# Patient Record
Sex: FEMALE | Race: WHITE | NOT HISPANIC OR LATINO | Employment: OTHER | URBAN - NONMETROPOLITAN AREA
[De-identification: names, ages, dates, MRNs, and addresses within clinical notes are randomized per-mention and may not be internally consistent; named-entity substitution may affect disease eponyms.]

---

## 2017-01-16 ENCOUNTER — HOSPITAL ENCOUNTER (OUTPATIENT)
Dept: LAB | Facility: MEDICAL CENTER | Age: 62
End: 2017-01-16
Attending: INTERNAL MEDICINE
Payer: COMMERCIAL

## 2017-01-16 LAB
ALBUMIN SERPL BCP-MCNC: 4.7 G/DL (ref 3.2–4.9)
ALBUMIN/GLOB SERPL: 2.2 G/DL
ALP SERPL-CCNC: 51 U/L (ref 30–99)
ALT SERPL-CCNC: 22 U/L (ref 2–50)
ANION GAP SERPL CALC-SCNC: 8 MMOL/L (ref 0–11.9)
AST SERPL-CCNC: 32 U/L (ref 12–45)
BASOPHILS # BLD AUTO: 0.01 K/UL (ref 0–0.12)
BASOPHILS NFR BLD AUTO: 0.3 % (ref 0–1.8)
BILIRUB SERPL-MCNC: 1 MG/DL (ref 0.1–1.5)
BUN SERPL-MCNC: 15 MG/DL (ref 8–22)
CALCIUM SERPL-MCNC: 9.5 MG/DL (ref 8.5–10.5)
CHLORIDE SERPL-SCNC: 106 MMOL/L (ref 96–112)
CO2 SERPL-SCNC: 25 MMOL/L (ref 20–33)
CREAT SERPL-MCNC: 0.74 MG/DL (ref 0.5–1.4)
EOSINOPHIL # BLD: 0.04 K/UL (ref 0–0.51)
EOSINOPHIL NFR BLD AUTO: 1.3 % (ref 0–6.9)
ERYTHROCYTE [DISTWIDTH] IN BLOOD BY AUTOMATED COUNT: 60.4 FL (ref 35.9–50)
GLOBULIN SER CALC-MCNC: 2.1 G/DL (ref 1.9–3.5)
GLUCOSE SERPL-MCNC: 98 MG/DL (ref 65–99)
HCT VFR BLD AUTO: 36.1 % (ref 37–47)
HGB BLD-MCNC: 12.1 G/DL (ref 12–16)
IMM GRANULOCYTES # BLD AUTO: 0.01 K/UL (ref 0–0.11)
IMM GRANULOCYTES NFR BLD AUTO: 0.3 % (ref 0–0.9)
LYMPHOCYTES # BLD: 0.38 K/UL (ref 1–4.8)
LYMPHOCYTES NFR BLD AUTO: 12.6 % (ref 22–41)
MAGNESIUM SERPL-MCNC: 2 MG/DL (ref 1.5–2.5)
MCH RBC QN AUTO: 37.2 PG (ref 27–33)
MCHC RBC AUTO-ENTMCNC: 33.5 G/DL (ref 33.6–35)
MCV RBC AUTO: 111.1 FL (ref 81.4–97.8)
MONOCYTES # BLD: 0.31 K/UL (ref 0–0.85)
MONOCYTES NFR BLD AUTO: 10.3 % (ref 0–13.4)
NEUTROPHILS # BLD: 2.27 K/UL (ref 2–7.15)
NEUTROPHILS NFR BLD AUTO: 75.2 % (ref 44–72)
NRBC # BLD AUTO: 0 K/UL
NRBC BLD-RTO: 0 /100 WBC
PHOSPHATE SERPL-MCNC: 2.3 MG/DL (ref 2.5–4.5)
PLATELET # BLD AUTO: 136 K/UL (ref 164–446)
PMV BLD AUTO: 10.1 FL (ref 9–12.9)
POTASSIUM SERPL-SCNC: 3.9 MMOL/L (ref 3.6–5.5)
PROT SERPL-MCNC: 6.8 G/DL (ref 6–8.2)
RBC # BLD AUTO: 3.25 M/UL (ref 4.2–5.4)
SODIUM SERPL-SCNC: 139 MMOL/L (ref 135–145)
WBC # BLD AUTO: 3 K/UL (ref 4.8–10.8)

## 2017-01-16 PROCEDURE — 85025 COMPLETE CBC W/AUTO DIFF WBC: CPT

## 2017-01-16 PROCEDURE — 36415 COLL VENOUS BLD VENIPUNCTURE: CPT

## 2017-01-16 PROCEDURE — 84100 ASSAY OF PHOSPHORUS: CPT

## 2017-01-16 PROCEDURE — 83735 ASSAY OF MAGNESIUM: CPT

## 2017-01-16 PROCEDURE — 86300 IMMUNOASSAY TUMOR CA 15-3: CPT

## 2017-01-16 PROCEDURE — 80053 COMPREHEN METABOLIC PANEL: CPT

## 2017-01-18 LAB — CANCER AG27-29 SERPL-ACNC: 75.1 U/ML (ref 0–40)

## 2017-01-31 ENCOUNTER — HOSPITAL ENCOUNTER (OUTPATIENT)
Dept: LAB | Facility: MEDICAL CENTER | Age: 62
End: 2017-01-31
Attending: INTERNAL MEDICINE
Payer: COMMERCIAL

## 2017-01-31 ENCOUNTER — APPOINTMENT (OUTPATIENT)
Dept: RADIOLOGY | Facility: IMAGING CENTER | Age: 62
End: 2017-01-31
Attending: SPECIALIST
Payer: COMMERCIAL

## 2017-01-31 ENCOUNTER — NON-PROVIDER VISIT (OUTPATIENT)
Dept: URGENT CARE | Facility: PHYSICIAN GROUP | Age: 62
End: 2017-01-31
Payer: COMMERCIAL

## 2017-01-31 ENCOUNTER — HOSPITAL ENCOUNTER (OUTPATIENT)
Dept: LAB | Facility: MEDICAL CENTER | Age: 62
End: 2017-01-31
Attending: SPECIALIST
Payer: COMMERCIAL

## 2017-01-31 DIAGNOSIS — C54.1 MALIGNANT NEOPLASM OF ENDOMETRIUM (HCC): ICD-10-CM

## 2017-01-31 LAB
ALBUMIN SERPL BCP-MCNC: 4.9 G/DL (ref 3.2–4.9)
ALBUMIN/GLOB SERPL: 2.2 G/DL
ALP SERPL-CCNC: 55 U/L (ref 30–99)
ALT SERPL-CCNC: 21 U/L (ref 2–50)
ANION GAP SERPL CALC-SCNC: 6 MMOL/L (ref 0–11.9)
AST SERPL-CCNC: 29 U/L (ref 12–45)
BASOPHILS # BLD AUTO: 0.02 K/UL (ref 0–0.12)
BASOPHILS # BLD AUTO: 0.02 K/UL (ref 0–0.12)
BASOPHILS NFR BLD AUTO: 1 % (ref 0–1.8)
BASOPHILS NFR BLD AUTO: 1.1 % (ref 0–1.8)
BILIRUB SERPL-MCNC: 0.8 MG/DL (ref 0.1–1.5)
BUN SERPL-MCNC: 18 MG/DL (ref 8–22)
CALCIUM SERPL-MCNC: 9.8 MG/DL (ref 8.5–10.5)
CANCER AG125 SERPL-ACNC: 8.2 U/ML (ref 0–35)
CHLORIDE SERPL-SCNC: 106 MMOL/L (ref 96–112)
CO2 SERPL-SCNC: 26 MMOL/L (ref 20–33)
CREAT SERPL-MCNC: 0.76 MG/DL (ref 0.5–1.4)
EOSINOPHIL # BLD: 0.05 K/UL (ref 0–0.51)
EOSINOPHIL # BLD: 0.06 K/UL (ref 0–0.51)
EOSINOPHIL NFR BLD AUTO: 2.7 % (ref 0–6.9)
EOSINOPHIL NFR BLD AUTO: 3.1 % (ref 0–6.9)
ERYTHROCYTE [DISTWIDTH] IN BLOOD BY AUTOMATED COUNT: 54.5 FL (ref 35.9–50)
ERYTHROCYTE [DISTWIDTH] IN BLOOD BY AUTOMATED COUNT: 55.7 FL (ref 35.9–50)
GLOBULIN SER CALC-MCNC: 2.2 G/DL (ref 1.9–3.5)
GLUCOSE SERPL-MCNC: 145 MG/DL (ref 65–99)
HCT VFR BLD AUTO: 37.4 % (ref 37–47)
HCT VFR BLD AUTO: 37.4 % (ref 37–47)
HGB BLD-MCNC: 12.6 G/DL (ref 12–16)
HGB BLD-MCNC: 12.8 G/DL (ref 12–16)
IMM GRANULOCYTES # BLD AUTO: 0.01 K/UL (ref 0–0.11)
IMM GRANULOCYTES # BLD AUTO: 0.01 K/UL (ref 0–0.11)
IMM GRANULOCYTES NFR BLD AUTO: 0.5 % (ref 0–0.9)
IMM GRANULOCYTES NFR BLD AUTO: 0.5 % (ref 0–0.9)
LYMPHOCYTES # BLD: 0.29 K/UL (ref 1–4.8)
LYMPHOCYTES # BLD: 0.35 K/UL (ref 1–4.8)
LYMPHOCYTES NFR BLD AUTO: 15.9 % (ref 22–41)
LYMPHOCYTES NFR BLD AUTO: 18.3 % (ref 22–41)
MCH RBC QN AUTO: 36.8 PG (ref 27–33)
MCH RBC QN AUTO: 37.6 PG (ref 27–33)
MCHC RBC AUTO-ENTMCNC: 33.7 G/DL (ref 33.6–35)
MCHC RBC AUTO-ENTMCNC: 34.2 G/DL (ref 33.6–35)
MCV RBC AUTO: 109.4 FL (ref 81.4–97.8)
MCV RBC AUTO: 110 FL (ref 81.4–97.8)
MONOCYTES # BLD: 0.16 K/UL (ref 0–0.85)
MONOCYTES # BLD: 0.17 K/UL (ref 0–0.85)
MONOCYTES NFR BLD AUTO: 8.8 % (ref 0–13.4)
MONOCYTES NFR BLD AUTO: 8.9 % (ref 0–13.4)
NEUTROPHILS # BLD: 1.29 K/UL (ref 2–7.15)
NEUTROPHILS # BLD: 1.3 K/UL (ref 2–7.15)
NEUTROPHILS NFR BLD AUTO: 68.2 % (ref 44–72)
NEUTROPHILS NFR BLD AUTO: 71 % (ref 44–72)
NRBC # BLD AUTO: 0 K/UL
NRBC # BLD AUTO: 0.02 K/UL
NRBC BLD-RTO: 0 /100 WBC
NRBC BLD-RTO: 1.1 /100 WBC
PLATELET # BLD AUTO: 218 K/UL (ref 164–446)
PLATELET # BLD AUTO: 218 K/UL (ref 164–446)
PMV BLD AUTO: 9.5 FL (ref 9–12.9)
PMV BLD AUTO: 9.6 FL (ref 9–12.9)
POTASSIUM SERPL-SCNC: 3.9 MMOL/L (ref 3.6–5.5)
PROT SERPL-MCNC: 7.1 G/DL (ref 6–8.2)
RBC # BLD AUTO: 3.4 M/UL (ref 4.2–5.4)
RBC # BLD AUTO: 3.42 M/UL (ref 4.2–5.4)
SODIUM SERPL-SCNC: 138 MMOL/L (ref 135–145)
WBC # BLD AUTO: 1.8 K/UL (ref 4.8–10.8)
WBC # BLD AUTO: 1.9 K/UL (ref 4.8–10.8)

## 2017-01-31 PROCEDURE — 86304 IMMUNOASSAY TUMOR CA 125: CPT

## 2017-01-31 PROCEDURE — 71020 DX-CHEST-2 VIEWS: CPT | Mod: 26 | Performed by: NURSE PRACTITIONER

## 2017-01-31 PROCEDURE — 85025 COMPLETE CBC W/AUTO DIFF WBC: CPT | Mod: 91

## 2017-01-31 PROCEDURE — 80053 COMPREHEN METABOLIC PANEL: CPT

## 2017-01-31 PROCEDURE — 36415 COLL VENOUS BLD VENIPUNCTURE: CPT

## 2017-01-31 PROCEDURE — 85025 COMPLETE CBC W/AUTO DIFF WBC: CPT

## 2017-02-07 ENCOUNTER — HOSPITAL ENCOUNTER (OUTPATIENT)
Dept: LAB | Facility: MEDICAL CENTER | Age: 62
End: 2017-02-07
Attending: INTERNAL MEDICINE
Payer: COMMERCIAL

## 2017-02-07 LAB
BASOPHILS # BLD AUTO: 0.01 K/UL (ref 0–0.12)
BASOPHILS NFR BLD AUTO: 0.5 % (ref 0–1.8)
EOSINOPHIL # BLD: 0.07 K/UL (ref 0–0.51)
EOSINOPHIL NFR BLD AUTO: 3.4 % (ref 0–6.9)
ERYTHROCYTE [DISTWIDTH] IN BLOOD BY AUTOMATED COUNT: 60 FL (ref 35.9–50)
HCT VFR BLD AUTO: 35.8 % (ref 37–47)
HGB BLD-MCNC: 12.3 G/DL (ref 12–16)
IMM GRANULOCYTES # BLD AUTO: 0 K/UL (ref 0–0.11)
IMM GRANULOCYTES NFR BLD AUTO: 0 % (ref 0–0.9)
LYMPHOCYTES # BLD: 0.4 K/UL (ref 1–4.8)
LYMPHOCYTES NFR BLD AUTO: 19.7 % (ref 22–41)
MCH RBC QN AUTO: 37.5 PG (ref 27–33)
MCHC RBC AUTO-ENTMCNC: 34.4 G/DL (ref 33.6–35)
MCV RBC AUTO: 109.1 FL (ref 81.4–97.8)
MONOCYTES # BLD: 0.27 K/UL (ref 0–0.85)
MONOCYTES NFR BLD AUTO: 13.3 % (ref 0–13.4)
NEUTROPHILS # BLD: 1.28 K/UL (ref 2–7.15)
NEUTROPHILS NFR BLD AUTO: 63.1 % (ref 44–72)
NRBC # BLD AUTO: 0 K/UL
NRBC BLD-RTO: 0 /100 WBC
PLATELET # BLD AUTO: 169 K/UL (ref 164–446)
PMV BLD AUTO: 9.4 FL (ref 9–12.9)
RBC # BLD AUTO: 3.28 M/UL (ref 4.2–5.4)
WBC # BLD AUTO: 2 K/UL (ref 4.8–10.8)

## 2017-02-07 PROCEDURE — 85025 COMPLETE CBC W/AUTO DIFF WBC: CPT

## 2017-02-07 PROCEDURE — 36415 COLL VENOUS BLD VENIPUNCTURE: CPT

## 2017-02-14 ENCOUNTER — HOSPITAL ENCOUNTER (OUTPATIENT)
Dept: LAB | Facility: MEDICAL CENTER | Age: 62
End: 2017-02-14
Attending: INTERNAL MEDICINE
Payer: COMMERCIAL

## 2017-02-14 LAB
ALBUMIN SERPL BCP-MCNC: 4.9 G/DL (ref 3.2–4.9)
ALBUMIN/GLOB SERPL: 2 G/DL
ALP SERPL-CCNC: 57 U/L (ref 30–99)
ALT SERPL-CCNC: 24 U/L (ref 2–50)
ANION GAP SERPL CALC-SCNC: 9 MMOL/L (ref 0–11.9)
AST SERPL-CCNC: 31 U/L (ref 12–45)
BASOPHILS # BLD AUTO: 0.01 K/UL (ref 0–0.12)
BASOPHILS NFR BLD AUTO: 0.5 % (ref 0–1.8)
BILIRUB SERPL-MCNC: 1.4 MG/DL (ref 0.1–1.5)
BUN SERPL-MCNC: 19 MG/DL (ref 8–22)
CALCIUM SERPL-MCNC: 10.5 MG/DL (ref 8.5–10.5)
CHLORIDE SERPL-SCNC: 103 MMOL/L (ref 96–112)
CO2 SERPL-SCNC: 27 MMOL/L (ref 20–33)
CREAT SERPL-MCNC: 0.78 MG/DL (ref 0.5–1.4)
EOSINOPHIL # BLD: 0.06 K/UL (ref 0–0.51)
EOSINOPHIL NFR BLD AUTO: 3 % (ref 0–6.9)
ERYTHROCYTE [DISTWIDTH] IN BLOOD BY AUTOMATED COUNT: 54.5 FL (ref 35.9–50)
GLOBULIN SER CALC-MCNC: 2.5 G/DL (ref 1.9–3.5)
GLUCOSE SERPL-MCNC: 82 MG/DL (ref 65–99)
HCT VFR BLD AUTO: 38.5 % (ref 37–47)
HGB BLD-MCNC: 13.1 G/DL (ref 12–16)
IMM GRANULOCYTES # BLD AUTO: 0.01 K/UL (ref 0–0.11)
IMM GRANULOCYTES NFR BLD AUTO: 0.5 % (ref 0–0.9)
LYMPHOCYTES # BLD: 0.34 K/UL (ref 1–4.8)
LYMPHOCYTES NFR BLD AUTO: 17 % (ref 22–41)
MAGNESIUM SERPL-MCNC: 1.9 MG/DL (ref 1.5–2.5)
MCH RBC QN AUTO: 36.4 PG (ref 27–33)
MCHC RBC AUTO-ENTMCNC: 34 G/DL (ref 33.6–35)
MCV RBC AUTO: 106.9 FL (ref 81.4–97.8)
MONOCYTES # BLD: 0.24 K/UL (ref 0–0.85)
MONOCYTES NFR BLD AUTO: 12 % (ref 0–13.4)
NEUTROPHILS # BLD: 1.34 K/UL (ref 2–7.15)
NEUTROPHILS NFR BLD AUTO: 67 % (ref 44–72)
NRBC # BLD AUTO: 0 K/UL
NRBC BLD-RTO: 0 /100 WBC
PHOSPHATE SERPL-MCNC: 2.8 MG/DL (ref 2.5–4.5)
PLATELET # BLD AUTO: 123 K/UL (ref 164–446)
PMV BLD AUTO: 9.9 FL (ref 9–12.9)
POTASSIUM SERPL-SCNC: 3.8 MMOL/L (ref 3.6–5.5)
PROT SERPL-MCNC: 7.4 G/DL (ref 6–8.2)
RBC # BLD AUTO: 3.6 M/UL (ref 4.2–5.4)
SODIUM SERPL-SCNC: 139 MMOL/L (ref 135–145)
WBC # BLD AUTO: 2 K/UL (ref 4.8–10.8)

## 2017-02-14 PROCEDURE — 36415 COLL VENOUS BLD VENIPUNCTURE: CPT

## 2017-02-14 PROCEDURE — 80053 COMPREHEN METABOLIC PANEL: CPT

## 2017-02-14 PROCEDURE — 85025 COMPLETE CBC W/AUTO DIFF WBC: CPT

## 2017-02-14 PROCEDURE — 84100 ASSAY OF PHOSPHORUS: CPT

## 2017-02-14 PROCEDURE — 86300 IMMUNOASSAY TUMOR CA 15-3: CPT

## 2017-02-14 PROCEDURE — 83735 ASSAY OF MAGNESIUM: CPT

## 2017-02-16 LAB — CANCER AG27-29 SERPL-ACNC: 73.3 U/ML (ref 0–40)

## 2017-02-21 ENCOUNTER — HOSPITAL ENCOUNTER (OUTPATIENT)
Dept: LAB | Facility: MEDICAL CENTER | Age: 62
End: 2017-02-21
Attending: INTERNAL MEDICINE
Payer: COMMERCIAL

## 2017-02-21 LAB
BASOPHILS # BLD AUTO: 0.01 K/UL (ref 0–0.12)
BASOPHILS NFR BLD AUTO: 0.5 % (ref 0–1.8)
EOSINOPHIL # BLD: 0.08 K/UL (ref 0–0.51)
EOSINOPHIL NFR BLD AUTO: 4 % (ref 0–6.9)
ERYTHROCYTE [DISTWIDTH] IN BLOOD BY AUTOMATED COUNT: 55.6 FL (ref 35.9–50)
HCT VFR BLD AUTO: 34.6 % (ref 37–47)
HGB BLD-MCNC: 11.9 G/DL (ref 12–16)
IMM GRANULOCYTES # BLD AUTO: 0 K/UL (ref 0–0.11)
IMM GRANULOCYTES NFR BLD AUTO: 0 % (ref 0–0.9)
LYMPHOCYTES # BLD: 0.4 K/UL (ref 1–4.8)
LYMPHOCYTES NFR BLD AUTO: 20 % (ref 22–41)
MCH RBC QN AUTO: 37.2 PG (ref 27–33)
MCHC RBC AUTO-ENTMCNC: 34.4 G/DL (ref 33.6–35)
MCV RBC AUTO: 108.1 FL (ref 81.4–97.8)
MONOCYTES # BLD: 0.3 K/UL (ref 0–0.85)
MONOCYTES NFR BLD AUTO: 15 % (ref 0–13.4)
NEUTROPHILS # BLD: 1.21 K/UL (ref 2–7.15)
NEUTROPHILS NFR BLD AUTO: 60.5 % (ref 44–72)
NRBC # BLD AUTO: 0 K/UL
NRBC BLD-RTO: 0 /100 WBC
PLATELET # BLD AUTO: 157 K/UL (ref 164–446)
PMV BLD AUTO: 9.4 FL (ref 9–12.9)
RBC # BLD AUTO: 3.2 M/UL (ref 4.2–5.4)
WBC # BLD AUTO: 2 K/UL (ref 4.8–10.8)

## 2017-02-21 PROCEDURE — 36415 COLL VENOUS BLD VENIPUNCTURE: CPT

## 2017-02-21 PROCEDURE — 85025 COMPLETE CBC W/AUTO DIFF WBC: CPT

## 2017-02-28 ENCOUNTER — HOSPITAL ENCOUNTER (OUTPATIENT)
Dept: LAB | Facility: MEDICAL CENTER | Age: 62
End: 2017-02-28
Attending: INTERNAL MEDICINE
Payer: COMMERCIAL

## 2017-02-28 LAB
BASOPHILS # BLD AUTO: 0.02 K/UL (ref 0–0.12)
BASOPHILS NFR BLD AUTO: 1.1 % (ref 0–1.8)
EOSINOPHIL # BLD: 0.07 K/UL (ref 0–0.51)
EOSINOPHIL NFR BLD AUTO: 3.7 % (ref 0–6.9)
ERYTHROCYTE [DISTWIDTH] IN BLOOD BY AUTOMATED COUNT: 59.7 FL (ref 35.9–50)
HCT VFR BLD AUTO: 36.4 % (ref 37–47)
HGB BLD-MCNC: 12.4 G/DL (ref 12–16)
IMM GRANULOCYTES # BLD AUTO: 0 K/UL (ref 0–0.11)
IMM GRANULOCYTES NFR BLD AUTO: 0 % (ref 0–0.9)
LYMPHOCYTES # BLD: 0.36 K/UL (ref 1–4.8)
LYMPHOCYTES NFR BLD AUTO: 19.1 % (ref 22–41)
MCH RBC QN AUTO: 36.9 PG (ref 27–33)
MCHC RBC AUTO-ENTMCNC: 34.1 G/DL (ref 33.6–35)
MCV RBC AUTO: 108.3 FL (ref 81.4–97.8)
MONOCYTES # BLD: 0.28 K/UL (ref 0–0.85)
MONOCYTES NFR BLD AUTO: 14.9 % (ref 0–13.4)
NEUTROPHILS # BLD: 1.15 K/UL (ref 2–7.15)
NEUTROPHILS NFR BLD AUTO: 61.2 % (ref 44–72)
NRBC # BLD AUTO: 0 K/UL
NRBC BLD-RTO: 0 /100 WBC
PLATELET # BLD AUTO: 177 K/UL (ref 164–446)
PMV BLD AUTO: 9.4 FL (ref 9–12.9)
RBC # BLD AUTO: 3.36 M/UL (ref 4.2–5.4)
WBC # BLD AUTO: 1.9 K/UL (ref 4.8–10.8)

## 2017-02-28 PROCEDURE — 36415 COLL VENOUS BLD VENIPUNCTURE: CPT

## 2017-02-28 PROCEDURE — 85025 COMPLETE CBC W/AUTO DIFF WBC: CPT

## 2017-03-02 ENCOUNTER — HOSPITAL ENCOUNTER (OUTPATIENT)
Facility: MEDICAL CENTER | Age: 62
End: 2017-03-02
Attending: SPECIALIST
Payer: COMMERCIAL

## 2017-03-02 PROCEDURE — 88175 CYTOPATH C/V AUTO FLUID REDO: CPT

## 2017-03-03 LAB — CYTOLOGY REG CYTOL: NORMAL

## 2017-03-07 ENCOUNTER — HOSPITAL ENCOUNTER (OUTPATIENT)
Dept: LAB | Facility: MEDICAL CENTER | Age: 62
End: 2017-03-07
Attending: INTERNAL MEDICINE
Payer: COMMERCIAL

## 2017-03-07 LAB
BASOPHILS # BLD AUTO: 0.02 K/UL (ref 0–0.12)
BASOPHILS NFR BLD AUTO: 0.7 % (ref 0–1.8)
EOSINOPHIL # BLD: 0.05 K/UL (ref 0–0.51)
EOSINOPHIL NFR BLD AUTO: 1.9 % (ref 0–6.9)
ERYTHROCYTE [DISTWIDTH] IN BLOOD BY AUTOMATED COUNT: 56.4 FL (ref 35.9–50)
HCT VFR BLD AUTO: 33.3 % (ref 37–47)
HGB BLD-MCNC: 11.5 G/DL (ref 12–16)
IMM GRANULOCYTES # BLD AUTO: 0 K/UL (ref 0–0.11)
IMM GRANULOCYTES NFR BLD AUTO: 0 % (ref 0–0.9)
LYMPHOCYTES # BLD: 0.45 K/UL (ref 1–4.8)
LYMPHOCYTES NFR BLD AUTO: 16.7 % (ref 22–41)
MCH RBC QN AUTO: 37.3 PG (ref 27–33)
MCHC RBC AUTO-ENTMCNC: 34.5 G/DL (ref 33.6–35)
MCV RBC AUTO: 108.1 FL (ref 81.4–97.8)
MONOCYTES # BLD: 0.3 K/UL (ref 0–0.85)
MONOCYTES NFR BLD AUTO: 11.1 % (ref 0–13.4)
NEUTROPHILS # BLD: 1.88 K/UL (ref 2–7.15)
NEUTROPHILS NFR BLD AUTO: 69.6 % (ref 44–72)
NRBC # BLD AUTO: 0 K/UL
NRBC BLD-RTO: 0 /100 WBC
PLATELET # BLD AUTO: 137 K/UL (ref 164–446)
PMV BLD AUTO: 9.9 FL (ref 9–12.9)
RBC # BLD AUTO: 3.08 M/UL (ref 4.2–5.4)
WBC # BLD AUTO: 2.7 K/UL (ref 4.8–10.8)

## 2017-03-07 PROCEDURE — 85025 COMPLETE CBC W/AUTO DIFF WBC: CPT

## 2017-03-07 PROCEDURE — 36415 COLL VENOUS BLD VENIPUNCTURE: CPT

## 2017-03-14 ENCOUNTER — HOSPITAL ENCOUNTER (OUTPATIENT)
Dept: LAB | Facility: MEDICAL CENTER | Age: 62
End: 2017-03-14
Attending: INTERNAL MEDICINE
Payer: COMMERCIAL

## 2017-03-14 LAB
ALBUMIN SERPL BCP-MCNC: 4.7 G/DL (ref 3.2–4.9)
ALBUMIN/GLOB SERPL: 2.2 G/DL
ALP SERPL-CCNC: 53 U/L (ref 30–99)
ALT SERPL-CCNC: 21 U/L (ref 2–50)
ANION GAP SERPL CALC-SCNC: 8 MMOL/L (ref 0–11.9)
AST SERPL-CCNC: 28 U/L (ref 12–45)
BASOPHILS # BLD AUTO: 0.02 K/UL (ref 0–0.12)
BASOPHILS NFR BLD AUTO: 1.1 % (ref 0–1.8)
BILIRUB SERPL-MCNC: 1.4 MG/DL (ref 0.1–1.5)
BUN SERPL-MCNC: 15 MG/DL (ref 8–22)
CALCIUM SERPL-MCNC: 10.1 MG/DL (ref 8.5–10.5)
CHLORIDE SERPL-SCNC: 102 MMOL/L (ref 96–112)
CO2 SERPL-SCNC: 28 MMOL/L (ref 20–33)
CREAT SERPL-MCNC: 0.84 MG/DL (ref 0.5–1.4)
EOSINOPHIL # BLD: 0.05 K/UL (ref 0–0.51)
EOSINOPHIL NFR BLD AUTO: 2.8 % (ref 0–6.9)
ERYTHROCYTE [DISTWIDTH] IN BLOOD BY AUTOMATED COUNT: 59.3 FL (ref 35.9–50)
GLOBULIN SER CALC-MCNC: 2.1 G/DL (ref 1.9–3.5)
GLUCOSE SERPL-MCNC: 93 MG/DL (ref 65–99)
HCT VFR BLD AUTO: 34.9 % (ref 37–47)
HGB BLD-MCNC: 12 G/DL (ref 12–16)
IMM GRANULOCYTES # BLD AUTO: 0.01 K/UL (ref 0–0.11)
IMM GRANULOCYTES NFR BLD AUTO: 0.6 % (ref 0–0.9)
LYMPHOCYTES # BLD: 0.44 K/UL (ref 1–4.8)
LYMPHOCYTES NFR BLD AUTO: 24.4 % (ref 22–41)
MAGNESIUM SERPL-MCNC: 1.8 MG/DL (ref 1.5–2.5)
MCH RBC QN AUTO: 37 PG (ref 27–33)
MCHC RBC AUTO-ENTMCNC: 34.4 G/DL (ref 33.6–35)
MCV RBC AUTO: 107.7 FL (ref 81.4–97.8)
MONOCYTES # BLD: 0.31 K/UL (ref 0–0.85)
MONOCYTES NFR BLD AUTO: 17.2 % (ref 0–13.4)
NEUTROPHILS # BLD: 0.97 K/UL (ref 2–7.15)
NEUTROPHILS NFR BLD AUTO: 53.9 % (ref 44–72)
NRBC # BLD AUTO: 0.02 K/UL
NRBC BLD-RTO: 1.1 /100 WBC
PHOSPHATE SERPL-MCNC: 3.9 MG/DL (ref 2.5–4.5)
PLATELET # BLD AUTO: 157 K/UL (ref 164–446)
PMV BLD AUTO: 9.5 FL (ref 9–12.9)
POTASSIUM SERPL-SCNC: 4 MMOL/L (ref 3.6–5.5)
PROT SERPL-MCNC: 6.8 G/DL (ref 6–8.2)
RBC # BLD AUTO: 3.24 M/UL (ref 4.2–5.4)
SODIUM SERPL-SCNC: 138 MMOL/L (ref 135–145)
WBC # BLD AUTO: 1.8 K/UL (ref 4.8–10.8)

## 2017-03-14 PROCEDURE — 36415 COLL VENOUS BLD VENIPUNCTURE: CPT

## 2017-03-14 PROCEDURE — 86300 IMMUNOASSAY TUMOR CA 15-3: CPT

## 2017-03-14 PROCEDURE — 83735 ASSAY OF MAGNESIUM: CPT

## 2017-03-14 PROCEDURE — 80053 COMPREHEN METABOLIC PANEL: CPT

## 2017-03-14 PROCEDURE — 85025 COMPLETE CBC W/AUTO DIFF WBC: CPT

## 2017-03-14 PROCEDURE — 84100 ASSAY OF PHOSPHORUS: CPT

## 2017-03-16 LAB — CANCER AG27-29 SERPL-ACNC: 73.9 U/ML (ref 0–40)

## 2017-03-21 ENCOUNTER — HOSPITAL ENCOUNTER (OUTPATIENT)
Dept: LAB | Facility: MEDICAL CENTER | Age: 62
End: 2017-03-21
Attending: INTERNAL MEDICINE
Payer: COMMERCIAL

## 2017-03-21 LAB
BASOPHILS # BLD AUTO: 0.02 K/UL (ref 0–0.12)
BASOPHILS NFR BLD AUTO: 1 % (ref 0–1.8)
EOSINOPHIL # BLD: 0.07 K/UL (ref 0–0.51)
EOSINOPHIL NFR BLD AUTO: 3.6 % (ref 0–6.9)
ERYTHROCYTE [DISTWIDTH] IN BLOOD BY AUTOMATED COUNT: 63.4 FL (ref 35.9–50)
HCT VFR BLD AUTO: 32.9 % (ref 37–47)
HGB BLD-MCNC: 11.6 G/DL (ref 12–16)
IMM GRANULOCYTES # BLD AUTO: 0 K/UL (ref 0–0.11)
IMM GRANULOCYTES NFR BLD AUTO: 0 % (ref 0–0.9)
LYMPHOCYTES # BLD: 0.46 K/UL (ref 1–4.8)
LYMPHOCYTES NFR BLD AUTO: 23.7 % (ref 22–41)
MCH RBC QN AUTO: 38.2 PG (ref 27–33)
MCHC RBC AUTO-ENTMCNC: 35.3 G/DL (ref 33.6–35)
MCV RBC AUTO: 108.2 FL (ref 81.4–97.8)
MONOCYTES # BLD: 0.33 K/UL (ref 0–0.85)
MONOCYTES NFR BLD AUTO: 17 % (ref 0–13.4)
NEUTROPHILS # BLD: 1.06 K/UL (ref 2–7.15)
NEUTROPHILS NFR BLD AUTO: 54.7 % (ref 44–72)
NRBC # BLD AUTO: 0 K/UL
NRBC BLD-RTO: 0 /100 WBC
PLATELET # BLD AUTO: 193 K/UL (ref 164–446)
PMV BLD AUTO: 9.4 FL (ref 9–12.9)
RBC # BLD AUTO: 3.04 M/UL (ref 4.2–5.4)
WBC # BLD AUTO: 1.9 K/UL (ref 4.8–10.8)

## 2017-03-21 PROCEDURE — 85025 COMPLETE CBC W/AUTO DIFF WBC: CPT

## 2017-03-21 PROCEDURE — 36415 COLL VENOUS BLD VENIPUNCTURE: CPT

## 2017-03-28 ENCOUNTER — HOSPITAL ENCOUNTER (OUTPATIENT)
Dept: LAB | Facility: MEDICAL CENTER | Age: 62
End: 2017-03-28
Attending: INTERNAL MEDICINE
Payer: COMMERCIAL

## 2017-03-28 LAB
BASOPHILS # BLD AUTO: 0.03 K/UL (ref 0–0.12)
BASOPHILS NFR BLD AUTO: 1.5 % (ref 0–1.8)
EOSINOPHIL # BLD: 0.05 K/UL (ref 0–0.51)
EOSINOPHIL NFR BLD AUTO: 2.4 % (ref 0–6.9)
ERYTHROCYTE [DISTWIDTH] IN BLOOD BY AUTOMATED COUNT: 60 FL (ref 35.9–50)
HCT VFR BLD AUTO: 36.1 % (ref 37–47)
HGB BLD-MCNC: 12.6 G/DL (ref 12–16)
IMM GRANULOCYTES # BLD AUTO: 0.01 K/UL (ref 0–0.11)
IMM GRANULOCYTES NFR BLD AUTO: 0.5 % (ref 0–0.9)
LYMPHOCYTES # BLD: 0.39 K/UL (ref 1–4.8)
LYMPHOCYTES NFR BLD AUTO: 18.9 % (ref 22–41)
MCH RBC QN AUTO: 37.8 PG (ref 27–33)
MCHC RBC AUTO-ENTMCNC: 34.9 G/DL (ref 33.6–35)
MCV RBC AUTO: 108.4 FL (ref 81.4–97.8)
MONOCYTES # BLD: 0.23 K/UL (ref 0–0.85)
MONOCYTES NFR BLD AUTO: 11.2 % (ref 0–13.4)
NEUTROPHILS # BLD: 1.35 K/UL (ref 2–7.15)
NEUTROPHILS NFR BLD AUTO: 65.5 % (ref 44–72)
NRBC # BLD AUTO: 0.02 K/UL
NRBC BLD-RTO: 1 /100 WBC
PLATELET # BLD AUTO: 132 K/UL (ref 164–446)
PMV BLD AUTO: 9.5 FL (ref 9–12.9)
RBC # BLD AUTO: 3.33 M/UL (ref 4.2–5.4)
WBC # BLD AUTO: 2.1 K/UL (ref 4.8–10.8)

## 2017-03-28 PROCEDURE — 36415 COLL VENOUS BLD VENIPUNCTURE: CPT

## 2017-03-28 PROCEDURE — 85025 COMPLETE CBC W/AUTO DIFF WBC: CPT

## 2017-04-04 ENCOUNTER — HOSPITAL ENCOUNTER (OUTPATIENT)
Dept: LAB | Facility: MEDICAL CENTER | Age: 62
End: 2017-04-04
Attending: INTERNAL MEDICINE
Payer: COMMERCIAL

## 2017-04-04 LAB
BASOPHILS # BLD AUTO: 0.5 % (ref 0–1.8)
BASOPHILS # BLD: 0.01 K/UL (ref 0–0.12)
EOSINOPHIL # BLD AUTO: 0.07 K/UL (ref 0–0.51)
EOSINOPHIL NFR BLD: 3.6 % (ref 0–6.9)
ERYTHROCYTE [DISTWIDTH] IN BLOOD BY AUTOMATED COUNT: 61 FL (ref 35.9–50)
HCT VFR BLD AUTO: 33.5 % (ref 37–47)
HGB BLD-MCNC: 12.1 G/DL (ref 12–16)
IMM GRANULOCYTES # BLD AUTO: 0 K/UL (ref 0–0.11)
IMM GRANULOCYTES NFR BLD AUTO: 0 % (ref 0–0.9)
LYMPHOCYTES # BLD AUTO: 0.49 K/UL (ref 1–4.8)
LYMPHOCYTES NFR BLD: 24.9 % (ref 22–41)
MCH RBC QN AUTO: 39.5 PG (ref 27–33)
MCHC RBC AUTO-ENTMCNC: 36.1 G/DL (ref 33.6–35)
MCV RBC AUTO: 109.5 FL (ref 81.4–97.8)
MONOCYTES # BLD AUTO: 0.26 K/UL (ref 0–0.85)
MONOCYTES NFR BLD AUTO: 13.2 % (ref 0–13.4)
NEUTROPHILS # BLD AUTO: 1.14 K/UL (ref 2–7.15)
NEUTROPHILS NFR BLD: 57.8 % (ref 44–72)
NRBC # BLD AUTO: 0 K/UL
NRBC BLD AUTO-RTO: 0 /100 WBC
PLATELET # BLD AUTO: 135 K/UL (ref 164–446)
PMV BLD AUTO: 9.8 FL (ref 9–12.9)
RBC # BLD AUTO: 3.06 M/UL (ref 4.2–5.4)
WBC # BLD AUTO: 2 K/UL (ref 4.8–10.8)

## 2017-04-04 PROCEDURE — 85025 COMPLETE CBC W/AUTO DIFF WBC: CPT

## 2017-04-04 PROCEDURE — 36415 COLL VENOUS BLD VENIPUNCTURE: CPT

## 2017-04-18 ENCOUNTER — HOSPITAL ENCOUNTER (OUTPATIENT)
Dept: LAB | Facility: MEDICAL CENTER | Age: 62
End: 2017-04-18
Attending: INTERNAL MEDICINE
Payer: COMMERCIAL

## 2017-04-18 PROCEDURE — 86300 IMMUNOASSAY TUMOR CA 15-3: CPT

## 2017-04-18 PROCEDURE — 36415 COLL VENOUS BLD VENIPUNCTURE: CPT

## 2017-04-18 PROCEDURE — 85025 COMPLETE CBC W/AUTO DIFF WBC: CPT

## 2017-04-19 LAB
BASOPHILS # BLD AUTO: 1.2 % (ref 0–1.8)
BASOPHILS # BLD: 0.03 K/UL (ref 0–0.12)
EOSINOPHIL # BLD AUTO: 0.05 K/UL (ref 0–0.51)
EOSINOPHIL NFR BLD: 2.1 % (ref 0–6.9)
ERYTHROCYTE [DISTWIDTH] IN BLOOD BY AUTOMATED COUNT: 59.6 FL (ref 35.9–50)
HCT VFR BLD AUTO: 36.1 % (ref 37–47)
HGB BLD-MCNC: 12.7 G/DL (ref 12–16)
IMM GRANULOCYTES # BLD AUTO: 0 K/UL (ref 0–0.11)
IMM GRANULOCYTES NFR BLD AUTO: 0 % (ref 0–0.9)
LYMPHOCYTES # BLD AUTO: 0.38 K/UL (ref 1–4.8)
LYMPHOCYTES NFR BLD: 15.7 % (ref 22–41)
MCH RBC QN AUTO: 38.3 PG (ref 27–33)
MCHC RBC AUTO-ENTMCNC: 35.2 G/DL (ref 33.6–35)
MCV RBC AUTO: 108.7 FL (ref 81.4–97.8)
MONOCYTES # BLD AUTO: 0.28 K/UL (ref 0–0.85)
MONOCYTES NFR BLD AUTO: 11.6 % (ref 0–13.4)
NEUTROPHILS # BLD AUTO: 1.68 K/UL (ref 2–7.15)
NEUTROPHILS NFR BLD: 69.4 % (ref 44–72)
NRBC # BLD AUTO: 0 K/UL
NRBC BLD AUTO-RTO: 0 /100 WBC
PLATELET # BLD AUTO: 164 K/UL (ref 164–446)
PMV BLD AUTO: 10.1 FL (ref 9–12.9)
RBC # BLD AUTO: 3.32 M/UL (ref 4.2–5.4)
WBC # BLD AUTO: 2.4 K/UL (ref 4.8–10.8)

## 2017-04-20 LAB — CANCER AG27-29 SERPL-ACNC: 76.1 U/ML (ref 0–40)

## 2017-04-24 ENCOUNTER — HOSPITAL ENCOUNTER (OUTPATIENT)
Facility: MEDICAL CENTER | Age: 62
End: 2017-04-24
Attending: INTERNAL MEDICINE
Payer: COMMERCIAL

## 2017-04-24 LAB
ALBUMIN SERPL BCP-MCNC: 4.7 G/DL (ref 3.2–4.9)
ALBUMIN/GLOB SERPL: 2.8 G/DL
ALP SERPL-CCNC: 48 U/L (ref 30–99)
ALT SERPL-CCNC: 18 U/L (ref 2–50)
ANION GAP SERPL CALC-SCNC: 5 MMOL/L (ref 0–11.9)
AST SERPL-CCNC: 26 U/L (ref 12–45)
BILIRUB SERPL-MCNC: 0.9 MG/DL (ref 0.1–1.5)
BUN SERPL-MCNC: 16 MG/DL (ref 8–22)
CALCIUM SERPL-MCNC: 9.9 MG/DL (ref 8.5–10.5)
CHLORIDE SERPL-SCNC: 106 MMOL/L (ref 96–112)
CO2 SERPL-SCNC: 28 MMOL/L (ref 20–33)
CREAT SERPL-MCNC: 0.78 MG/DL (ref 0.5–1.4)
GFR SERPL CREATININE-BSD FRML MDRD: >60 ML/MIN/1.73 M 2
GLOBULIN SER CALC-MCNC: 1.7 G/DL (ref 1.9–3.5)
GLUCOSE SERPL-MCNC: 111 MG/DL (ref 65–99)
POTASSIUM SERPL-SCNC: 4 MMOL/L (ref 3.6–5.5)
PROT SERPL-MCNC: 6.4 G/DL (ref 6–8.2)
SODIUM SERPL-SCNC: 139 MMOL/L (ref 135–145)

## 2017-04-24 PROCEDURE — 80053 COMPREHEN METABOLIC PANEL: CPT

## 2017-05-02 ENCOUNTER — HOSPITAL ENCOUNTER (OUTPATIENT)
Dept: LAB | Facility: MEDICAL CENTER | Age: 62
End: 2017-05-02
Attending: INTERNAL MEDICINE
Payer: COMMERCIAL

## 2017-05-02 LAB
BASOPHILS # BLD AUTO: 0.5 % (ref 0–1.8)
BASOPHILS # BLD: 0.01 K/UL (ref 0–0.12)
EOSINOPHIL # BLD AUTO: 0.06 K/UL (ref 0–0.51)
EOSINOPHIL NFR BLD: 2.9 % (ref 0–6.9)
ERYTHROCYTE [DISTWIDTH] IN BLOOD BY AUTOMATED COUNT: 64.1 FL (ref 35.9–50)
HCT VFR BLD AUTO: 33.9 % (ref 37–47)
HGB BLD-MCNC: 11.7 G/DL (ref 12–16)
IMM GRANULOCYTES # BLD AUTO: 0 K/UL (ref 0–0.11)
IMM GRANULOCYTES NFR BLD AUTO: 0 % (ref 0–0.9)
LYMPHOCYTES # BLD AUTO: 0.49 K/UL (ref 1–4.8)
LYMPHOCYTES NFR BLD: 23.3 % (ref 22–41)
MCH RBC QN AUTO: 38.4 PG (ref 27–33)
MCHC RBC AUTO-ENTMCNC: 34.5 G/DL (ref 33.6–35)
MCV RBC AUTO: 111.1 FL (ref 81.4–97.8)
MONOCYTES # BLD AUTO: 0.3 K/UL (ref 0–0.85)
MONOCYTES NFR BLD AUTO: 14.3 % (ref 0–13.4)
NEUTROPHILS # BLD AUTO: 1.24 K/UL (ref 2–7.15)
NEUTROPHILS NFR BLD: 59 % (ref 44–72)
NRBC # BLD AUTO: 0 K/UL
NRBC BLD AUTO-RTO: 0 /100 WBC
PLATELET # BLD AUTO: 176 K/UL (ref 164–446)
PMV BLD AUTO: 9.5 FL (ref 9–12.9)
RBC # BLD AUTO: 3.05 M/UL (ref 4.2–5.4)
WBC # BLD AUTO: 2.1 K/UL (ref 4.8–10.8)

## 2017-05-02 PROCEDURE — 36415 COLL VENOUS BLD VENIPUNCTURE: CPT

## 2017-05-02 PROCEDURE — 85025 COMPLETE CBC W/AUTO DIFF WBC: CPT

## 2017-05-09 ENCOUNTER — HOSPITAL ENCOUNTER (OUTPATIENT)
Dept: LAB | Facility: MEDICAL CENTER | Age: 62
End: 2017-05-09
Attending: INTERNAL MEDICINE
Payer: COMMERCIAL

## 2017-05-09 LAB
BASOPHILS # BLD AUTO: 1 % (ref 0–1.8)
BASOPHILS # BLD: 0.03 K/UL (ref 0–0.12)
EOSINOPHIL # BLD AUTO: 0.05 K/UL (ref 0–0.51)
EOSINOPHIL NFR BLD: 1.6 % (ref 0–6.9)
ERYTHROCYTE [DISTWIDTH] IN BLOOD BY AUTOMATED COUNT: 59.2 FL (ref 35.9–50)
HCT VFR BLD AUTO: 35.8 % (ref 37–47)
HGB BLD-MCNC: 12.3 G/DL (ref 12–16)
IMM GRANULOCYTES # BLD AUTO: 0 K/UL (ref 0–0.11)
IMM GRANULOCYTES NFR BLD AUTO: 0 % (ref 0–0.9)
LYMPHOCYTES # BLD AUTO: 0.39 K/UL (ref 1–4.8)
LYMPHOCYTES NFR BLD: 12.7 % (ref 22–41)
MCH RBC QN AUTO: 38 PG (ref 27–33)
MCHC RBC AUTO-ENTMCNC: 34.4 G/DL (ref 33.6–35)
MCV RBC AUTO: 110.5 FL (ref 81.4–97.8)
MONOCYTES # BLD AUTO: 0.29 K/UL (ref 0–0.85)
MONOCYTES NFR BLD AUTO: 9.4 % (ref 0–13.4)
NEUTROPHILS # BLD AUTO: 2.31 K/UL (ref 2–7.15)
NEUTROPHILS NFR BLD: 75.3 % (ref 44–72)
NRBC # BLD AUTO: 0 K/UL
NRBC BLD AUTO-RTO: 0 /100 WBC
PLATELET # BLD AUTO: 157 K/UL (ref 164–446)
PMV BLD AUTO: 9.7 FL (ref 9–12.9)
RBC # BLD AUTO: 3.24 M/UL (ref 4.2–5.4)
WBC # BLD AUTO: 3.1 K/UL (ref 4.8–10.8)

## 2017-05-09 PROCEDURE — 36415 COLL VENOUS BLD VENIPUNCTURE: CPT

## 2017-05-09 PROCEDURE — 85025 COMPLETE CBC W/AUTO DIFF WBC: CPT

## 2017-05-16 ENCOUNTER — HOSPITAL ENCOUNTER (OUTPATIENT)
Dept: LAB | Facility: MEDICAL CENTER | Age: 62
End: 2017-05-16
Attending: INTERNAL MEDICINE
Payer: COMMERCIAL

## 2017-05-16 LAB
BASOPHILS # BLD AUTO: 1 % (ref 0–1.8)
BASOPHILS # BLD: 0.02 K/UL (ref 0–0.12)
EOSINOPHIL # BLD AUTO: 0.04 K/UL (ref 0–0.51)
EOSINOPHIL NFR BLD: 2 % (ref 0–6.9)
ERYTHROCYTE [DISTWIDTH] IN BLOOD BY AUTOMATED COUNT: 59.5 FL (ref 35.9–50)
HCT VFR BLD AUTO: 34.9 % (ref 37–47)
HGB BLD-MCNC: 12 G/DL (ref 12–16)
IMM GRANULOCYTES # BLD AUTO: 0 K/UL (ref 0–0.11)
IMM GRANULOCYTES NFR BLD AUTO: 0 % (ref 0–0.9)
LYMPHOCYTES # BLD AUTO: 0.35 K/UL (ref 1–4.8)
LYMPHOCYTES NFR BLD: 17.3 % (ref 22–41)
MCH RBC QN AUTO: 38 PG (ref 27–33)
MCHC RBC AUTO-ENTMCNC: 34.4 G/DL (ref 33.6–35)
MCV RBC AUTO: 110.4 FL (ref 81.4–97.8)
MONOCYTES # BLD AUTO: 0.25 K/UL (ref 0–0.85)
MONOCYTES NFR BLD AUTO: 12.4 % (ref 0–13.4)
NEUTROPHILS # BLD AUTO: 1.36 K/UL (ref 2–7.15)
NEUTROPHILS NFR BLD: 67.3 % (ref 44–72)
NRBC # BLD AUTO: 0 K/UL
NRBC BLD AUTO-RTO: 0 /100 WBC
PLATELET # BLD AUTO: 161 K/UL (ref 164–446)
PMV BLD AUTO: 9.9 FL (ref 9–12.9)
RBC # BLD AUTO: 3.16 M/UL (ref 4.2–5.4)
WBC # BLD AUTO: 2 K/UL (ref 4.8–10.8)

## 2017-05-16 PROCEDURE — 86300 IMMUNOASSAY TUMOR CA 15-3: CPT

## 2017-05-16 PROCEDURE — 36415 COLL VENOUS BLD VENIPUNCTURE: CPT

## 2017-05-16 PROCEDURE — 85025 COMPLETE CBC W/AUTO DIFF WBC: CPT

## 2017-05-18 LAB — CANCER AG27-29 SERPL-ACNC: 73.8 U/ML (ref 0–40)

## 2017-05-23 ENCOUNTER — HOSPITAL ENCOUNTER (OUTPATIENT)
Dept: LAB | Facility: MEDICAL CENTER | Age: 62
End: 2017-05-23
Attending: INTERNAL MEDICINE
Payer: COMMERCIAL

## 2017-05-23 LAB
ANISOCYTOSIS BLD QL SMEAR: ABNORMAL
BASOPHILS # BLD AUTO: 4.5 % (ref 0–1.8)
BASOPHILS # BLD: 0.11 K/UL (ref 0–0.12)
EOSINOPHIL # BLD AUTO: 0.07 K/UL (ref 0–0.51)
EOSINOPHIL NFR BLD: 2.7 % (ref 0–6.9)
ERYTHROCYTE [DISTWIDTH] IN BLOOD BY AUTOMATED COUNT: 65.9 FL (ref 35.9–50)
HCT VFR BLD AUTO: 33.5 % (ref 37–47)
HGB BLD-MCNC: 11.7 G/DL (ref 12–16)
LYMPHOCYTES # BLD AUTO: 0.36 K/UL (ref 1–4.8)
LYMPHOCYTES NFR BLD: 14.4 % (ref 22–41)
MACROCYTES BLD QL SMEAR: ABNORMAL
MANUAL DIFF BLD: NORMAL
MCH RBC QN AUTO: 39 PG (ref 27–33)
MCHC RBC AUTO-ENTMCNC: 34.9 G/DL (ref 33.6–35)
MCV RBC AUTO: 111.7 FL (ref 81.4–97.8)
MONOCYTES # BLD AUTO: 0.2 K/UL (ref 0–0.85)
MONOCYTES NFR BLD AUTO: 8.1 % (ref 0–13.4)
MORPHOLOGY BLD-IMP: NORMAL
NEUTROPHILS # BLD AUTO: 1.76 K/UL (ref 2–7.15)
NEUTROPHILS NFR BLD: 70.3 % (ref 44–72)
NRBC # BLD AUTO: 0 K/UL
NRBC BLD AUTO-RTO: 0 /100 WBC
PLATELET # BLD AUTO: 186 K/UL (ref 164–446)
PLATELET BLD QL SMEAR: NORMAL
PMV BLD AUTO: 9.3 FL (ref 9–12.9)
RBC # BLD AUTO: 3 M/UL (ref 4.2–5.4)
RBC BLD AUTO: PRESENT
WBC # BLD AUTO: 2.5 K/UL (ref 4.8–10.8)

## 2017-05-23 PROCEDURE — 36415 COLL VENOUS BLD VENIPUNCTURE: CPT

## 2017-05-23 PROCEDURE — 85027 COMPLETE CBC AUTOMATED: CPT

## 2017-05-23 PROCEDURE — 85007 BL SMEAR W/DIFF WBC COUNT: CPT

## 2017-06-06 ENCOUNTER — HOSPITAL ENCOUNTER (OUTPATIENT)
Dept: LAB | Facility: MEDICAL CENTER | Age: 62
End: 2017-06-06
Attending: INTERNAL MEDICINE
Payer: COMMERCIAL

## 2017-06-06 LAB
BASOPHILS # BLD AUTO: 0.7 % (ref 0–1.8)
BASOPHILS # BLD: 0.02 K/UL (ref 0–0.12)
EOSINOPHIL # BLD AUTO: 0.05 K/UL (ref 0–0.51)
EOSINOPHIL NFR BLD: 1.7 % (ref 0–6.9)
ERYTHROCYTE [DISTWIDTH] IN BLOOD BY AUTOMATED COUNT: 61.4 FL (ref 35.9–50)
HCT VFR BLD AUTO: 32.6 % (ref 37–47)
HGB BLD-MCNC: 11.4 G/DL (ref 12–16)
IMM GRANULOCYTES # BLD AUTO: 0 K/UL (ref 0–0.11)
IMM GRANULOCYTES NFR BLD AUTO: 0 % (ref 0–0.9)
LYMPHOCYTES # BLD AUTO: 0.44 K/UL (ref 1–4.8)
LYMPHOCYTES NFR BLD: 14.9 % (ref 22–41)
MCH RBC QN AUTO: 38.9 PG (ref 27–33)
MCHC RBC AUTO-ENTMCNC: 35 G/DL (ref 33.6–35)
MCV RBC AUTO: 111.3 FL (ref 81.4–97.8)
MONOCYTES # BLD AUTO: 0.28 K/UL (ref 0–0.85)
MONOCYTES NFR BLD AUTO: 9.5 % (ref 0–13.4)
NEUTROPHILS # BLD AUTO: 2.17 K/UL (ref 2–7.15)
NEUTROPHILS NFR BLD: 73.2 % (ref 44–72)
NRBC # BLD AUTO: 0 K/UL
NRBC BLD AUTO-RTO: 0 /100 WBC
PLATELET # BLD AUTO: 158 K/UL (ref 164–446)
PMV BLD AUTO: 9.8 FL (ref 9–12.9)
RBC # BLD AUTO: 2.93 M/UL (ref 4.2–5.4)
WBC # BLD AUTO: 3 K/UL (ref 4.8–10.8)

## 2017-06-06 PROCEDURE — 36415 COLL VENOUS BLD VENIPUNCTURE: CPT

## 2017-06-06 PROCEDURE — 85025 COMPLETE CBC W/AUTO DIFF WBC: CPT

## 2017-06-26 ENCOUNTER — HOSPITAL ENCOUNTER (OUTPATIENT)
Dept: LAB | Facility: MEDICAL CENTER | Age: 62
End: 2017-06-26
Attending: INTERNAL MEDICINE
Payer: COMMERCIAL

## 2017-06-26 LAB
BASOPHILS # BLD AUTO: 0.8 % (ref 0–1.8)
BASOPHILS # BLD: 0.02 K/UL (ref 0–0.12)
EOSINOPHIL # BLD AUTO: 0.08 K/UL (ref 0–0.51)
EOSINOPHIL NFR BLD: 3.2 % (ref 0–6.9)
ERYTHROCYTE [DISTWIDTH] IN BLOOD BY AUTOMATED COUNT: 59.6 FL (ref 35.9–50)
HCT VFR BLD AUTO: 32.8 % (ref 37–47)
HGB BLD-MCNC: 11.6 G/DL (ref 12–16)
IMM GRANULOCYTES # BLD AUTO: 0 K/UL (ref 0–0.11)
IMM GRANULOCYTES NFR BLD AUTO: 0 % (ref 0–0.9)
LYMPHOCYTES # BLD AUTO: 0.36 K/UL (ref 1–4.8)
LYMPHOCYTES NFR BLD: 14.5 % (ref 22–41)
MCH RBC QN AUTO: 38.9 PG (ref 27–33)
MCHC RBC AUTO-ENTMCNC: 35.4 G/DL (ref 33.6–35)
MCV RBC AUTO: 110.1 FL (ref 81.4–97.8)
MONOCYTES # BLD AUTO: 0.32 K/UL (ref 0–0.85)
MONOCYTES NFR BLD AUTO: 12.9 % (ref 0–13.4)
NEUTROPHILS # BLD AUTO: 1.7 K/UL (ref 2–7.15)
NEUTROPHILS NFR BLD: 68.6 % (ref 44–72)
NRBC # BLD AUTO: 0 K/UL
NRBC BLD AUTO-RTO: 0 /100 WBC
PLATELET # BLD AUTO: 159 K/UL (ref 164–446)
PMV BLD AUTO: 9.6 FL (ref 9–12.9)
RBC # BLD AUTO: 2.98 M/UL (ref 4.2–5.4)
WBC # BLD AUTO: 2.6 K/UL (ref 4.8–10.8)

## 2017-06-26 PROCEDURE — 85025 COMPLETE CBC W/AUTO DIFF WBC: CPT

## 2017-06-26 PROCEDURE — 36415 COLL VENOUS BLD VENIPUNCTURE: CPT

## 2017-06-26 PROCEDURE — 86300 IMMUNOASSAY TUMOR CA 15-3: CPT

## 2017-06-28 ENCOUNTER — HOSPITAL ENCOUNTER (OUTPATIENT)
Dept: RADIOLOGY | Facility: MEDICAL CENTER | Age: 62
End: 2017-06-28
Attending: INTERNAL MEDICINE
Payer: COMMERCIAL

## 2017-06-28 ENCOUNTER — HOSPITAL ENCOUNTER (OUTPATIENT)
Dept: LAB | Facility: MEDICAL CENTER | Age: 62
End: 2017-06-28
Attending: INTERNAL MEDICINE
Payer: COMMERCIAL

## 2017-06-28 DIAGNOSIS — C50.912 MALIGNANT NEOPLASM OF LEFT FEMALE BREAST, UNSPECIFIED SITE OF BREAST: ICD-10-CM

## 2017-06-28 LAB
CANCER AG27-29 SERPL-ACNC: 80.2 U/ML (ref 0–40)
CREAT SERPL-MCNC: 0.72 MG/DL (ref 0.5–1.4)
GFR SERPL CREATININE-BSD FRML MDRD: >60 ML/MIN/1.73 M 2

## 2017-06-28 PROCEDURE — 71260 CT THORAX DX C+: CPT

## 2017-06-28 PROCEDURE — 82565 ASSAY OF CREATININE: CPT

## 2017-06-28 PROCEDURE — A9503 TC99M MEDRONATE: HCPCS

## 2017-06-28 PROCEDURE — 36415 COLL VENOUS BLD VENIPUNCTURE: CPT

## 2017-06-28 PROCEDURE — 700117 HCHG RX CONTRAST REV CODE 255: Performed by: INTERNAL MEDICINE

## 2017-06-28 RX ADMIN — IOHEXOL 100 ML: 350 INJECTION, SOLUTION INTRAVENOUS at 14:41

## 2017-06-29 ENCOUNTER — HOSPITAL ENCOUNTER (OUTPATIENT)
Facility: MEDICAL CENTER | Age: 62
End: 2017-06-29
Attending: INTERNAL MEDICINE
Payer: COMMERCIAL

## 2017-06-29 LAB
ALBUMIN SERPL BCP-MCNC: 4.5 G/DL (ref 3.2–4.9)
ALBUMIN/GLOB SERPL: 2.5 G/DL
ALP SERPL-CCNC: 54 U/L (ref 30–99)
ALT SERPL-CCNC: 20 U/L (ref 2–50)
ANION GAP SERPL CALC-SCNC: 9 MMOL/L (ref 0–11.9)
AST SERPL-CCNC: 28 U/L (ref 12–45)
BILIRUB SERPL-MCNC: 0.7 MG/DL (ref 0.1–1.5)
BUN SERPL-MCNC: 12 MG/DL (ref 8–22)
CALCIUM SERPL-MCNC: 9.4 MG/DL (ref 8.5–10.5)
CHLORIDE SERPL-SCNC: 107 MMOL/L (ref 96–112)
CO2 SERPL-SCNC: 26 MMOL/L (ref 20–33)
CREAT SERPL-MCNC: 0.85 MG/DL (ref 0.5–1.4)
GFR SERPL CREATININE-BSD FRML MDRD: >60 ML/MIN/1.73 M 2
GLOBULIN SER CALC-MCNC: 1.8 G/DL (ref 1.9–3.5)
GLUCOSE SERPL-MCNC: 129 MG/DL (ref 65–99)
MAGNESIUM SERPL-MCNC: 2.1 MG/DL (ref 1.5–2.5)
PHOSPHATE SERPL-MCNC: 2.8 MG/DL (ref 2.5–4.5)
POTASSIUM SERPL-SCNC: 4.1 MMOL/L (ref 3.6–5.5)
PROT SERPL-MCNC: 6.3 G/DL (ref 6–8.2)
SODIUM SERPL-SCNC: 142 MMOL/L (ref 135–145)

## 2017-06-29 PROCEDURE — 80053 COMPREHEN METABOLIC PANEL: CPT

## 2017-06-29 PROCEDURE — 84100 ASSAY OF PHOSPHORUS: CPT

## 2017-06-29 PROCEDURE — 83735 ASSAY OF MAGNESIUM: CPT

## 2017-07-03 ENCOUNTER — HOSPITAL ENCOUNTER (OUTPATIENT)
Dept: LAB | Facility: MEDICAL CENTER | Age: 62
End: 2017-07-03
Attending: INTERNAL MEDICINE
Payer: COMMERCIAL

## 2017-07-03 LAB
ALBUMIN SERPL BCP-MCNC: 4.8 G/DL (ref 3.2–4.9)
ALBUMIN/GLOB SERPL: 2.2 G/DL
ALP SERPL-CCNC: 54 U/L (ref 30–99)
ALT SERPL-CCNC: 19 U/L (ref 2–50)
ANION GAP SERPL CALC-SCNC: 9 MMOL/L (ref 0–11.9)
AST SERPL-CCNC: 31 U/L (ref 12–45)
BASOPHILS # BLD AUTO: 1 % (ref 0–1.8)
BASOPHILS # BLD: 0.03 K/UL (ref 0–0.12)
BILIRUB SERPL-MCNC: 0.8 MG/DL (ref 0.1–1.5)
BUN SERPL-MCNC: 12 MG/DL (ref 8–22)
CALCIUM SERPL-MCNC: 9.6 MG/DL (ref 8.5–10.5)
CHLORIDE SERPL-SCNC: 106 MMOL/L (ref 96–112)
CO2 SERPL-SCNC: 27 MMOL/L (ref 20–33)
CREAT SERPL-MCNC: 0.84 MG/DL (ref 0.5–1.4)
EOSINOPHIL # BLD AUTO: 0.09 K/UL (ref 0–0.51)
EOSINOPHIL NFR BLD: 3 % (ref 0–6.9)
ERYTHROCYTE [DISTWIDTH] IN BLOOD BY AUTOMATED COUNT: 61.8 FL (ref 35.9–50)
GFR SERPL CREATININE-BSD FRML MDRD: >60 ML/MIN/1.73 M 2
GLOBULIN SER CALC-MCNC: 2.2 G/DL (ref 1.9–3.5)
GLUCOSE SERPL-MCNC: 164 MG/DL (ref 65–99)
HCT VFR BLD AUTO: 36 % (ref 37–47)
HGB BLD-MCNC: 12.5 G/DL (ref 12–16)
IMM GRANULOCYTES # BLD AUTO: 0 K/UL (ref 0–0.11)
IMM GRANULOCYTES NFR BLD AUTO: 0 % (ref 0–0.9)
LYMPHOCYTES # BLD AUTO: 0.49 K/UL (ref 1–4.8)
LYMPHOCYTES NFR BLD: 16.3 % (ref 22–41)
MAGNESIUM SERPL-MCNC: 2 MG/DL (ref 1.5–2.5)
MCH RBC QN AUTO: 38.2 PG (ref 27–33)
MCHC RBC AUTO-ENTMCNC: 34.7 G/DL (ref 33.6–35)
MCV RBC AUTO: 110.1 FL (ref 81.4–97.8)
MONOCYTES # BLD AUTO: 0.28 K/UL (ref 0–0.85)
MONOCYTES NFR BLD AUTO: 9.3 % (ref 0–13.4)
NEUTROPHILS # BLD AUTO: 2.12 K/UL (ref 2–7.15)
NEUTROPHILS NFR BLD: 70.4 % (ref 44–72)
NRBC # BLD AUTO: 0 K/UL
NRBC BLD AUTO-RTO: 0 /100 WBC
PHOSPHATE SERPL-MCNC: 2.8 MG/DL (ref 2.5–4.5)
PLATELET # BLD AUTO: 200 K/UL (ref 164–446)
PMV BLD AUTO: 9.4 FL (ref 9–12.9)
POTASSIUM SERPL-SCNC: 4.3 MMOL/L (ref 3.6–5.5)
PROT SERPL-MCNC: 7 G/DL (ref 6–8.2)
RBC # BLD AUTO: 3.27 M/UL (ref 4.2–5.4)
SODIUM SERPL-SCNC: 142 MMOL/L (ref 135–145)
WBC # BLD AUTO: 3 K/UL (ref 4.8–10.8)

## 2017-07-03 PROCEDURE — 83735 ASSAY OF MAGNESIUM: CPT

## 2017-07-03 PROCEDURE — 80053 COMPREHEN METABOLIC PANEL: CPT

## 2017-07-03 PROCEDURE — 84100 ASSAY OF PHOSPHORUS: CPT

## 2017-07-03 PROCEDURE — 36415 COLL VENOUS BLD VENIPUNCTURE: CPT

## 2017-07-03 PROCEDURE — 85025 COMPLETE CBC W/AUTO DIFF WBC: CPT

## 2017-07-11 ENCOUNTER — HOSPITAL ENCOUNTER (OUTPATIENT)
Dept: LAB | Facility: MEDICAL CENTER | Age: 62
End: 2017-07-11
Attending: INTERNAL MEDICINE
Payer: COMMERCIAL

## 2017-07-11 LAB
BASOPHILS # BLD AUTO: 0.8 % (ref 0–1.8)
BASOPHILS # BLD: 0.02 K/UL (ref 0–0.12)
EOSINOPHIL # BLD AUTO: 0.04 K/UL (ref 0–0.51)
EOSINOPHIL NFR BLD: 1.6 % (ref 0–6.9)
ERYTHROCYTE [DISTWIDTH] IN BLOOD BY AUTOMATED COUNT: 58.9 FL (ref 35.9–50)
HCT VFR BLD AUTO: 33.4 % (ref 37–47)
HGB BLD-MCNC: 11.7 G/DL (ref 12–16)
IMM GRANULOCYTES # BLD AUTO: 0 K/UL (ref 0–0.11)
IMM GRANULOCYTES NFR BLD AUTO: 0 % (ref 0–0.9)
LYMPHOCYTES # BLD AUTO: 0.32 K/UL (ref 1–4.8)
LYMPHOCYTES NFR BLD: 12.6 % (ref 22–41)
MCH RBC QN AUTO: 38.7 PG (ref 27–33)
MCHC RBC AUTO-ENTMCNC: 35 G/DL (ref 33.6–35)
MCV RBC AUTO: 110.6 FL (ref 81.4–97.8)
MONOCYTES # BLD AUTO: 0.28 K/UL (ref 0–0.85)
MONOCYTES NFR BLD AUTO: 11.1 % (ref 0–13.4)
NEUTROPHILS # BLD AUTO: 1.87 K/UL (ref 2–7.15)
NEUTROPHILS NFR BLD: 73.9 % (ref 44–72)
NRBC # BLD AUTO: 0 K/UL
NRBC BLD AUTO-RTO: 0 /100 WBC
PLATELET # BLD AUTO: 135 K/UL (ref 164–446)
PMV BLD AUTO: 9.8 FL (ref 9–12.9)
RBC # BLD AUTO: 3.02 M/UL (ref 4.2–5.4)
WBC # BLD AUTO: 2.6 K/UL (ref 4.8–10.8)

## 2017-07-11 PROCEDURE — 36415 COLL VENOUS BLD VENIPUNCTURE: CPT

## 2017-07-11 PROCEDURE — 85025 COMPLETE CBC W/AUTO DIFF WBC: CPT

## 2017-07-18 ENCOUNTER — HOSPITAL ENCOUNTER (OUTPATIENT)
Dept: LAB | Facility: MEDICAL CENTER | Age: 62
End: 2017-07-18
Attending: INTERNAL MEDICINE
Payer: COMMERCIAL

## 2017-07-18 LAB
BASOPHILS # BLD AUTO: 1.1 % (ref 0–1.8)
BASOPHILS # BLD: 0.02 K/UL (ref 0–0.12)
EOSINOPHIL # BLD AUTO: 0.05 K/UL (ref 0–0.51)
EOSINOPHIL NFR BLD: 2.7 % (ref 0–6.9)
ERYTHROCYTE [DISTWIDTH] IN BLOOD BY AUTOMATED COUNT: 58.5 FL (ref 35.9–50)
HCT VFR BLD AUTO: 33.8 % (ref 37–47)
HGB BLD-MCNC: 11.9 G/DL (ref 12–16)
IMM GRANULOCYTES # BLD AUTO: 0.01 K/UL (ref 0–0.11)
IMM GRANULOCYTES NFR BLD AUTO: 0.5 % (ref 0–0.9)
LYMPHOCYTES # BLD AUTO: 0.42 K/UL (ref 1–4.8)
LYMPHOCYTES NFR BLD: 22.5 % (ref 22–41)
MCH RBC QN AUTO: 38.5 PG (ref 27–33)
MCHC RBC AUTO-ENTMCNC: 35.2 G/DL (ref 33.6–35)
MCV RBC AUTO: 109.4 FL (ref 81.4–97.8)
MONOCYTES # BLD AUTO: 0.27 K/UL (ref 0–0.85)
MONOCYTES NFR BLD AUTO: 14.4 % (ref 0–13.4)
NEUTROPHILS # BLD AUTO: 1.1 K/UL (ref 2–7.15)
NEUTROPHILS NFR BLD: 58.8 % (ref 44–72)
NRBC # BLD AUTO: 0 K/UL
NRBC BLD AUTO-RTO: 0 /100 WBC
PLATELET # BLD AUTO: 166 K/UL (ref 164–446)
PMV BLD AUTO: 9.8 FL (ref 9–12.9)
RBC # BLD AUTO: 3.09 M/UL (ref 4.2–5.4)
WBC # BLD AUTO: 1.9 K/UL (ref 4.8–10.8)

## 2017-07-18 PROCEDURE — 36415 COLL VENOUS BLD VENIPUNCTURE: CPT

## 2017-07-18 PROCEDURE — 85025 COMPLETE CBC W/AUTO DIFF WBC: CPT

## 2017-07-25 ENCOUNTER — HOSPITAL ENCOUNTER (OUTPATIENT)
Dept: LAB | Facility: MEDICAL CENTER | Age: 62
End: 2017-07-25
Attending: INTERNAL MEDICINE
Payer: COMMERCIAL

## 2017-07-25 LAB
BASOPHILS # BLD AUTO: 0.9 % (ref 0–1.8)
BASOPHILS # BLD: 0.02 K/UL (ref 0–0.12)
EOSINOPHIL # BLD AUTO: 0.08 K/UL (ref 0–0.51)
EOSINOPHIL NFR BLD: 3.8 % (ref 0–6.9)
ERYTHROCYTE [DISTWIDTH] IN BLOOD BY AUTOMATED COUNT: 63 FL (ref 35.9–50)
HCT VFR BLD AUTO: 34.3 % (ref 37–47)
HGB BLD-MCNC: 12 G/DL (ref 12–16)
IMM GRANULOCYTES # BLD AUTO: 0.01 K/UL (ref 0–0.11)
IMM GRANULOCYTES NFR BLD AUTO: 0.5 % (ref 0–0.9)
LYMPHOCYTES # BLD AUTO: 0.34 K/UL (ref 1–4.8)
LYMPHOCYTES NFR BLD: 16 % (ref 22–41)
MCH RBC QN AUTO: 38.5 PG (ref 27–33)
MCHC RBC AUTO-ENTMCNC: 35 G/DL (ref 33.6–35)
MCV RBC AUTO: 109.9 FL (ref 81.4–97.8)
MONOCYTES # BLD AUTO: 0.26 K/UL (ref 0–0.85)
MONOCYTES NFR BLD AUTO: 12.2 % (ref 0–13.4)
NEUTROPHILS # BLD AUTO: 1.42 K/UL (ref 2–7.15)
NEUTROPHILS NFR BLD: 66.6 % (ref 44–72)
NRBC # BLD AUTO: 0 K/UL
NRBC BLD AUTO-RTO: 0 /100 WBC
PLATELET # BLD AUTO: 185 K/UL (ref 164–446)
PMV BLD AUTO: 9.4 FL (ref 9–12.9)
RBC # BLD AUTO: 3.12 M/UL (ref 4.2–5.4)
WBC # BLD AUTO: 2.1 K/UL (ref 4.8–10.8)

## 2017-07-25 PROCEDURE — 36415 COLL VENOUS BLD VENIPUNCTURE: CPT

## 2017-07-25 PROCEDURE — 85025 COMPLETE CBC W/AUTO DIFF WBC: CPT

## 2017-08-08 ENCOUNTER — HOSPITAL ENCOUNTER (OUTPATIENT)
Dept: LAB | Facility: MEDICAL CENTER | Age: 62
End: 2017-08-08
Attending: INTERNAL MEDICINE
Payer: COMMERCIAL

## 2017-08-08 LAB
ALBUMIN SERPL BCP-MCNC: 4.5 G/DL (ref 3.2–4.9)
ALBUMIN/GLOB SERPL: 2.1 G/DL
ALP SERPL-CCNC: 45 U/L (ref 30–99)
ALT SERPL-CCNC: 21 U/L (ref 2–50)
ANION GAP SERPL CALC-SCNC: 9 MMOL/L (ref 0–11.9)
AST SERPL-CCNC: 32 U/L (ref 12–45)
BASOPHILS # BLD AUTO: 1.5 % (ref 0–1.8)
BASOPHILS # BLD: 0.03 K/UL (ref 0–0.12)
BILIRUB SERPL-MCNC: 1.4 MG/DL (ref 0.1–1.5)
BUN SERPL-MCNC: 13 MG/DL (ref 8–22)
CALCIUM SERPL-MCNC: 10 MG/DL (ref 8.5–10.5)
CHLORIDE SERPL-SCNC: 107 MMOL/L (ref 96–112)
CO2 SERPL-SCNC: 25 MMOL/L (ref 20–33)
CREAT SERPL-MCNC: 0.72 MG/DL (ref 0.5–1.4)
EOSINOPHIL # BLD AUTO: 0.07 K/UL (ref 0–0.51)
EOSINOPHIL NFR BLD: 3.5 % (ref 0–6.9)
ERYTHROCYTE [DISTWIDTH] IN BLOOD BY AUTOMATED COUNT: 58.1 FL (ref 35.9–50)
GFR SERPL CREATININE-BSD FRML MDRD: >60 ML/MIN/1.73 M 2
GLOBULIN SER CALC-MCNC: 2.1 G/DL (ref 1.9–3.5)
GLUCOSE SERPL-MCNC: 94 MG/DL (ref 65–99)
HCT VFR BLD AUTO: 32.1 % (ref 37–47)
HGB BLD-MCNC: 11.3 G/DL (ref 12–16)
IMM GRANULOCYTES # BLD AUTO: 0 K/UL (ref 0–0.11)
IMM GRANULOCYTES NFR BLD AUTO: 0 % (ref 0–0.9)
LYMPHOCYTES # BLD AUTO: 0.39 K/UL (ref 1–4.8)
LYMPHOCYTES NFR BLD: 19.7 % (ref 22–41)
MAGNESIUM SERPL-MCNC: 2 MG/DL (ref 1.5–2.5)
MCH RBC QN AUTO: 38.6 PG (ref 27–33)
MCHC RBC AUTO-ENTMCNC: 35.2 G/DL (ref 33.6–35)
MCV RBC AUTO: 109.6 FL (ref 81.4–97.8)
MONOCYTES # BLD AUTO: 0.27 K/UL (ref 0–0.85)
MONOCYTES NFR BLD AUTO: 13.6 % (ref 0–13.4)
NEUTROPHILS # BLD AUTO: 1.22 K/UL (ref 2–7.15)
NEUTROPHILS NFR BLD: 61.7 % (ref 44–72)
NRBC # BLD AUTO: 0 K/UL
NRBC BLD AUTO-RTO: 0 /100 WBC
PHOSPHATE SERPL-MCNC: 3.1 MG/DL (ref 2.5–4.5)
PLATELET # BLD AUTO: 149 K/UL (ref 164–446)
PMV BLD AUTO: 9.4 FL (ref 9–12.9)
POTASSIUM SERPL-SCNC: 4 MMOL/L (ref 3.6–5.5)
PROT SERPL-MCNC: 6.6 G/DL (ref 6–8.2)
RBC # BLD AUTO: 2.93 M/UL (ref 4.2–5.4)
SODIUM SERPL-SCNC: 141 MMOL/L (ref 135–145)
WBC # BLD AUTO: 2 K/UL (ref 4.8–10.8)

## 2017-08-08 PROCEDURE — 80053 COMPREHEN METABOLIC PANEL: CPT

## 2017-08-08 PROCEDURE — 36415 COLL VENOUS BLD VENIPUNCTURE: CPT

## 2017-08-08 PROCEDURE — 86300 IMMUNOASSAY TUMOR CA 15-3: CPT

## 2017-08-08 PROCEDURE — 84100 ASSAY OF PHOSPHORUS: CPT

## 2017-08-08 PROCEDURE — 83735 ASSAY OF MAGNESIUM: CPT

## 2017-08-08 PROCEDURE — 85025 COMPLETE CBC W/AUTO DIFF WBC: CPT

## 2017-08-10 LAB — CANCER AG27-29 SERPL-ACNC: 81.4 U/ML (ref 0–40)

## 2017-08-15 ENCOUNTER — HOSPITAL ENCOUNTER (OUTPATIENT)
Dept: LAB | Facility: MEDICAL CENTER | Age: 62
End: 2017-08-15
Attending: INTERNAL MEDICINE
Payer: COMMERCIAL

## 2017-08-15 LAB
BASOPHILS # BLD AUTO: 1 % (ref 0–1.8)
BASOPHILS # BLD: 0.02 K/UL (ref 0–0.12)
EOSINOPHIL # BLD AUTO: 0.05 K/UL (ref 0–0.51)
EOSINOPHIL NFR BLD: 2.4 % (ref 0–6.9)
ERYTHROCYTE [DISTWIDTH] IN BLOOD BY AUTOMATED COUNT: 64.2 FL (ref 35.9–50)
HCT VFR BLD AUTO: 31.4 % (ref 37–47)
HGB BLD-MCNC: 11.1 G/DL (ref 12–16)
IMM GRANULOCYTES # BLD AUTO: 0 K/UL (ref 0–0.11)
IMM GRANULOCYTES NFR BLD AUTO: 0 % (ref 0–0.9)
LYMPHOCYTES # BLD AUTO: 0.32 K/UL (ref 1–4.8)
LYMPHOCYTES NFR BLD: 15.6 % (ref 22–41)
MCH RBC QN AUTO: 38.7 PG (ref 27–33)
MCHC RBC AUTO-ENTMCNC: 35.4 G/DL (ref 33.6–35)
MCV RBC AUTO: 109.4 FL (ref 81.4–97.8)
MONOCYTES # BLD AUTO: 0.34 K/UL (ref 0–0.85)
MONOCYTES NFR BLD AUTO: 16.6 % (ref 0–13.4)
NEUTROPHILS # BLD AUTO: 1.32 K/UL (ref 2–7.15)
NEUTROPHILS NFR BLD: 64.4 % (ref 44–72)
NRBC # BLD AUTO: 0 K/UL
NRBC BLD AUTO-RTO: 0 /100 WBC
PLATELET # BLD AUTO: 167 K/UL (ref 164–446)
PMV BLD AUTO: 9.1 FL (ref 9–12.9)
RBC # BLD AUTO: 2.87 M/UL (ref 4.2–5.4)
WBC # BLD AUTO: 2.1 K/UL (ref 4.8–10.8)

## 2017-08-15 PROCEDURE — 85025 COMPLETE CBC W/AUTO DIFF WBC: CPT

## 2017-08-15 PROCEDURE — 36415 COLL VENOUS BLD VENIPUNCTURE: CPT

## 2017-08-22 ENCOUNTER — HOSPITAL ENCOUNTER (OUTPATIENT)
Dept: LAB | Facility: MEDICAL CENTER | Age: 62
End: 2017-08-22
Attending: INTERNAL MEDICINE
Payer: COMMERCIAL

## 2017-08-22 LAB
BASOPHILS # BLD AUTO: 0.9 % (ref 0–1.8)
BASOPHILS # BLD: 0.02 K/UL (ref 0–0.12)
EOSINOPHIL # BLD AUTO: 0.06 K/UL (ref 0–0.51)
EOSINOPHIL NFR BLD: 2.6 % (ref 0–6.9)
ERYTHROCYTE [DISTWIDTH] IN BLOOD BY AUTOMATED COUNT: 57.5 FL (ref 35.9–50)
HCT VFR BLD AUTO: 32.7 % (ref 37–47)
HGB BLD-MCNC: 11.7 G/DL (ref 12–16)
IMM GRANULOCYTES # BLD AUTO: 0 K/UL (ref 0–0.11)
IMM GRANULOCYTES NFR BLD AUTO: 0 % (ref 0–0.9)
LYMPHOCYTES # BLD AUTO: 0.42 K/UL (ref 1–4.8)
LYMPHOCYTES NFR BLD: 18.3 % (ref 22–41)
MCH RBC QN AUTO: 39.3 PG (ref 27–33)
MCHC RBC AUTO-ENTMCNC: 35.8 G/DL (ref 33.6–35)
MCV RBC AUTO: 109.7 FL (ref 81.4–97.8)
MONOCYTES # BLD AUTO: 0.3 K/UL (ref 0–0.85)
MONOCYTES NFR BLD AUTO: 13.1 % (ref 0–13.4)
NEUTROPHILS # BLD AUTO: 1.49 K/UL (ref 2–7.15)
NEUTROPHILS NFR BLD: 65.1 % (ref 44–72)
NRBC # BLD AUTO: 0 K/UL
NRBC BLD AUTO-RTO: 0 /100 WBC
PLATELET # BLD AUTO: 154 K/UL (ref 164–446)
PMV BLD AUTO: 9.4 FL (ref 9–12.9)
RBC # BLD AUTO: 2.98 M/UL (ref 4.2–5.4)
WBC # BLD AUTO: 2.3 K/UL (ref 4.8–10.8)

## 2017-08-22 PROCEDURE — 36415 COLL VENOUS BLD VENIPUNCTURE: CPT

## 2017-08-22 PROCEDURE — 85025 COMPLETE CBC W/AUTO DIFF WBC: CPT

## 2017-08-29 ENCOUNTER — HOSPITAL ENCOUNTER (OUTPATIENT)
Dept: LAB | Facility: MEDICAL CENTER | Age: 62
End: 2017-08-29
Attending: INTERNAL MEDICINE
Payer: COMMERCIAL

## 2017-08-29 LAB
ALBUMIN SERPL BCP-MCNC: 4.5 G/DL (ref 3.2–4.9)
ALBUMIN/GLOB SERPL: 2 G/DL
ALP SERPL-CCNC: 43 U/L (ref 30–99)
ALT SERPL-CCNC: 21 U/L (ref 2–50)
ANION GAP SERPL CALC-SCNC: 8 MMOL/L (ref 0–11.9)
AST SERPL-CCNC: 31 U/L (ref 12–45)
BASOPHILS # BLD AUTO: 2 % (ref 0–1.8)
BASOPHILS # BLD: 0.03 K/UL (ref 0–0.12)
BILIRUB SERPL-MCNC: 1.3 MG/DL (ref 0.1–1.5)
BUN SERPL-MCNC: 14 MG/DL (ref 8–22)
CALCIUM SERPL-MCNC: 9.9 MG/DL (ref 8.5–10.5)
CHLORIDE SERPL-SCNC: 107 MMOL/L (ref 96–112)
CO2 SERPL-SCNC: 25 MMOL/L (ref 20–33)
CREAT SERPL-MCNC: 0.71 MG/DL (ref 0.5–1.4)
EOSINOPHIL # BLD AUTO: 0.04 K/UL (ref 0–0.51)
EOSINOPHIL NFR BLD: 2.6 % (ref 0–6.9)
ERYTHROCYTE [DISTWIDTH] IN BLOOD BY AUTOMATED COUNT: 58.8 FL (ref 35.9–50)
FASTING STATUS PATIENT QL REPORTED: NORMAL
GFR SERPL CREATININE-BSD FRML MDRD: >60 ML/MIN/1.73 M 2
GLOBULIN SER CALC-MCNC: 2.2 G/DL (ref 1.9–3.5)
GLUCOSE SERPL-MCNC: 98 MG/DL (ref 65–99)
HCT VFR BLD AUTO: 32.7 % (ref 37–47)
HGB BLD-MCNC: 11.5 G/DL (ref 12–16)
IMM GRANULOCYTES # BLD AUTO: 0 K/UL (ref 0–0.11)
IMM GRANULOCYTES NFR BLD AUTO: 0 % (ref 0–0.9)
LYMPHOCYTES # BLD AUTO: 0.34 K/UL (ref 1–4.8)
LYMPHOCYTES NFR BLD: 22.4 % (ref 22–41)
MAGNESIUM SERPL-MCNC: 1.8 MG/DL (ref 1.5–2.5)
MCH RBC QN AUTO: 38.1 PG (ref 27–33)
MCHC RBC AUTO-ENTMCNC: 35.2 G/DL (ref 33.6–35)
MCV RBC AUTO: 108.3 FL (ref 81.4–97.8)
MONOCYTES # BLD AUTO: 0.22 K/UL (ref 0–0.85)
MONOCYTES NFR BLD AUTO: 14.5 % (ref 0–13.4)
NEUTROPHILS # BLD AUTO: 0.89 K/UL (ref 2–7.15)
NEUTROPHILS NFR BLD: 58.5 % (ref 44–72)
NRBC # BLD AUTO: 0 K/UL
NRBC BLD AUTO-RTO: 0 /100 WBC
PHOSPHATE SERPL-MCNC: 3.1 MG/DL (ref 2.5–4.5)
PLATELET # BLD AUTO: 176 K/UL (ref 164–446)
PMV BLD AUTO: 9.6 FL (ref 9–12.9)
POTASSIUM SERPL-SCNC: 3.9 MMOL/L (ref 3.6–5.5)
PROT SERPL-MCNC: 6.7 G/DL (ref 6–8.2)
RBC # BLD AUTO: 3.02 M/UL (ref 4.2–5.4)
SODIUM SERPL-SCNC: 140 MMOL/L (ref 135–145)
WBC # BLD AUTO: 1.5 K/UL (ref 4.8–10.8)

## 2017-08-29 PROCEDURE — 84100 ASSAY OF PHOSPHORUS: CPT

## 2017-08-29 PROCEDURE — 85025 COMPLETE CBC W/AUTO DIFF WBC: CPT

## 2017-08-29 PROCEDURE — 36415 COLL VENOUS BLD VENIPUNCTURE: CPT

## 2017-08-29 PROCEDURE — 86300 IMMUNOASSAY TUMOR CA 15-3: CPT

## 2017-08-29 PROCEDURE — 80053 COMPREHEN METABOLIC PANEL: CPT

## 2017-08-29 PROCEDURE — 83735 ASSAY OF MAGNESIUM: CPT

## 2017-08-31 LAB — CANCER AG27-29 SERPL-ACNC: 90.4 U/ML (ref 0–40)

## 2017-09-05 ENCOUNTER — HOSPITAL ENCOUNTER (OUTPATIENT)
Dept: LAB | Facility: MEDICAL CENTER | Age: 62
End: 2017-09-05
Attending: INTERNAL MEDICINE
Payer: COMMERCIAL

## 2017-09-05 LAB
ANISOCYTOSIS BLD QL SMEAR: ABNORMAL
BASOPHILS # BLD AUTO: 0.8 % (ref 0–1.8)
BASOPHILS # BLD: 0.02 K/UL (ref 0–0.12)
COMMENT 1642: NORMAL
EOSINOPHIL # BLD AUTO: 0.06 K/UL (ref 0–0.51)
EOSINOPHIL NFR BLD: 2.3 % (ref 0–6.9)
ERYTHROCYTE [DISTWIDTH] IN BLOOD BY AUTOMATED COUNT: 67.5 FL (ref 35.9–50)
HCT VFR BLD AUTO: 34.1 % (ref 37–47)
HGB BLD-MCNC: 11.8 G/DL (ref 12–16)
IMM GRANULOCYTES # BLD AUTO: 0.01 K/UL (ref 0–0.11)
IMM GRANULOCYTES NFR BLD AUTO: 0.4 % (ref 0–0.9)
LYMPHOCYTES # BLD AUTO: 0.37 K/UL (ref 1–4.8)
LYMPHOCYTES NFR BLD: 14.1 % (ref 22–41)
MACROCYTES BLD QL SMEAR: ABNORMAL
MCH RBC QN AUTO: 38.7 PG (ref 27–33)
MCHC RBC AUTO-ENTMCNC: 34.6 G/DL (ref 33.6–35)
MCV RBC AUTO: 111.8 FL (ref 81.4–97.8)
MONOCYTES # BLD AUTO: 0.28 K/UL (ref 0–0.85)
MONOCYTES NFR BLD AUTO: 10.6 % (ref 0–13.4)
MORPHOLOGY BLD-IMP: NORMAL
NEUTROPHILS # BLD AUTO: 1.89 K/UL (ref 2–7.15)
NEUTROPHILS NFR BLD: 71.8 % (ref 44–72)
NRBC # BLD AUTO: 0 K/UL
NRBC BLD AUTO-RTO: 0 /100 WBC
OVALOCYTES BLD QL SMEAR: NORMAL
PLATELET # BLD AUTO: 163 K/UL (ref 164–446)
PLATELET BLD QL SMEAR: NORMAL
PMV BLD AUTO: 9.2 FL (ref 9–12.9)
POIKILOCYTOSIS BLD QL SMEAR: NORMAL
RBC # BLD AUTO: 3.05 M/UL (ref 4.2–5.4)
RBC BLD AUTO: PRESENT
WBC # BLD AUTO: 2.6 K/UL (ref 4.8–10.8)

## 2017-09-05 PROCEDURE — 36415 COLL VENOUS BLD VENIPUNCTURE: CPT

## 2017-09-05 PROCEDURE — 85025 COMPLETE CBC W/AUTO DIFF WBC: CPT

## 2017-09-12 ENCOUNTER — HOSPITAL ENCOUNTER (OUTPATIENT)
Dept: LAB | Facility: MEDICAL CENTER | Age: 62
End: 2017-09-12
Attending: INTERNAL MEDICINE
Payer: COMMERCIAL

## 2017-09-12 LAB
BASOPHILS # BLD AUTO: 0.7 % (ref 0–1.8)
BASOPHILS # BLD: 0.02 K/UL (ref 0–0.12)
EOSINOPHIL # BLD AUTO: 0.06 K/UL (ref 0–0.51)
EOSINOPHIL NFR BLD: 2.1 % (ref 0–6.9)
ERYTHROCYTE [DISTWIDTH] IN BLOOD BY AUTOMATED COUNT: 61.7 FL (ref 35.9–50)
HCT VFR BLD AUTO: 33.9 % (ref 37–47)
HGB BLD-MCNC: 12 G/DL (ref 12–16)
IMM GRANULOCYTES # BLD AUTO: 0 K/UL (ref 0–0.11)
IMM GRANULOCYTES NFR BLD AUTO: 0 % (ref 0–0.9)
LYMPHOCYTES # BLD AUTO: 0.4 K/UL (ref 1–4.8)
LYMPHOCYTES NFR BLD: 14.3 % (ref 22–41)
MCH RBC QN AUTO: 39.7 PG (ref 27–33)
MCHC RBC AUTO-ENTMCNC: 35.4 G/DL (ref 33.6–35)
MCV RBC AUTO: 112.3 FL (ref 81.4–97.8)
MONOCYTES # BLD AUTO: 0.28 K/UL (ref 0–0.85)
MONOCYTES NFR BLD AUTO: 10 % (ref 0–13.4)
NEUTROPHILS # BLD AUTO: 2.04 K/UL (ref 2–7.15)
NEUTROPHILS NFR BLD: 72.9 % (ref 44–72)
NRBC # BLD AUTO: 0 K/UL
NRBC BLD AUTO-RTO: 0 /100 WBC
PLATELET # BLD AUTO: 151 K/UL (ref 164–446)
PMV BLD AUTO: 9.8 FL (ref 9–12.9)
RBC # BLD AUTO: 3.02 M/UL (ref 4.2–5.4)
WBC # BLD AUTO: 2.8 K/UL (ref 4.8–10.8)

## 2017-09-12 PROCEDURE — 36415 COLL VENOUS BLD VENIPUNCTURE: CPT

## 2017-09-12 PROCEDURE — 85025 COMPLETE CBC W/AUTO DIFF WBC: CPT

## 2017-09-26 ENCOUNTER — HOSPITAL ENCOUNTER (OUTPATIENT)
Dept: LAB | Facility: MEDICAL CENTER | Age: 62
End: 2017-09-26
Attending: INTERNAL MEDICINE
Payer: COMMERCIAL

## 2017-09-26 LAB
ALBUMIN SERPL BCP-MCNC: 4.5 G/DL (ref 3.2–4.9)
ALBUMIN/GLOB SERPL: 2.3 G/DL
ALP SERPL-CCNC: 45 U/L (ref 30–99)
ALT SERPL-CCNC: 17 U/L (ref 2–50)
ANION GAP SERPL CALC-SCNC: 8 MMOL/L (ref 0–11.9)
ANISOCYTOSIS BLD QL SMEAR: ABNORMAL
AST SERPL-CCNC: 31 U/L (ref 12–45)
BASOPHILS # BLD AUTO: 0.6 % (ref 0–1.8)
BASOPHILS # BLD: 0.01 K/UL (ref 0–0.12)
BILIRUB SERPL-MCNC: 1 MG/DL (ref 0.1–1.5)
BUN SERPL-MCNC: 13 MG/DL (ref 8–22)
CALCIUM SERPL-MCNC: 9.3 MG/DL (ref 8.5–10.5)
CHLORIDE SERPL-SCNC: 107 MMOL/L (ref 96–112)
CO2 SERPL-SCNC: 26 MMOL/L (ref 20–33)
COMMENT 1642: NORMAL
CREAT SERPL-MCNC: 0.73 MG/DL (ref 0.5–1.4)
EOSINOPHIL # BLD AUTO: 0.06 K/UL (ref 0–0.51)
EOSINOPHIL NFR BLD: 3.6 % (ref 0–6.9)
ERYTHROCYTE [DISTWIDTH] IN BLOOD BY AUTOMATED COUNT: 65.3 FL (ref 35.9–50)
GFR SERPL CREATININE-BSD FRML MDRD: >60 ML/MIN/1.73 M 2
GLOBULIN SER CALC-MCNC: 2 G/DL (ref 1.9–3.5)
GLUCOSE SERPL-MCNC: 125 MG/DL (ref 65–99)
HCT VFR BLD AUTO: 33.8 % (ref 37–47)
HGB BLD-MCNC: 11.7 G/DL (ref 12–16)
IMM GRANULOCYTES # BLD AUTO: 0 K/UL (ref 0–0.11)
IMM GRANULOCYTES NFR BLD AUTO: 0 % (ref 0–0.9)
LYMPHOCYTES # BLD AUTO: 0.29 K/UL (ref 1–4.8)
LYMPHOCYTES NFR BLD: 17.6 % (ref 22–41)
MACROCYTES BLD QL SMEAR: ABNORMAL
MAGNESIUM SERPL-MCNC: 1.8 MG/DL (ref 1.5–2.5)
MCH RBC QN AUTO: 38.6 PG (ref 27–33)
MCHC RBC AUTO-ENTMCNC: 34.6 G/DL (ref 33.6–35)
MCV RBC AUTO: 111.6 FL (ref 81.4–97.8)
MICROCYTES BLD QL SMEAR: ABNORMAL
MONOCYTES # BLD AUTO: 0.25 K/UL (ref 0–0.85)
MONOCYTES NFR BLD AUTO: 15.2 % (ref 0–13.4)
MORPHOLOGY BLD-IMP: NORMAL
NEUTROPHILS # BLD AUTO: 1.04 K/UL (ref 2–7.15)
NEUTROPHILS NFR BLD: 63 % (ref 44–72)
NRBC # BLD AUTO: 0 K/UL
NRBC BLD AUTO-RTO: 0 /100 WBC
PHOSPHATE SERPL-MCNC: 2.5 MG/DL (ref 2.5–4.5)
PLATELET # BLD AUTO: 173 K/UL (ref 164–446)
PLATELET BLD QL SMEAR: NORMAL
PMV BLD AUTO: 9.4 FL (ref 9–12.9)
POTASSIUM SERPL-SCNC: 3.8 MMOL/L (ref 3.6–5.5)
PROT SERPL-MCNC: 6.5 G/DL (ref 6–8.2)
RBC # BLD AUTO: 3.03 M/UL (ref 4.2–5.4)
RBC BLD AUTO: PRESENT
SODIUM SERPL-SCNC: 141 MMOL/L (ref 135–145)
WBC # BLD AUTO: 1.7 K/UL (ref 4.8–10.8)

## 2017-09-26 PROCEDURE — 86300 IMMUNOASSAY TUMOR CA 15-3: CPT

## 2017-09-26 PROCEDURE — 85025 COMPLETE CBC W/AUTO DIFF WBC: CPT

## 2017-09-26 PROCEDURE — 83735 ASSAY OF MAGNESIUM: CPT

## 2017-09-26 PROCEDURE — 84100 ASSAY OF PHOSPHORUS: CPT

## 2017-09-26 PROCEDURE — 36415 COLL VENOUS BLD VENIPUNCTURE: CPT

## 2017-09-26 PROCEDURE — 80053 COMPREHEN METABOLIC PANEL: CPT

## 2017-09-28 LAB — CANCER AG27-29 SERPL-ACNC: 92.9 U/ML (ref 0–40)

## 2017-10-03 ENCOUNTER — HOSPITAL ENCOUNTER (OUTPATIENT)
Dept: LAB | Facility: MEDICAL CENTER | Age: 62
End: 2017-10-03
Attending: INTERNAL MEDICINE
Payer: COMMERCIAL

## 2017-10-03 LAB
BASOPHILS # BLD AUTO: 1 % (ref 0–1.8)
BASOPHILS # BLD: 0.02 K/UL (ref 0–0.12)
EOSINOPHIL # BLD AUTO: 0.06 K/UL (ref 0–0.51)
EOSINOPHIL NFR BLD: 3.1 % (ref 0–6.9)
ERYTHROCYTE [DISTWIDTH] IN BLOOD BY AUTOMATED COUNT: 57.3 FL (ref 35.9–50)
HCT VFR BLD AUTO: 31.8 % (ref 37–47)
HGB BLD-MCNC: 11.5 G/DL (ref 12–16)
IMM GRANULOCYTES # BLD AUTO: 0.01 K/UL (ref 0–0.11)
IMM GRANULOCYTES NFR BLD AUTO: 0.5 % (ref 0–0.9)
LYMPHOCYTES # BLD AUTO: 0.41 K/UL (ref 1–4.8)
LYMPHOCYTES NFR BLD: 21 % (ref 22–41)
MCH RBC QN AUTO: 40.1 PG (ref 27–33)
MCHC RBC AUTO-ENTMCNC: 36.2 G/DL (ref 33.6–35)
MCV RBC AUTO: 110.8 FL (ref 81.4–97.8)
MONOCYTES # BLD AUTO: 0.23 K/UL (ref 0–0.85)
MONOCYTES NFR BLD AUTO: 11.8 % (ref 0–13.4)
NEUTROPHILS # BLD AUTO: 1.22 K/UL (ref 2–7.15)
NEUTROPHILS NFR BLD: 62.6 % (ref 44–72)
NRBC # BLD AUTO: 0 K/UL
NRBC BLD AUTO-RTO: 0 /100 WBC
PLATELET # BLD AUTO: 142 K/UL (ref 164–446)
PMV BLD AUTO: 9.9 FL (ref 9–12.9)
RBC # BLD AUTO: 2.87 M/UL (ref 4.2–5.4)
WBC # BLD AUTO: 2 K/UL (ref 4.8–10.8)

## 2017-10-03 PROCEDURE — 85025 COMPLETE CBC W/AUTO DIFF WBC: CPT

## 2017-10-03 PROCEDURE — 36415 COLL VENOUS BLD VENIPUNCTURE: CPT

## 2017-11-06 ENCOUNTER — HOSPITAL ENCOUNTER (OUTPATIENT)
Dept: LAB | Facility: MEDICAL CENTER | Age: 62
End: 2017-11-06
Attending: INTERNAL MEDICINE
Payer: COMMERCIAL

## 2017-11-06 LAB
ALBUMIN SERPL BCP-MCNC: 4.5 G/DL (ref 3.2–4.9)
ALBUMIN/GLOB SERPL: 2 G/DL
ALP SERPL-CCNC: 49 U/L (ref 30–99)
ALT SERPL-CCNC: 17 U/L (ref 2–50)
ANION GAP SERPL CALC-SCNC: 10 MMOL/L (ref 0–11.9)
AST SERPL-CCNC: 29 U/L (ref 12–45)
BASOPHILS # BLD AUTO: 0.9 % (ref 0–1.8)
BASOPHILS # BLD: 0.03 K/UL (ref 0–0.12)
BILIRUB SERPL-MCNC: 0.9 MG/DL (ref 0.1–1.5)
BUN SERPL-MCNC: 13 MG/DL (ref 8–22)
CALCIUM SERPL-MCNC: 10.4 MG/DL (ref 8.5–10.5)
CHLORIDE SERPL-SCNC: 103 MMOL/L (ref 96–112)
CO2 SERPL-SCNC: 26 MMOL/L (ref 20–33)
CREAT SERPL-MCNC: 0.72 MG/DL (ref 0.5–1.4)
EOSINOPHIL # BLD AUTO: 0.05 K/UL (ref 0–0.51)
EOSINOPHIL NFR BLD: 1.6 % (ref 0–6.9)
ERYTHROCYTE [DISTWIDTH] IN BLOOD BY AUTOMATED COUNT: 62 FL (ref 35.9–50)
GFR SERPL CREATININE-BSD FRML MDRD: >60 ML/MIN/1.73 M 2
GLOBULIN SER CALC-MCNC: 2.2 G/DL (ref 1.9–3.5)
GLUCOSE SERPL-MCNC: 82 MG/DL (ref 65–99)
HCT VFR BLD AUTO: 32.3 % (ref 37–47)
HGB BLD-MCNC: 11.6 G/DL (ref 12–16)
IMM GRANULOCYTES # BLD AUTO: 0.01 K/UL (ref 0–0.11)
IMM GRANULOCYTES NFR BLD AUTO: 0.3 % (ref 0–0.9)
LYMPHOCYTES # BLD AUTO: 0.4 K/UL (ref 1–4.8)
LYMPHOCYTES NFR BLD: 12.5 % (ref 22–41)
MAGNESIUM SERPL-MCNC: 1.7 MG/DL (ref 1.5–2.5)
MCH RBC QN AUTO: 39.6 PG (ref 27–33)
MCHC RBC AUTO-ENTMCNC: 35.9 G/DL (ref 33.6–35)
MCV RBC AUTO: 110.2 FL (ref 81.4–97.8)
MONOCYTES # BLD AUTO: 0.46 K/UL (ref 0–0.85)
MONOCYTES NFR BLD AUTO: 14.4 % (ref 0–13.4)
NEUTROPHILS # BLD AUTO: 2.25 K/UL (ref 2–7.15)
NEUTROPHILS NFR BLD: 70.3 % (ref 44–72)
NRBC # BLD AUTO: 0 K/UL
NRBC BLD AUTO-RTO: 0 /100 WBC
PHOSPHATE SERPL-MCNC: 3 MG/DL (ref 2.5–4.5)
PLATELET # BLD AUTO: 199 K/UL (ref 164–446)
PMV BLD AUTO: 9.3 FL (ref 9–12.9)
POTASSIUM SERPL-SCNC: 3.7 MMOL/L (ref 3.6–5.5)
PROT SERPL-MCNC: 6.7 G/DL (ref 6–8.2)
RBC # BLD AUTO: 2.93 M/UL (ref 4.2–5.4)
SODIUM SERPL-SCNC: 139 MMOL/L (ref 135–145)
WBC # BLD AUTO: 3.2 K/UL (ref 4.8–10.8)

## 2017-11-06 PROCEDURE — 85025 COMPLETE CBC W/AUTO DIFF WBC: CPT

## 2017-11-06 PROCEDURE — 80053 COMPREHEN METABOLIC PANEL: CPT

## 2017-11-06 PROCEDURE — 84100 ASSAY OF PHOSPHORUS: CPT

## 2017-11-06 PROCEDURE — 83735 ASSAY OF MAGNESIUM: CPT

## 2017-11-06 PROCEDURE — 86300 IMMUNOASSAY TUMOR CA 15-3: CPT

## 2017-11-06 PROCEDURE — 36415 COLL VENOUS BLD VENIPUNCTURE: CPT

## 2017-11-08 LAB — CANCER AG27-29 SERPL-ACNC: 108.4 U/ML (ref 0–40)

## 2017-11-14 ENCOUNTER — HOSPITAL ENCOUNTER (OUTPATIENT)
Dept: LAB | Facility: MEDICAL CENTER | Age: 62
End: 2017-11-14
Attending: INTERNAL MEDICINE
Payer: COMMERCIAL

## 2017-11-14 LAB
BASOPHILS # BLD AUTO: 0.5 % (ref 0–1.8)
BASOPHILS # BLD: 0.02 K/UL (ref 0–0.12)
EOSINOPHIL # BLD AUTO: 0.15 K/UL (ref 0–0.51)
EOSINOPHIL NFR BLD: 3.8 % (ref 0–6.9)
ERYTHROCYTE [DISTWIDTH] IN BLOOD BY AUTOMATED COUNT: 58 FL (ref 35.9–50)
HCT VFR BLD AUTO: 34.1 % (ref 37–47)
HGB BLD-MCNC: 11.9 G/DL (ref 12–16)
IMM GRANULOCYTES # BLD AUTO: 0.01 K/UL (ref 0–0.11)
IMM GRANULOCYTES NFR BLD AUTO: 0.3 % (ref 0–0.9)
LYMPHOCYTES # BLD AUTO: 0.28 K/UL (ref 1–4.8)
LYMPHOCYTES NFR BLD: 7.1 % (ref 22–41)
MCH RBC QN AUTO: 38.9 PG (ref 27–33)
MCHC RBC AUTO-ENTMCNC: 34.9 G/DL (ref 33.6–35)
MCV RBC AUTO: 111.4 FL (ref 81.4–97.8)
MONOCYTES # BLD AUTO: 0.5 K/UL (ref 0–0.85)
MONOCYTES NFR BLD AUTO: 12.7 % (ref 0–13.4)
NEUTROPHILS # BLD AUTO: 2.99 K/UL (ref 2–7.15)
NEUTROPHILS NFR BLD: 75.6 % (ref 44–72)
NRBC # BLD AUTO: 0 K/UL
NRBC BLD AUTO-RTO: 0 /100 WBC
PLATELET # BLD AUTO: 156 K/UL (ref 164–446)
PMV BLD AUTO: 9.8 FL (ref 9–12.9)
RBC # BLD AUTO: 3.06 M/UL (ref 4.2–5.4)
WBC # BLD AUTO: 4 K/UL (ref 4.8–10.8)

## 2017-11-14 PROCEDURE — 85025 COMPLETE CBC W/AUTO DIFF WBC: CPT

## 2017-11-14 PROCEDURE — 36415 COLL VENOUS BLD VENIPUNCTURE: CPT

## 2017-11-21 ENCOUNTER — HOSPITAL ENCOUNTER (OUTPATIENT)
Dept: LAB | Facility: MEDICAL CENTER | Age: 62
End: 2017-11-21
Attending: INTERNAL MEDICINE
Payer: COMMERCIAL

## 2017-11-21 LAB
BASOPHILS # BLD AUTO: 1.1 % (ref 0–1.8)
BASOPHILS # BLD: 0.04 K/UL (ref 0–0.12)
EOSINOPHIL # BLD AUTO: 0.09 K/UL (ref 0–0.51)
EOSINOPHIL NFR BLD: 2.6 % (ref 0–6.9)
ERYTHROCYTE [DISTWIDTH] IN BLOOD BY AUTOMATED COUNT: 60.1 FL (ref 35.9–50)
HCT VFR BLD AUTO: 31.3 % (ref 37–47)
HGB BLD-MCNC: 11 G/DL (ref 12–16)
IMM GRANULOCYTES # BLD AUTO: 0 K/UL (ref 0–0.11)
IMM GRANULOCYTES NFR BLD AUTO: 0 % (ref 0–0.9)
LYMPHOCYTES # BLD AUTO: 0.4 K/UL (ref 1–4.8)
LYMPHOCYTES NFR BLD: 11.4 % (ref 22–41)
MCH RBC QN AUTO: 39.1 PG (ref 27–33)
MCHC RBC AUTO-ENTMCNC: 35.1 G/DL (ref 33.6–35)
MCV RBC AUTO: 111.4 FL (ref 81.4–97.8)
MONOCYTES # BLD AUTO: 0.38 K/UL (ref 0–0.85)
MONOCYTES NFR BLD AUTO: 10.8 % (ref 0–13.4)
NEUTROPHILS # BLD AUTO: 2.61 K/UL (ref 2–7.15)
NEUTROPHILS NFR BLD: 74.1 % (ref 44–72)
NRBC # BLD AUTO: 0 K/UL
NRBC BLD AUTO-RTO: 0 /100 WBC
PLATELET # BLD AUTO: 241 K/UL (ref 164–446)
PMV BLD AUTO: 9.2 FL (ref 9–12.9)
RBC # BLD AUTO: 2.81 M/UL (ref 4.2–5.4)
WBC # BLD AUTO: 3.5 K/UL (ref 4.8–10.8)

## 2017-11-21 PROCEDURE — 36415 COLL VENOUS BLD VENIPUNCTURE: CPT

## 2017-11-21 PROCEDURE — 85025 COMPLETE CBC W/AUTO DIFF WBC: CPT

## 2017-11-28 ENCOUNTER — HOSPITAL ENCOUNTER (OUTPATIENT)
Dept: LAB | Facility: MEDICAL CENTER | Age: 62
End: 2017-11-28
Attending: INTERNAL MEDICINE
Payer: COMMERCIAL

## 2017-11-28 LAB
ALBUMIN SERPL BCP-MCNC: 4.5 G/DL (ref 3.2–4.9)
ALBUMIN/GLOB SERPL: 1.5 G/DL
ALP SERPL-CCNC: 72 U/L (ref 30–99)
ALT SERPL-CCNC: 22 U/L (ref 2–50)
ANION GAP SERPL CALC-SCNC: 11 MMOL/L (ref 0–11.9)
AST SERPL-CCNC: 43 U/L (ref 12–45)
BASOPHILS # BLD AUTO: 1.3 % (ref 0–1.8)
BASOPHILS # BLD: 0.05 K/UL (ref 0–0.12)
BILIRUB SERPL-MCNC: 0.7 MG/DL (ref 0.1–1.5)
BUN SERPL-MCNC: 11 MG/DL (ref 8–22)
CALCIUM SERPL-MCNC: 10.8 MG/DL (ref 8.5–10.5)
CHLORIDE SERPL-SCNC: 104 MMOL/L (ref 96–112)
CO2 SERPL-SCNC: 27 MMOL/L (ref 20–33)
CREAT SERPL-MCNC: 0.68 MG/DL (ref 0.5–1.4)
EOSINOPHIL # BLD AUTO: 0.12 K/UL (ref 0–0.51)
EOSINOPHIL NFR BLD: 3 % (ref 0–6.9)
ERYTHROCYTE [DISTWIDTH] IN BLOOD BY AUTOMATED COUNT: 64.3 FL (ref 35.9–50)
GFR SERPL CREATININE-BSD FRML MDRD: >60 ML/MIN/1.73 M 2
GLOBULIN SER CALC-MCNC: 3.1 G/DL (ref 1.9–3.5)
GLUCOSE SERPL-MCNC: 92 MG/DL (ref 65–99)
HCT VFR BLD AUTO: 38.8 % (ref 37–47)
HGB BLD-MCNC: 13.2 G/DL (ref 12–16)
IMM GRANULOCYTES # BLD AUTO: 0.01 K/UL (ref 0–0.11)
IMM GRANULOCYTES NFR BLD AUTO: 0.3 % (ref 0–0.9)
LYMPHOCYTES # BLD AUTO: 0.46 K/UL (ref 1–4.8)
LYMPHOCYTES NFR BLD: 11.6 % (ref 22–41)
MAGNESIUM SERPL-MCNC: 1.6 MG/DL (ref 1.5–2.5)
MCH RBC QN AUTO: 38.3 PG (ref 27–33)
MCHC RBC AUTO-ENTMCNC: 34 G/DL (ref 33.6–35)
MCV RBC AUTO: 112.5 FL (ref 81.4–97.8)
MONOCYTES # BLD AUTO: 0.41 K/UL (ref 0–0.85)
MONOCYTES NFR BLD AUTO: 10.4 % (ref 0–13.4)
NEUTROPHILS # BLD AUTO: 2.9 K/UL (ref 2–7.15)
NEUTROPHILS NFR BLD: 73.4 % (ref 44–72)
NRBC # BLD AUTO: 0 K/UL
NRBC BLD AUTO-RTO: 0 /100 WBC
PHOSPHATE SERPL-MCNC: 3.2 MG/DL (ref 2.5–4.5)
PLATELET # BLD AUTO: 286 K/UL (ref 164–446)
PMV BLD AUTO: 9.1 FL (ref 9–12.9)
POTASSIUM SERPL-SCNC: 3.8 MMOL/L (ref 3.6–5.5)
PROT SERPL-MCNC: 7.6 G/DL (ref 6–8.2)
RBC # BLD AUTO: 3.45 M/UL (ref 4.2–5.4)
SODIUM SERPL-SCNC: 142 MMOL/L (ref 135–145)
WBC # BLD AUTO: 4 K/UL (ref 4.8–10.8)

## 2017-11-28 PROCEDURE — 83735 ASSAY OF MAGNESIUM: CPT

## 2017-11-28 PROCEDURE — 86300 IMMUNOASSAY TUMOR CA 15-3: CPT

## 2017-11-28 PROCEDURE — 80053 COMPREHEN METABOLIC PANEL: CPT

## 2017-11-28 PROCEDURE — 36415 COLL VENOUS BLD VENIPUNCTURE: CPT

## 2017-11-28 PROCEDURE — 85025 COMPLETE CBC W/AUTO DIFF WBC: CPT

## 2017-11-28 PROCEDURE — 84100 ASSAY OF PHOSPHORUS: CPT

## 2017-12-01 LAB — CANCER AG27-29 SERPL-ACNC: 126.6 U/ML (ref 0–40)

## 2017-12-05 ENCOUNTER — HOSPITAL ENCOUNTER (OUTPATIENT)
Dept: LAB | Facility: MEDICAL CENTER | Age: 62
End: 2017-12-05
Attending: INTERNAL MEDICINE
Payer: COMMERCIAL

## 2017-12-05 LAB
BASOPHILS # BLD AUTO: 0.8 % (ref 0–1.8)
BASOPHILS # BLD: 0.03 K/UL (ref 0–0.12)
EOSINOPHIL # BLD AUTO: 0.09 K/UL (ref 0–0.51)
EOSINOPHIL NFR BLD: 2.5 % (ref 0–6.9)
ERYTHROCYTE [DISTWIDTH] IN BLOOD BY AUTOMATED COUNT: 58.8 FL (ref 35.9–50)
HCT VFR BLD AUTO: 34.9 % (ref 37–47)
HGB BLD-MCNC: 12.2 G/DL (ref 12–16)
IMM GRANULOCYTES # BLD AUTO: 0.01 K/UL (ref 0–0.11)
IMM GRANULOCYTES NFR BLD AUTO: 0.3 % (ref 0–0.9)
LYMPHOCYTES # BLD AUTO: 0.44 K/UL (ref 1–4.8)
LYMPHOCYTES NFR BLD: 12.1 % (ref 22–41)
MCH RBC QN AUTO: 38.9 PG (ref 27–33)
MCHC RBC AUTO-ENTMCNC: 35 G/DL (ref 33.6–35)
MCV RBC AUTO: 111.1 FL (ref 81.4–97.8)
MONOCYTES # BLD AUTO: 0.32 K/UL (ref 0–0.85)
MONOCYTES NFR BLD AUTO: 8.8 % (ref 0–13.4)
NEUTROPHILS # BLD AUTO: 2.75 K/UL (ref 2–7.15)
NEUTROPHILS NFR BLD: 75.5 % (ref 44–72)
NRBC # BLD AUTO: 0 K/UL
NRBC BLD AUTO-RTO: 0 /100 WBC
PLATELET # BLD AUTO: 191 K/UL (ref 164–446)
PMV BLD AUTO: 9.5 FL (ref 9–12.9)
RBC # BLD AUTO: 3.14 M/UL (ref 4.2–5.4)
WBC # BLD AUTO: 3.6 K/UL (ref 4.8–10.8)

## 2017-12-05 PROCEDURE — 36415 COLL VENOUS BLD VENIPUNCTURE: CPT

## 2017-12-05 PROCEDURE — 85025 COMPLETE CBC W/AUTO DIFF WBC: CPT

## 2017-12-12 ENCOUNTER — HOSPITAL ENCOUNTER (OUTPATIENT)
Dept: LAB | Facility: MEDICAL CENTER | Age: 62
End: 2017-12-12
Attending: INTERNAL MEDICINE
Payer: COMMERCIAL

## 2017-12-12 LAB
BASOPHILS # BLD AUTO: 1.8 % (ref 0–1.8)
BASOPHILS # BLD: 0.05 K/UL (ref 0–0.12)
EOSINOPHIL # BLD AUTO: 0.12 K/UL (ref 0–0.51)
EOSINOPHIL NFR BLD: 4.3 % (ref 0–6.9)
ERYTHROCYTE [DISTWIDTH] IN BLOOD BY AUTOMATED COUNT: 61.1 FL (ref 35.9–50)
HCT VFR BLD AUTO: 34.2 % (ref 37–47)
HGB BLD-MCNC: 11.8 G/DL (ref 12–16)
IMM GRANULOCYTES # BLD AUTO: 0.01 K/UL (ref 0–0.11)
IMM GRANULOCYTES NFR BLD AUTO: 0.4 % (ref 0–0.9)
LYMPHOCYTES # BLD AUTO: 0.66 K/UL (ref 1–4.8)
LYMPHOCYTES NFR BLD: 23.8 % (ref 22–41)
MCH RBC QN AUTO: 38.4 PG (ref 27–33)
MCHC RBC AUTO-ENTMCNC: 34.5 G/DL (ref 33.6–35)
MCV RBC AUTO: 111.4 FL (ref 81.4–97.8)
MONOCYTES # BLD AUTO: 0.4 K/UL (ref 0–0.85)
MONOCYTES NFR BLD AUTO: 14.4 % (ref 0–13.4)
NEUTROPHILS # BLD AUTO: 1.53 K/UL (ref 2–7.15)
NEUTROPHILS NFR BLD: 55.3 % (ref 44–72)
NRBC # BLD AUTO: 0 K/UL
NRBC BLD AUTO-RTO: 0 /100 WBC
PLATELET # BLD AUTO: 217 K/UL (ref 164–446)
PMV BLD AUTO: 9.7 FL (ref 9–12.9)
RBC # BLD AUTO: 3.07 M/UL (ref 4.2–5.4)
WBC # BLD AUTO: 2.8 K/UL (ref 4.8–10.8)

## 2017-12-12 PROCEDURE — 85025 COMPLETE CBC W/AUTO DIFF WBC: CPT

## 2017-12-12 PROCEDURE — 36415 COLL VENOUS BLD VENIPUNCTURE: CPT

## 2017-12-18 ENCOUNTER — HOSPITAL ENCOUNTER (OUTPATIENT)
Dept: LAB | Facility: MEDICAL CENTER | Age: 62
End: 2017-12-18
Attending: INTERNAL MEDICINE
Payer: COMMERCIAL

## 2017-12-18 LAB
BASOPHILS # BLD AUTO: 1 % (ref 0–1.8)
BASOPHILS # BLD: 0.03 K/UL (ref 0–0.12)
EOSINOPHIL # BLD AUTO: 0.09 K/UL (ref 0–0.51)
EOSINOPHIL NFR BLD: 3 % (ref 0–6.9)
ERYTHROCYTE [DISTWIDTH] IN BLOOD BY AUTOMATED COUNT: 63.8 FL (ref 35.9–50)
HCT VFR BLD AUTO: 34.9 % (ref 37–47)
HGB BLD-MCNC: 12.2 G/DL (ref 12–16)
IMM GRANULOCYTES # BLD AUTO: 0.01 K/UL (ref 0–0.11)
IMM GRANULOCYTES NFR BLD AUTO: 0.3 % (ref 0–0.9)
LYMPHOCYTES # BLD AUTO: 0.47 K/UL (ref 1–4.8)
LYMPHOCYTES NFR BLD: 15.7 % (ref 22–41)
MCH RBC QN AUTO: 38.7 PG (ref 27–33)
MCHC RBC AUTO-ENTMCNC: 35 G/DL (ref 33.6–35)
MCV RBC AUTO: 110.8 FL (ref 81.4–97.8)
MONOCYTES # BLD AUTO: 0.42 K/UL (ref 0–0.85)
MONOCYTES NFR BLD AUTO: 14 % (ref 0–13.4)
NEUTROPHILS # BLD AUTO: 1.97 K/UL (ref 2–7.15)
NEUTROPHILS NFR BLD: 66 % (ref 44–72)
NRBC # BLD AUTO: 0 K/UL
NRBC BLD-RTO: 0 /100 WBC
PLATELET # BLD AUTO: 212 K/UL (ref 164–446)
PMV BLD AUTO: 9.4 FL (ref 9–12.9)
RBC # BLD AUTO: 3.15 M/UL (ref 4.2–5.4)
WBC # BLD AUTO: 3 K/UL (ref 4.8–10.8)

## 2017-12-18 PROCEDURE — 36415 COLL VENOUS BLD VENIPUNCTURE: CPT

## 2017-12-18 PROCEDURE — 85025 COMPLETE CBC W/AUTO DIFF WBC: CPT

## 2017-12-19 ENCOUNTER — HOSPITAL ENCOUNTER (OUTPATIENT)
Dept: RADIOLOGY | Facility: MEDICAL CENTER | Age: 62
End: 2017-12-19
Attending: INTERNAL MEDICINE
Payer: COMMERCIAL

## 2017-12-19 DIAGNOSIS — C50.912 MALIGNANT NEOPLASM OF LEFT FEMALE BREAST, UNSPECIFIED ESTROGEN RECEPTOR STATUS, UNSPECIFIED SITE OF BREAST (HCC): ICD-10-CM

## 2017-12-19 PROCEDURE — A9503 TC99M MEDRONATE: HCPCS

## 2017-12-19 PROCEDURE — 700117 HCHG RX CONTRAST REV CODE 255: Performed by: INTERNAL MEDICINE

## 2017-12-19 PROCEDURE — 71260 CT THORAX DX C+: CPT

## 2017-12-19 RX ADMIN — IOHEXOL 50 ML: 240 INJECTION, SOLUTION INTRATHECAL; INTRAVASCULAR; INTRAVENOUS; ORAL at 11:06

## 2017-12-19 RX ADMIN — IOHEXOL 100 ML: 350 INJECTION, SOLUTION INTRAVENOUS at 11:05

## 2017-12-21 ENCOUNTER — HOSPITAL ENCOUNTER (OUTPATIENT)
Dept: LAB | Facility: MEDICAL CENTER | Age: 62
End: 2017-12-21
Attending: INTERNAL MEDICINE
Payer: COMMERCIAL

## 2017-12-21 PROCEDURE — 84100 ASSAY OF PHOSPHORUS: CPT

## 2017-12-21 PROCEDURE — 86300 IMMUNOASSAY TUMOR CA 15-3: CPT

## 2017-12-21 PROCEDURE — 85730 THROMBOPLASTIN TIME PARTIAL: CPT

## 2017-12-21 PROCEDURE — 83735 ASSAY OF MAGNESIUM: CPT

## 2017-12-21 PROCEDURE — 85610 PROTHROMBIN TIME: CPT

## 2017-12-21 PROCEDURE — 80053 COMPREHEN METABOLIC PANEL: CPT

## 2017-12-22 LAB
ALBUMIN SERPL BCP-MCNC: 4.5 G/DL (ref 3.2–4.9)
ALBUMIN/GLOB SERPL: 2 G/DL
ALP SERPL-CCNC: 64 U/L (ref 30–99)
ALT SERPL-CCNC: 19 U/L (ref 2–50)
ANION GAP SERPL CALC-SCNC: 13 MMOL/L (ref 0–11.9)
APTT PPP: 26.7 SEC (ref 24.7–36)
AST SERPL-CCNC: 46 U/L (ref 12–45)
BILIRUB SERPL-MCNC: 0.8 MG/DL (ref 0.1–1.5)
BUN SERPL-MCNC: 13 MG/DL (ref 8–22)
CALCIUM SERPL-MCNC: 10.2 MG/DL (ref 8.5–10.5)
CHLORIDE SERPL-SCNC: 106 MMOL/L (ref 96–112)
CO2 SERPL-SCNC: 23 MMOL/L (ref 20–33)
CREAT SERPL-MCNC: 0.68 MG/DL (ref 0.5–1.4)
GFR SERPL CREATININE-BSD FRML MDRD: >60 ML/MIN/1.73 M 2
GLOBULIN SER CALC-MCNC: 2.3 G/DL (ref 1.9–3.5)
GLUCOSE SERPL-MCNC: 81 MG/DL (ref 65–99)
INR PPP: 1.08 (ref 0.87–1.13)
MAGNESIUM SERPL-MCNC: 2.1 MG/DL (ref 1.5–2.5)
PHOSPHATE SERPL-MCNC: 2.9 MG/DL (ref 2.5–4.5)
POTASSIUM SERPL-SCNC: 4.2 MMOL/L (ref 3.6–5.5)
PROT SERPL-MCNC: 6.8 G/DL (ref 6–8.2)
PROTHROMBIN TIME: 13.7 SEC (ref 12–14.6)
SODIUM SERPL-SCNC: 142 MMOL/L (ref 135–145)

## 2017-12-23 LAB — CANCER AG27-29 SERPL-ACNC: 142.6 U/ML (ref 0–40)

## 2018-01-01 ENCOUNTER — APPOINTMENT (OUTPATIENT)
Dept: RADIOLOGY | Facility: MEDICAL CENTER | Age: 63
DRG: 374 | End: 2018-01-01
Attending: INTERNAL MEDICINE
Payer: COMMERCIAL

## 2018-01-01 ENCOUNTER — HOSPITAL ENCOUNTER (OUTPATIENT)
Dept: LAB | Facility: MEDICAL CENTER | Age: 63
End: 2018-05-01
Attending: INTERNAL MEDICINE
Payer: COMMERCIAL

## 2018-01-01 ENCOUNTER — APPOINTMENT (OUTPATIENT)
Dept: RADIOLOGY | Facility: MEDICAL CENTER | Age: 63
DRG: 374 | End: 2018-01-01
Attending: EMERGENCY MEDICINE
Payer: COMMERCIAL

## 2018-01-01 ENCOUNTER — HOSPITAL ENCOUNTER (OUTPATIENT)
Dept: LAB | Facility: MEDICAL CENTER | Age: 63
End: 2018-09-17
Attending: INTERNAL MEDICINE
Payer: COMMERCIAL

## 2018-01-01 ENCOUNTER — HOSPITAL ENCOUNTER (OUTPATIENT)
Dept: RADIOLOGY | Facility: MEDICAL CENTER | Age: 63
End: 2018-09-28
Attending: INTERNAL MEDICINE
Payer: COMMERCIAL

## 2018-01-01 ENCOUNTER — HOSPITAL ENCOUNTER (OUTPATIENT)
Dept: LAB | Facility: MEDICAL CENTER | Age: 63
End: 2018-11-12
Attending: NURSE PRACTITIONER
Payer: COMMERCIAL

## 2018-01-01 ENCOUNTER — HOSPITAL ENCOUNTER (OUTPATIENT)
Dept: RADIOLOGY | Facility: MEDICAL CENTER | Age: 63
End: 2018-04-11

## 2018-01-01 ENCOUNTER — OUTPATIENT INFUSION SERVICES (OUTPATIENT)
Dept: ONCOLOGY | Facility: MEDICAL CENTER | Age: 63
End: 2018-01-01
Attending: INTERNAL MEDICINE
Payer: COMMERCIAL

## 2018-01-01 ENCOUNTER — HOSPITAL ENCOUNTER (OUTPATIENT)
Dept: LAB | Facility: MEDICAL CENTER | Age: 63
End: 2018-02-27
Attending: INTERNAL MEDICINE
Payer: COMMERCIAL

## 2018-01-01 ENCOUNTER — HOSPITAL ENCOUNTER (OUTPATIENT)
Dept: LAB | Facility: MEDICAL CENTER | Age: 63
End: 2018-10-30
Attending: INTERNAL MEDICINE
Payer: COMMERCIAL

## 2018-01-01 ENCOUNTER — APPOINTMENT (OUTPATIENT)
Dept: RADIOLOGY | Facility: MEDICAL CENTER | Age: 63
End: 2018-01-01
Attending: SURGERY
Payer: COMMERCIAL

## 2018-01-01 ENCOUNTER — HOSPITAL ENCOUNTER (OUTPATIENT)
Dept: RADIOLOGY | Facility: MEDICAL CENTER | Age: 63
End: 2018-11-21
Attending: INTERNAL MEDICINE
Payer: COMMERCIAL

## 2018-01-01 ENCOUNTER — OFFICE VISIT (OUTPATIENT)
Dept: INFECTIOUS DISEASES | Facility: MEDICAL CENTER | Age: 63
End: 2018-01-01
Payer: COMMERCIAL

## 2018-01-01 ENCOUNTER — HOSPITAL ENCOUNTER (OUTPATIENT)
Dept: LAB | Facility: MEDICAL CENTER | Age: 63
End: 2018-08-28
Attending: INTERNAL MEDICINE
Payer: COMMERCIAL

## 2018-01-01 ENCOUNTER — HOSPITAL ENCOUNTER (OUTPATIENT)
Dept: LAB | Facility: MEDICAL CENTER | Age: 63
End: 2018-06-19
Attending: INTERNAL MEDICINE
Payer: COMMERCIAL

## 2018-01-01 ENCOUNTER — HOSPITAL ENCOUNTER (OUTPATIENT)
Dept: LAB | Facility: MEDICAL CENTER | Age: 63
End: 2018-12-11
Attending: INTERNAL MEDICINE
Payer: COMMERCIAL

## 2018-01-01 ENCOUNTER — HOSPITAL ENCOUNTER (OUTPATIENT)
Dept: LAB | Facility: MEDICAL CENTER | Age: 63
End: 2018-11-19
Attending: NURSE PRACTITIONER
Payer: COMMERCIAL

## 2018-01-01 ENCOUNTER — HOSPITAL ENCOUNTER (OUTPATIENT)
Dept: LAB | Facility: MEDICAL CENTER | Age: 63
End: 2018-05-22
Attending: INTERNAL MEDICINE
Payer: COMMERCIAL

## 2018-01-01 ENCOUNTER — HOSPITAL ENCOUNTER (OUTPATIENT)
Dept: LAB | Facility: MEDICAL CENTER | Age: 63
End: 2018-06-26
Attending: INTERNAL MEDICINE
Payer: COMMERCIAL

## 2018-01-01 ENCOUNTER — HOSPITAL ENCOUNTER (OUTPATIENT)
Dept: LAB | Facility: MEDICAL CENTER | Age: 63
End: 2018-11-12
Attending: INTERNAL MEDICINE
Payer: COMMERCIAL

## 2018-01-01 ENCOUNTER — HOSPITAL ENCOUNTER (OUTPATIENT)
Dept: RADIOLOGY | Facility: MEDICAL CENTER | Age: 63
End: 2018-11-27
Attending: INTERNAL MEDICINE
Payer: COMMERCIAL

## 2018-01-01 ENCOUNTER — HOSPITAL ENCOUNTER (OUTPATIENT)
Dept: RADIATION ONCOLOGY | Facility: MEDICAL CENTER | Age: 63
End: 2018-10-17

## 2018-01-01 ENCOUNTER — PATIENT OUTREACH (OUTPATIENT)
Dept: OTHER | Facility: MEDICAL CENTER | Age: 63
End: 2018-01-01

## 2018-01-01 ENCOUNTER — HOSPITAL ENCOUNTER (OUTPATIENT)
Dept: LAB | Facility: MEDICAL CENTER | Age: 63
End: 2018-12-18
Attending: NURSE PRACTITIONER
Payer: COMMERCIAL

## 2018-01-01 ENCOUNTER — HOSPITAL ENCOUNTER (OUTPATIENT)
Dept: LAB | Facility: MEDICAL CENTER | Age: 63
End: 2018-01-30
Attending: INTERNAL MEDICINE
Payer: COMMERCIAL

## 2018-01-01 ENCOUNTER — HOSPITAL ENCOUNTER (OUTPATIENT)
Dept: RADIATION ONCOLOGY | Facility: MEDICAL CENTER | Age: 63
End: 2018-10-31
Attending: RADIOLOGY
Payer: COMMERCIAL

## 2018-01-01 ENCOUNTER — HOSPITAL ENCOUNTER (INPATIENT)
Facility: MEDICAL CENTER | Age: 63
LOS: 3 days | DRG: 374 | End: 2018-12-25
Attending: EMERGENCY MEDICINE | Admitting: INTERNAL MEDICINE
Payer: COMMERCIAL

## 2018-01-01 ENCOUNTER — HOSPITAL ENCOUNTER (OUTPATIENT)
Dept: LAB | Facility: MEDICAL CENTER | Age: 63
End: 2018-05-29
Attending: INTERNAL MEDICINE
Payer: COMMERCIAL

## 2018-01-01 ENCOUNTER — HOSPITAL ENCOUNTER (OUTPATIENT)
Dept: LAB | Facility: MEDICAL CENTER | Age: 63
End: 2018-02-13
Attending: INTERNAL MEDICINE
Payer: COMMERCIAL

## 2018-01-01 ENCOUNTER — HOSPITAL ENCOUNTER (OUTPATIENT)
Dept: LAB | Facility: MEDICAL CENTER | Age: 63
End: 2018-02-20
Attending: NURSE PRACTITIONER
Payer: COMMERCIAL

## 2018-01-01 ENCOUNTER — HOSPITAL ENCOUNTER (OUTPATIENT)
Dept: LAB | Facility: MEDICAL CENTER | Age: 63
End: 2018-04-24
Attending: INTERNAL MEDICINE
Payer: COMMERCIAL

## 2018-01-01 ENCOUNTER — APPOINTMENT (OUTPATIENT)
Dept: RADIOLOGY | Facility: MEDICAL CENTER | Age: 63
End: 2018-01-01
Attending: RADIOLOGY
Payer: COMMERCIAL

## 2018-01-01 ENCOUNTER — HOSPITAL ENCOUNTER (OUTPATIENT)
Dept: LAB | Facility: MEDICAL CENTER | Age: 63
End: 2018-10-15
Attending: INTERNAL MEDICINE
Payer: COMMERCIAL

## 2018-01-01 ENCOUNTER — HOSPITAL ENCOUNTER (OUTPATIENT)
Dept: LAB | Facility: MEDICAL CENTER | Age: 63
End: 2018-01-16
Attending: INTERNAL MEDICINE
Payer: COMMERCIAL

## 2018-01-01 ENCOUNTER — HOSPITAL ENCOUNTER (OUTPATIENT)
Dept: LAB | Facility: MEDICAL CENTER | Age: 63
End: 2018-10-25
Attending: NURSE PRACTITIONER
Payer: COMMERCIAL

## 2018-01-01 ENCOUNTER — HOSPITAL ENCOUNTER (OUTPATIENT)
Dept: RADIOLOGY | Facility: MEDICAL CENTER | Age: 63
End: 2018-04-16
Attending: SURGERY
Payer: COMMERCIAL

## 2018-01-01 ENCOUNTER — HOSPITAL ENCOUNTER (OUTPATIENT)
Facility: MEDICAL CENTER | Age: 63
End: 2018-08-31
Attending: SURGERY | Admitting: SURGERY
Payer: COMMERCIAL

## 2018-01-01 ENCOUNTER — HOSPITAL ENCOUNTER (OUTPATIENT)
Dept: LAB | Facility: MEDICAL CENTER | Age: 63
End: 2018-06-12
Attending: INTERNAL MEDICINE
Payer: COMMERCIAL

## 2018-01-01 ENCOUNTER — HOSPITAL ENCOUNTER (OUTPATIENT)
Facility: MEDICAL CENTER | Age: 63
End: 2018-01-10
Attending: INTERNAL MEDICINE | Admitting: INTERNAL MEDICINE
Payer: COMMERCIAL

## 2018-01-01 ENCOUNTER — APPOINTMENT (OUTPATIENT)
Dept: RADIOLOGY | Facility: MEDICAL CENTER | Age: 63
DRG: 374 | End: 2018-01-01
Attending: FAMILY MEDICINE
Payer: COMMERCIAL

## 2018-01-01 ENCOUNTER — HOSPITAL ENCOUNTER (OUTPATIENT)
Dept: LAB | Facility: MEDICAL CENTER | Age: 63
End: 2018-08-07
Attending: INTERNAL MEDICINE
Payer: COMMERCIAL

## 2018-01-01 ENCOUNTER — APPOINTMENT (OUTPATIENT)
Dept: CARDIOLOGY | Facility: MEDICAL CENTER | Age: 63
DRG: 374 | End: 2018-01-01
Attending: INTERNAL MEDICINE
Payer: COMMERCIAL

## 2018-01-01 ENCOUNTER — APPOINTMENT (OUTPATIENT)
Dept: ADMISSIONS | Facility: MEDICAL CENTER | Age: 63
End: 2018-01-01
Attending: INTERNAL MEDICINE
Payer: COMMERCIAL

## 2018-01-01 ENCOUNTER — HOSPITAL ENCOUNTER (OUTPATIENT)
Dept: LAB | Facility: MEDICAL CENTER | Age: 63
End: 2018-03-20
Attending: INTERNAL MEDICINE
Payer: COMMERCIAL

## 2018-01-01 ENCOUNTER — HOSPITAL ENCOUNTER (OUTPATIENT)
Dept: LAB | Facility: MEDICAL CENTER | Age: 63
End: 2018-05-15
Attending: INTERNAL MEDICINE
Payer: COMMERCIAL

## 2018-01-01 ENCOUNTER — APPOINTMENT (OUTPATIENT)
Dept: RADIOLOGY | Facility: MEDICAL CENTER | Age: 63
End: 2018-01-01
Attending: INTERNAL MEDICINE
Payer: COMMERCIAL

## 2018-01-01 ENCOUNTER — HOSPITAL ENCOUNTER (OUTPATIENT)
Dept: LAB | Facility: MEDICAL CENTER | Age: 63
End: 2018-07-17
Attending: INTERNAL MEDICINE
Payer: COMMERCIAL

## 2018-01-01 ENCOUNTER — HOSPITAL ENCOUNTER (OUTPATIENT)
Dept: RADIATION ONCOLOGY | Facility: MEDICAL CENTER | Age: 63
End: 2018-10-25

## 2018-01-01 ENCOUNTER — HOSPITAL ENCOUNTER (OUTPATIENT)
Dept: RADIOLOGY | Facility: MEDICAL CENTER | Age: 63
End: 2018-07-16
Attending: INTERNAL MEDICINE
Payer: COMMERCIAL

## 2018-01-01 ENCOUNTER — HOSPITAL ENCOUNTER (OUTPATIENT)
Dept: LAB | Facility: MEDICAL CENTER | Age: 63
End: 2018-02-15
Attending: INTERNAL MEDICINE
Payer: COMMERCIAL

## 2018-01-01 ENCOUNTER — HOSPITAL ENCOUNTER (OUTPATIENT)
Dept: LAB | Facility: MEDICAL CENTER | Age: 63
End: 2018-02-20
Attending: INTERNAL MEDICINE
Payer: COMMERCIAL

## 2018-01-01 ENCOUNTER — NON-PROVIDER VISIT (OUTPATIENT)
Dept: URGENT CARE | Facility: PHYSICIAN GROUP | Age: 63
End: 2018-01-01

## 2018-01-01 ENCOUNTER — HOSPITAL ENCOUNTER (OUTPATIENT)
Dept: LAB | Facility: MEDICAL CENTER | Age: 63
End: 2018-06-05
Attending: INTERNAL MEDICINE
Payer: COMMERCIAL

## 2018-01-01 ENCOUNTER — APPOINTMENT (OUTPATIENT)
Dept: RADIOLOGY | Facility: IMAGING CENTER | Age: 63
End: 2018-01-01
Attending: NURSE PRACTITIONER
Payer: COMMERCIAL

## 2018-01-01 ENCOUNTER — HOSPITAL ENCOUNTER (OUTPATIENT)
Dept: LAB | Facility: MEDICAL CENTER | Age: 63
End: 2018-04-09
Attending: INTERNAL MEDICINE
Payer: COMMERCIAL

## 2018-01-01 ENCOUNTER — HOSPITAL ENCOUNTER (OUTPATIENT)
Dept: LAB | Facility: MEDICAL CENTER | Age: 63
End: 2018-10-11
Attending: NURSE PRACTITIONER
Payer: COMMERCIAL

## 2018-01-01 ENCOUNTER — HOSPITAL ENCOUNTER (OUTPATIENT)
Dept: LAB | Facility: MEDICAL CENTER | Age: 63
End: 2018-02-06
Attending: INTERNAL MEDICINE
Payer: COMMERCIAL

## 2018-01-01 ENCOUNTER — HOSPITAL ENCOUNTER (OUTPATIENT)
Dept: LAB | Facility: MEDICAL CENTER | Age: 63
End: 2018-03-06
Attending: INTERNAL MEDICINE
Payer: COMMERCIAL

## 2018-01-01 ENCOUNTER — HOSPITAL ENCOUNTER (OUTPATIENT)
Dept: LAB | Facility: MEDICAL CENTER | Age: 63
End: 2018-03-01
Attending: SPECIALIST
Payer: COMMERCIAL

## 2018-01-01 VITALS
OXYGEN SATURATION: 100 % | SYSTOLIC BLOOD PRESSURE: 133 MMHG | HEART RATE: 61 BPM | TEMPERATURE: 97.5 F | BODY MASS INDEX: 19.14 KG/M2 | DIASTOLIC BLOOD PRESSURE: 76 MMHG | RESPIRATION RATE: 14 BRPM | WEIGHT: 108.03 LBS

## 2018-01-01 VITALS
OXYGEN SATURATION: 97 % | HEART RATE: 92 BPM | TEMPERATURE: 98.4 F | SYSTOLIC BLOOD PRESSURE: 120 MMHG | WEIGHT: 137.4 LBS | BODY MASS INDEX: 24.34 KG/M2 | HEIGHT: 63 IN | DIASTOLIC BLOOD PRESSURE: 70 MMHG

## 2018-01-01 VITALS
BODY MASS INDEX: 18.23 KG/M2 | HEIGHT: 61 IN | DIASTOLIC BLOOD PRESSURE: 71 MMHG | HEART RATE: 90 BPM | RESPIRATION RATE: 18 BRPM | WEIGHT: 96.56 LBS | TEMPERATURE: 98.3 F | OXYGEN SATURATION: 98 % | SYSTOLIC BLOOD PRESSURE: 114 MMHG

## 2018-01-01 VITALS
WEIGHT: 83.6 LBS | DIASTOLIC BLOOD PRESSURE: 60 MMHG | HEART RATE: 93 BPM | SYSTOLIC BLOOD PRESSURE: 110 MMHG | TEMPERATURE: 98 F | BODY MASS INDEX: 15.78 KG/M2 | OXYGEN SATURATION: 99 % | HEIGHT: 61 IN

## 2018-01-01 VITALS
WEIGHT: 116 LBS | RESPIRATION RATE: 16 BRPM | HEART RATE: 87 BPM | BODY MASS INDEX: 20.55 KG/M2 | DIASTOLIC BLOOD PRESSURE: 80 MMHG | OXYGEN SATURATION: 100 % | SYSTOLIC BLOOD PRESSURE: 128 MMHG | HEIGHT: 63 IN | TEMPERATURE: 99.1 F

## 2018-01-01 VITALS
BODY MASS INDEX: 18.36 KG/M2 | SYSTOLIC BLOOD PRESSURE: 57 MMHG | HEART RATE: 65 BPM | DIASTOLIC BLOOD PRESSURE: 32 MMHG | WEIGHT: 97.22 LBS | OXYGEN SATURATION: 88 % | RESPIRATION RATE: 22 BRPM | TEMPERATURE: 97.3 F

## 2018-01-01 VITALS
WEIGHT: 100.3 LBS | OXYGEN SATURATION: 100 % | HEART RATE: 99 BPM | TEMPERATURE: 98.3 F | DIASTOLIC BLOOD PRESSURE: 65 MMHG | SYSTOLIC BLOOD PRESSURE: 126 MMHG | BODY MASS INDEX: 17.77 KG/M2

## 2018-01-01 VITALS
SYSTOLIC BLOOD PRESSURE: 119 MMHG | RESPIRATION RATE: 18 BRPM | HEART RATE: 109 BPM | HEIGHT: 61 IN | OXYGEN SATURATION: 99 % | BODY MASS INDEX: 17.9 KG/M2 | WEIGHT: 94.8 LBS | TEMPERATURE: 99 F | DIASTOLIC BLOOD PRESSURE: 67 MMHG

## 2018-01-01 VITALS
WEIGHT: 97.22 LBS | HEART RATE: 87 BPM | RESPIRATION RATE: 18 BRPM | TEMPERATURE: 98.2 F | BODY MASS INDEX: 18.36 KG/M2 | HEIGHT: 61 IN | OXYGEN SATURATION: 98 % | SYSTOLIC BLOOD PRESSURE: 115 MMHG | DIASTOLIC BLOOD PRESSURE: 64 MMHG

## 2018-01-01 VITALS
HEART RATE: 78 BPM | TEMPERATURE: 97.9 F | RESPIRATION RATE: 16 BRPM | HEIGHT: 63 IN | WEIGHT: 135.36 LBS | BODY MASS INDEX: 23.98 KG/M2 | OXYGEN SATURATION: 96 %

## 2018-01-01 VITALS
WEIGHT: 100 LBS | OXYGEN SATURATION: 99 % | HEIGHT: 61 IN | HEART RATE: 101 BPM | SYSTOLIC BLOOD PRESSURE: 100 MMHG | BODY MASS INDEX: 18.88 KG/M2 | DIASTOLIC BLOOD PRESSURE: 58 MMHG | TEMPERATURE: 98.6 F

## 2018-01-01 DIAGNOSIS — Z51.11 ENCOUNTER FOR ANTINEOPLASTIC CHEMOTHERAPY: ICD-10-CM

## 2018-01-01 DIAGNOSIS — B38.9 COCCIDIOIDOMYCOSIS: ICD-10-CM

## 2018-01-01 DIAGNOSIS — Z85.3 HX OF BREAST CANCER: ICD-10-CM

## 2018-01-01 DIAGNOSIS — Z17.0 MALIGNANT NEOPLASM OF LOWER-OUTER QUADRANT OF LEFT BREAST OF FEMALE, ESTROGEN RECEPTOR POSITIVE (HCC): ICD-10-CM

## 2018-01-01 DIAGNOSIS — D72.818 OTHER DECREASED WHITE BLOOD CELL (WBC) COUNT: ICD-10-CM

## 2018-01-01 DIAGNOSIS — C50.912 MALIGNANT NEOPLASM OF LEFT FEMALE BREAST, UNSPECIFIED ESTROGEN RECEPTOR STATUS, UNSPECIFIED SITE OF BREAST (HCC): ICD-10-CM

## 2018-01-01 DIAGNOSIS — C79.52 SECONDARY MALIGNANT NEOPLASM OF BONE AND BONE MARROW (HCC): ICD-10-CM

## 2018-01-01 DIAGNOSIS — C50.512 MALIGNANT NEOPLASM OF LOWER-OUTER QUADRANT OF LEFT BREAST OF FEMALE, ESTROGEN RECEPTOR POSITIVE (HCC): ICD-10-CM

## 2018-01-01 DIAGNOSIS — D64.81 ANEMIA DUE TO ANTINEOPLASTIC CHEMOTHERAPY: ICD-10-CM

## 2018-01-01 DIAGNOSIS — C78.7: ICD-10-CM

## 2018-01-01 DIAGNOSIS — N63.0 LUMP OR MASS IN BREAST: ICD-10-CM

## 2018-01-01 DIAGNOSIS — C50.919 CARCINOMA OF BREAST METASTATIC TO BONE, UNSPECIFIED LATERALITY (HCC): ICD-10-CM

## 2018-01-01 DIAGNOSIS — R07.9 CHEST PAIN, UNSPECIFIED TYPE: ICD-10-CM

## 2018-01-01 DIAGNOSIS — D64.9 ANEMIA, UNSPECIFIED TYPE: ICD-10-CM

## 2018-01-01 DIAGNOSIS — C79.51 CARCINOMA OF BREAST METASTATIC TO BONE, UNSPECIFIED LATERALITY (HCC): ICD-10-CM

## 2018-01-01 DIAGNOSIS — C54.1 MALIGNANT NEOPLASM OF ENDOMETRIUM (HCC): ICD-10-CM

## 2018-01-01 DIAGNOSIS — Z01.812 PRE-OPERATIVE LABORATORY EXAMINATION: ICD-10-CM

## 2018-01-01 DIAGNOSIS — C79.51 SECONDARY MALIGNANT NEOPLASM OF BONE AND BONE MARROW (HCC): ICD-10-CM

## 2018-01-01 DIAGNOSIS — T45.1X5A ANEMIA DUE TO ANTINEOPLASTIC CHEMOTHERAPY: ICD-10-CM

## 2018-01-01 LAB
ABO GROUP BLD: NORMAL
ABO GROUP BLD: NORMAL
ALBUMIN SERPL BCP-MCNC: 3.3 G/DL (ref 3.2–4.9)
ALBUMIN SERPL BCP-MCNC: 3.4 G/DL (ref 3.2–4.9)
ALBUMIN SERPL BCP-MCNC: 3.5 G/DL (ref 3.2–4.9)
ALBUMIN SERPL BCP-MCNC: 3.6 G/DL (ref 3.2–4.9)
ALBUMIN SERPL BCP-MCNC: 3.8 G/DL (ref 3.2–4.9)
ALBUMIN SERPL BCP-MCNC: 3.9 G/DL (ref 3.2–4.9)
ALBUMIN SERPL BCP-MCNC: 4.1 G/DL (ref 3.2–4.9)
ALBUMIN SERPL BCP-MCNC: 4.2 G/DL (ref 3.2–4.9)
ALBUMIN SERPL BCP-MCNC: 4.2 G/DL (ref 3.2–4.9)
ALBUMIN SERPL BCP-MCNC: 4.3 G/DL (ref 3.2–4.9)
ALBUMIN SERPL BCP-MCNC: 4.5 G/DL (ref 3.2–4.9)
ALBUMIN SERPL BCP-MCNC: 4.6 G/DL (ref 3.2–4.9)
ALBUMIN SERPL BCP-MCNC: 4.6 G/DL (ref 3.2–4.9)
ALBUMIN SERPL BCP-MCNC: 5.4 G/DL (ref 3.2–4.9)
ALBUMIN/GLOB SERPL: 1 G/DL
ALBUMIN/GLOB SERPL: 1.1 G/DL
ALBUMIN/GLOB SERPL: 1.1 G/DL
ALBUMIN/GLOB SERPL: 1.2 G/DL
ALBUMIN/GLOB SERPL: 1.3 G/DL
ALBUMIN/GLOB SERPL: 1.5 G/DL
ALBUMIN/GLOB SERPL: 1.6 G/DL
ALBUMIN/GLOB SERPL: 1.7 G/DL
ALBUMIN/GLOB SERPL: 1.8 G/DL
ALBUMIN/GLOB SERPL: 1.9 G/DL
ALBUMIN/GLOB SERPL: 2 G/DL
ALBUMIN/GLOB SERPL: 2 G/DL
ALBUMIN/GLOB SERPL: 2.1 G/DL
ALBUMIN/GLOB SERPL: 3.4 G/DL
ALP SERPL-CCNC: 100 U/L (ref 30–99)
ALP SERPL-CCNC: 101 U/L (ref 30–99)
ALP SERPL-CCNC: 101 U/L (ref 30–99)
ALP SERPL-CCNC: 106 U/L (ref 30–99)
ALP SERPL-CCNC: 108 U/L (ref 30–99)
ALP SERPL-CCNC: 125 U/L (ref 30–99)
ALP SERPL-CCNC: 184 U/L (ref 30–99)
ALP SERPL-CCNC: 251 U/L (ref 30–99)
ALP SERPL-CCNC: 55 U/L (ref 30–99)
ALP SERPL-CCNC: 61 U/L (ref 30–99)
ALP SERPL-CCNC: 64 U/L (ref 30–99)
ALP SERPL-CCNC: 67 U/L (ref 30–99)
ALP SERPL-CCNC: 70 U/L (ref 30–99)
ALP SERPL-CCNC: 72 U/L (ref 30–99)
ALP SERPL-CCNC: 76 U/L (ref 30–99)
ALP SERPL-CCNC: 81 U/L (ref 30–99)
ALP SERPL-CCNC: 86 U/L (ref 30–99)
ALP SERPL-CCNC: 90 U/L (ref 30–99)
ALP SERPL-CCNC: 90 U/L (ref 30–99)
ALP SERPL-CCNC: 91 U/L (ref 30–99)
ALP SERPL-CCNC: 92 U/L (ref 30–99)
ALP SERPL-CCNC: 92 U/L (ref 30–99)
ALP SERPL-CCNC: 96 U/L (ref 30–99)
ALT SERPL-CCNC: 14 U/L (ref 2–50)
ALT SERPL-CCNC: 14 U/L (ref 2–50)
ALT SERPL-CCNC: 15 U/L (ref 2–50)
ALT SERPL-CCNC: 15 U/L (ref 2–50)
ALT SERPL-CCNC: 16 U/L (ref 2–50)
ALT SERPL-CCNC: 17 U/L (ref 2–50)
ALT SERPL-CCNC: 17 U/L (ref 2–50)
ALT SERPL-CCNC: 18 U/L (ref 2–50)
ALT SERPL-CCNC: 20 U/L (ref 2–50)
ALT SERPL-CCNC: 21 U/L (ref 2–50)
ALT SERPL-CCNC: 21 U/L (ref 2–50)
ALT SERPL-CCNC: 22 U/L (ref 2–50)
ALT SERPL-CCNC: 23 U/L (ref 2–50)
ALT SERPL-CCNC: 24 U/L (ref 2–50)
ALT SERPL-CCNC: 27 U/L (ref 2–50)
ALT SERPL-CCNC: 32 U/L (ref 2–50)
ANION GAP SERPL CALC-SCNC: 10 MMOL/L (ref 0–11.9)
ANION GAP SERPL CALC-SCNC: 11 MMOL/L (ref 0–11.9)
ANION GAP SERPL CALC-SCNC: 12 MMOL/L (ref 0–11.9)
ANION GAP SERPL CALC-SCNC: 12 MMOL/L (ref 0–11.9)
ANION GAP SERPL CALC-SCNC: 13 MMOL/L (ref 0–11.9)
ANION GAP SERPL CALC-SCNC: 13 MMOL/L (ref 0–11.9)
ANION GAP SERPL CALC-SCNC: 14 MMOL/L (ref 0–11.9)
ANION GAP SERPL CALC-SCNC: 15 MMOL/L (ref 0–11.9)
ANION GAP SERPL CALC-SCNC: 15 MMOL/L (ref 0–11.9)
ANION GAP SERPL CALC-SCNC: 16 MMOL/L (ref 0–11.9)
ANION GAP SERPL CALC-SCNC: 20 MMOL/L (ref 0–11.9)
ANION GAP SERPL CALC-SCNC: 9 MMOL/L (ref 0–11.9)
ANION GAP SERPL CALC-SCNC: 9 MMOL/L (ref 0–11.9)
ANISOCYTOSIS BLD QL SMEAR: ABNORMAL
APTT PPP: 25.1 SEC (ref 24.7–36)
AST SERPL-CCNC: 126 U/L (ref 12–45)
AST SERPL-CCNC: 30 U/L (ref 12–45)
AST SERPL-CCNC: 34 U/L (ref 12–45)
AST SERPL-CCNC: 35 U/L (ref 12–45)
AST SERPL-CCNC: 36 U/L (ref 12–45)
AST SERPL-CCNC: 37 U/L (ref 12–45)
AST SERPL-CCNC: 39 U/L (ref 12–45)
AST SERPL-CCNC: 40 U/L (ref 12–45)
AST SERPL-CCNC: 41 U/L (ref 12–45)
AST SERPL-CCNC: 42 U/L (ref 12–45)
AST SERPL-CCNC: 43 U/L (ref 12–45)
AST SERPL-CCNC: 46 U/L (ref 12–45)
AST SERPL-CCNC: 58 U/L (ref 12–45)
AST SERPL-CCNC: 59 U/L (ref 12–45)
AST SERPL-CCNC: 76 U/L (ref 12–45)
AST SERPL-CCNC: 78 U/L (ref 12–45)
AST SERPL-CCNC: 81 U/L (ref 12–45)
AST SERPL-CCNC: 83 U/L (ref 12–45)
AST SERPL-CCNC: 85 U/L (ref 12–45)
AST SERPL-CCNC: 88 U/L (ref 12–45)
AST SERPL-CCNC: 97 U/L (ref 12–45)
BARCODED ABORH UBTYP: 6200
BARCODED PRD CODE UBPRD: NORMAL
BARCODED UNIT NUM UBUNT: NORMAL
BASE EXCESS BLDA CALC-SCNC: -2 MMOL/L (ref -4–3)
BASOPHILS # BLD AUTO: 0 % (ref 0–1.8)
BASOPHILS # BLD AUTO: 0.2 % (ref 0–1.8)
BASOPHILS # BLD AUTO: 0.3 % (ref 0–1.8)
BASOPHILS # BLD AUTO: 0.4 % (ref 0–1.8)
BASOPHILS # BLD AUTO: 0.5 % (ref 0–1.8)
BASOPHILS # BLD AUTO: 0.7 % (ref 0–1.8)
BASOPHILS # BLD AUTO: 0.8 % (ref 0–1.8)
BASOPHILS # BLD AUTO: 0.9 % (ref 0–1.8)
BASOPHILS # BLD AUTO: 1 % (ref 0–1.8)
BASOPHILS # BLD AUTO: 1.1 % (ref 0–1.8)
BASOPHILS # BLD AUTO: 1.1 % (ref 0–1.8)
BASOPHILS # BLD AUTO: 1.3 % (ref 0–1.8)
BASOPHILS # BLD AUTO: 1.3 % (ref 0–1.8)
BASOPHILS # BLD AUTO: 1.4 % (ref 0–1.8)
BASOPHILS # BLD AUTO: 1.6 % (ref 0–1.8)
BASOPHILS # BLD AUTO: 1.8 % (ref 0–1.8)
BASOPHILS # BLD AUTO: 2.7 % (ref 0–1.8)
BASOPHILS # BLD: 0 K/UL (ref 0–0.12)
BASOPHILS # BLD: 0.01 K/UL (ref 0–0.12)
BASOPHILS # BLD: 0.02 K/UL (ref 0–0.12)
BASOPHILS # BLD: 0.03 K/UL (ref 0–0.12)
BASOPHILS # BLD: 0.04 K/UL (ref 0–0.12)
BASOPHILS # BLD: 0.05 K/UL (ref 0–0.12)
BASOPHILS # BLD: 0.12 K/UL (ref 0–0.12)
BASOPHILS # BLD: 0.21 K/UL (ref 0–0.12)
BILIRUB CONJ SERPL-MCNC: <0.1 MG/DL (ref 0.1–0.5)
BILIRUB INDIRECT SERPL-MCNC: NORMAL MG/DL (ref 0–1)
BILIRUB SERPL-MCNC: 0.3 MG/DL (ref 0.1–1.5)
BILIRUB SERPL-MCNC: 0.4 MG/DL (ref 0.1–1.5)
BILIRUB SERPL-MCNC: 0.5 MG/DL (ref 0.1–1.5)
BILIRUB SERPL-MCNC: 0.6 MG/DL (ref 0.1–1.5)
BILIRUB SERPL-MCNC: 0.6 MG/DL (ref 0.1–1.5)
BILIRUB SERPL-MCNC: 0.7 MG/DL (ref 0.1–1.5)
BLD GP AB SCN SERPL QL: NORMAL
BLD GP AB SCN SERPL QL: NORMAL
BNP SERPL-MCNC: 102 PG/ML (ref 0–100)
BODY TEMPERATURE: ABNORMAL CENTIGRADE
BUN SERPL-MCNC: 11 MG/DL (ref 8–22)
BUN SERPL-MCNC: 12 MG/DL (ref 8–22)
BUN SERPL-MCNC: 13 MG/DL (ref 8–22)
BUN SERPL-MCNC: 14 MG/DL (ref 8–22)
BUN SERPL-MCNC: 15 MG/DL (ref 8–22)
BUN SERPL-MCNC: 16 MG/DL (ref 8–22)
BUN SERPL-MCNC: 17 MG/DL (ref 8–22)
BUN SERPL-MCNC: 17 MG/DL (ref 8–22)
BUN SERPL-MCNC: 20 MG/DL (ref 8–22)
BUN SERPL-MCNC: 21 MG/DL (ref 8–22)
BUN SERPL-MCNC: 27 MG/DL (ref 8–22)
BUN SERPL-MCNC: 35 MG/DL (ref 8–22)
BUN SERPL-MCNC: 35 MG/DL (ref 8–22)
C IMMITIS IGM SPEC QL IA: 0.1 IV
C IMMITIS IGM SPEC QL IA: 0.2 IV
C IMMITIS IGM SPEC QL IA: 0.2 IV
CALCIUM SERPL-MCNC: 10.1 MG/DL (ref 8.5–10.5)
CALCIUM SERPL-MCNC: 10.1 MG/DL (ref 8.5–10.5)
CALCIUM SERPL-MCNC: 10.2 MG/DL (ref 8.5–10.5)
CALCIUM SERPL-MCNC: 10.4 MG/DL (ref 8.5–10.5)
CALCIUM SERPL-MCNC: 8.6 MG/DL (ref 8.5–10.5)
CALCIUM SERPL-MCNC: 9.2 MG/DL (ref 8.5–10.5)
CALCIUM SERPL-MCNC: 9.3 MG/DL (ref 8.5–10.5)
CALCIUM SERPL-MCNC: 9.3 MG/DL (ref 8.5–10.5)
CALCIUM SERPL-MCNC: 9.4 MG/DL (ref 8.5–10.5)
CALCIUM SERPL-MCNC: 9.5 MG/DL (ref 8.5–10.5)
CALCIUM SERPL-MCNC: 9.6 MG/DL (ref 8.5–10.5)
CALCIUM SERPL-MCNC: 9.6 MG/DL (ref 8.5–10.5)
CALCIUM SERPL-MCNC: 9.7 MG/DL (ref 8.5–10.5)
CALCIUM SERPL-MCNC: 9.8 MG/DL (ref 8.5–10.5)
CANCER AG125 SERPL-ACNC: 12.2 U/ML (ref 0–35)
CANCER AG27-29 SERPL-ACNC: 191.9 U/ML (ref 0–40)
CANCER AG27-29 SERPL-ACNC: 192.2 U/ML (ref 0–40)
CANCER AG27-29 SERPL-ACNC: 201.4 U/ML (ref 0–40)
CANCER AG27-29 SERPL-ACNC: 205.4 U/ML (ref 0–40)
CANCER AG27-29 SERPL-ACNC: 220.4 U/ML (ref 0–40)
CANCER AG27-29 SERPL-ACNC: 228.4 U/ML (ref 0–40)
CANCER AG27-29 SERPL-ACNC: 230.3 U/ML (ref 0–40)
CANCER AG27-29 SERPL-ACNC: 250.2 U/ML (ref 0–40)
CANCER AG27-29 SERPL-ACNC: 335.9 U/ML (ref 0–40)
CANCER AG27-29 SERPL-ACNC: 338.5 U/ML (ref 0–40)
CANCER AG27-29 SERPL-ACNC: 350.5 U/ML (ref 0–40)
CANCER AG27-29 SERPL-ACNC: 350.6 U/ML (ref 0–40)
CANCER AG27-29 SERPL-ACNC: 365 U/ML (ref 0–40)
CANCER AG27-29 SERPL-ACNC: 518.2 U/ML (ref 0–40)
CHLORIDE SERPL-SCNC: 100 MMOL/L (ref 96–112)
CHLORIDE SERPL-SCNC: 101 MMOL/L (ref 96–112)
CHLORIDE SERPL-SCNC: 101 MMOL/L (ref 96–112)
CHLORIDE SERPL-SCNC: 102 MMOL/L (ref 96–112)
CHLORIDE SERPL-SCNC: 102 MMOL/L (ref 96–112)
CHLORIDE SERPL-SCNC: 103 MMOL/L (ref 96–112)
CHLORIDE SERPL-SCNC: 103 MMOL/L (ref 96–112)
CHLORIDE SERPL-SCNC: 104 MMOL/L (ref 96–112)
CHLORIDE SERPL-SCNC: 105 MMOL/L (ref 96–112)
CHLORIDE SERPL-SCNC: 106 MMOL/L (ref 96–112)
CHLORIDE SERPL-SCNC: 106 MMOL/L (ref 96–112)
CHLORIDE SERPL-SCNC: 107 MMOL/L (ref 96–112)
CHLORIDE SERPL-SCNC: 107 MMOL/L (ref 96–112)
CHLORIDE SERPL-SCNC: 108 MMOL/L (ref 96–112)
CHLORIDE SERPL-SCNC: 109 MMOL/L (ref 96–112)
CHLORIDE SERPL-SCNC: 94 MMOL/L (ref 96–112)
CHLORIDE SERPL-SCNC: 96 MMOL/L (ref 96–112)
CHLORIDE SERPL-SCNC: 99 MMOL/L (ref 96–112)
CHOLEST SERPL-MCNC: 181 MG/DL (ref 100–199)
CHOLEST SERPL-MCNC: 224 MG/DL (ref 100–199)
CHOLEST SERPL-MCNC: 229 MG/DL (ref 100–199)
CHOLEST SERPL-MCNC: 244 MG/DL (ref 100–199)
CHOLEST SERPL-MCNC: 245 MG/DL (ref 100–199)
CHOLEST SERPL-MCNC: 247 MG/DL (ref 100–199)
CHOLEST SERPL-MCNC: 248 MG/DL (ref 100–199)
CHOLEST SERPL-MCNC: 255 MG/DL (ref 100–199)
CO2 SERPL-SCNC: 20 MMOL/L (ref 20–33)
CO2 SERPL-SCNC: 22 MMOL/L (ref 20–33)
CO2 SERPL-SCNC: 23 MMOL/L (ref 20–33)
CO2 SERPL-SCNC: 23 MMOL/L (ref 20–33)
CO2 SERPL-SCNC: 24 MMOL/L (ref 20–33)
CO2 SERPL-SCNC: 25 MMOL/L (ref 20–33)
CO2 SERPL-SCNC: 26 MMOL/L (ref 20–33)
CO2 SERPL-SCNC: 27 MMOL/L (ref 20–33)
CO2 SERPL-SCNC: 27 MMOL/L (ref 20–33)
COCCIDIOIDES AB SPEC QL ID: NORMAL
COCCIDIOIDES AB TITR SER CF: NORMAL {TITER}
COCCIDIOIDES IGG SPEC QL IA: 0.1 IV
COCCIDIOIDES IGG SPEC QL IA: 0.1 IV
COCCIDIOIDES IGG SPEC QL IA: 0.2 IV
COMPONENT R 8504R: NORMAL
CREAT SERPL-MCNC: 0.74 MG/DL (ref 0.5–1.4)
CREAT SERPL-MCNC: 0.81 MG/DL (ref 0.5–1.4)
CREAT SERPL-MCNC: 0.81 MG/DL (ref 0.5–1.4)
CREAT SERPL-MCNC: 0.82 MG/DL (ref 0.5–1.4)
CREAT SERPL-MCNC: 0.84 MG/DL (ref 0.5–1.4)
CREAT SERPL-MCNC: 0.84 MG/DL (ref 0.5–1.4)
CREAT SERPL-MCNC: 0.86 MG/DL (ref 0.5–1.4)
CREAT SERPL-MCNC: 0.87 MG/DL (ref 0.5–1.4)
CREAT SERPL-MCNC: 0.88 MG/DL (ref 0.5–1.4)
CREAT SERPL-MCNC: 0.88 MG/DL (ref 0.5–1.4)
CREAT SERPL-MCNC: 0.89 MG/DL (ref 0.5–1.4)
CREAT SERPL-MCNC: 0.89 MG/DL (ref 0.5–1.4)
CREAT SERPL-MCNC: 0.91 MG/DL (ref 0.5–1.4)
CREAT SERPL-MCNC: 0.92 MG/DL (ref 0.5–1.4)
CREAT SERPL-MCNC: 0.95 MG/DL (ref 0.5–1.4)
CREAT SERPL-MCNC: 0.95 MG/DL (ref 0.5–1.4)
CREAT SERPL-MCNC: 0.97 MG/DL (ref 0.5–1.4)
CREAT SERPL-MCNC: 1 MG/DL (ref 0.5–1.4)
CREAT SERPL-MCNC: 1.05 MG/DL (ref 0.5–1.4)
CREAT SERPL-MCNC: 1.07 MG/DL (ref 0.5–1.4)
CREAT SERPL-MCNC: 1.07 MG/DL (ref 0.5–1.4)
CREAT SERPL-MCNC: 1.09 MG/DL (ref 0.5–1.4)
CREAT SERPL-MCNC: 1.16 MG/DL (ref 0.5–1.4)
CREAT SERPL-MCNC: 1.17 MG/DL (ref 0.5–1.4)
CYTOLOGY REG CYTOL: NORMAL
D DIMER PPP IA.FEU-MCNC: 9.51 UG/ML (FEU) (ref 0–0.5)
DACRYOCYTES BLD QL SMEAR: NORMAL
EKG IMPRESSION: NORMAL
EKG IMPRESSION: NORMAL
EOSINOPHIL # BLD AUTO: 0 K/UL (ref 0–0.51)
EOSINOPHIL # BLD AUTO: 0.01 K/UL (ref 0–0.51)
EOSINOPHIL # BLD AUTO: 0.01 K/UL (ref 0–0.51)
EOSINOPHIL # BLD AUTO: 0.03 K/UL (ref 0–0.51)
EOSINOPHIL # BLD AUTO: 0.04 K/UL (ref 0–0.51)
EOSINOPHIL # BLD AUTO: 0.05 K/UL (ref 0–0.51)
EOSINOPHIL # BLD AUTO: 0.06 K/UL (ref 0–0.51)
EOSINOPHIL # BLD AUTO: 0.07 K/UL (ref 0–0.51)
EOSINOPHIL # BLD AUTO: 0.08 K/UL (ref 0–0.51)
EOSINOPHIL # BLD AUTO: 0.08 K/UL (ref 0–0.51)
EOSINOPHIL # BLD AUTO: 0.09 K/UL (ref 0–0.51)
EOSINOPHIL # BLD AUTO: 0.1 K/UL (ref 0–0.51)
EOSINOPHIL # BLD AUTO: 0.12 K/UL (ref 0–0.51)
EOSINOPHIL NFR BLD: 0 % (ref 0–6.9)
EOSINOPHIL NFR BLD: 0.2 % (ref 0–6.9)
EOSINOPHIL NFR BLD: 0.2 % (ref 0–6.9)
EOSINOPHIL NFR BLD: 0.8 % (ref 0–6.9)
EOSINOPHIL NFR BLD: 1.1 % (ref 0–6.9)
EOSINOPHIL NFR BLD: 1.3 % (ref 0–6.9)
EOSINOPHIL NFR BLD: 1.5 % (ref 0–6.9)
EOSINOPHIL NFR BLD: 1.7 % (ref 0–6.9)
EOSINOPHIL NFR BLD: 1.9 % (ref 0–6.9)
EOSINOPHIL NFR BLD: 1.9 % (ref 0–6.9)
EOSINOPHIL NFR BLD: 2.1 % (ref 0–6.9)
EOSINOPHIL NFR BLD: 2.2 % (ref 0–6.9)
EOSINOPHIL NFR BLD: 2.3 % (ref 0–6.9)
EOSINOPHIL NFR BLD: 2.4 % (ref 0–6.9)
EOSINOPHIL NFR BLD: 2.4 % (ref 0–6.9)
EOSINOPHIL NFR BLD: 2.5 % (ref 0–6.9)
EOSINOPHIL NFR BLD: 2.7 % (ref 0–6.9)
EOSINOPHIL NFR BLD: 2.7 % (ref 0–6.9)
EOSINOPHIL NFR BLD: 2.9 % (ref 0–6.9)
EOSINOPHIL NFR BLD: 2.9 % (ref 0–6.9)
EOSINOPHIL NFR BLD: 3.5 % (ref 0–6.9)
ERYTHROCYTE [DISTWIDTH] IN BLOOD BY AUTOMATED COUNT: 46.2 FL (ref 35.9–50)
ERYTHROCYTE [DISTWIDTH] IN BLOOD BY AUTOMATED COUNT: 46.4 FL (ref 35.9–50)
ERYTHROCYTE [DISTWIDTH] IN BLOOD BY AUTOMATED COUNT: 47.2 FL (ref 35.9–50)
ERYTHROCYTE [DISTWIDTH] IN BLOOD BY AUTOMATED COUNT: 47.6 FL (ref 35.9–50)
ERYTHROCYTE [DISTWIDTH] IN BLOOD BY AUTOMATED COUNT: 47.7 FL (ref 35.9–50)
ERYTHROCYTE [DISTWIDTH] IN BLOOD BY AUTOMATED COUNT: 47.7 FL (ref 35.9–50)
ERYTHROCYTE [DISTWIDTH] IN BLOOD BY AUTOMATED COUNT: 48.2 FL (ref 35.9–50)
ERYTHROCYTE [DISTWIDTH] IN BLOOD BY AUTOMATED COUNT: 48.4 FL (ref 35.9–50)
ERYTHROCYTE [DISTWIDTH] IN BLOOD BY AUTOMATED COUNT: 48.8 FL (ref 35.9–50)
ERYTHROCYTE [DISTWIDTH] IN BLOOD BY AUTOMATED COUNT: 49.2 FL (ref 35.9–50)
ERYTHROCYTE [DISTWIDTH] IN BLOOD BY AUTOMATED COUNT: 49.8 FL (ref 35.9–50)
ERYTHROCYTE [DISTWIDTH] IN BLOOD BY AUTOMATED COUNT: 50.1 FL (ref 35.9–50)
ERYTHROCYTE [DISTWIDTH] IN BLOOD BY AUTOMATED COUNT: 50.9 FL (ref 35.9–50)
ERYTHROCYTE [DISTWIDTH] IN BLOOD BY AUTOMATED COUNT: 51.1 FL (ref 35.9–50)
ERYTHROCYTE [DISTWIDTH] IN BLOOD BY AUTOMATED COUNT: 51.7 FL (ref 35.9–50)
ERYTHROCYTE [DISTWIDTH] IN BLOOD BY AUTOMATED COUNT: 51.8 FL (ref 35.9–50)
ERYTHROCYTE [DISTWIDTH] IN BLOOD BY AUTOMATED COUNT: 52.5 FL (ref 35.9–50)
ERYTHROCYTE [DISTWIDTH] IN BLOOD BY AUTOMATED COUNT: 52.7 FL (ref 35.9–50)
ERYTHROCYTE [DISTWIDTH] IN BLOOD BY AUTOMATED COUNT: 53.5 FL (ref 35.9–50)
ERYTHROCYTE [DISTWIDTH] IN BLOOD BY AUTOMATED COUNT: 54.2 FL (ref 35.9–50)
ERYTHROCYTE [DISTWIDTH] IN BLOOD BY AUTOMATED COUNT: 54.9 FL (ref 35.9–50)
ERYTHROCYTE [DISTWIDTH] IN BLOOD BY AUTOMATED COUNT: 56.1 FL (ref 35.9–50)
ERYTHROCYTE [DISTWIDTH] IN BLOOD BY AUTOMATED COUNT: 56.4 FL (ref 35.9–50)
ERYTHROCYTE [DISTWIDTH] IN BLOOD BY AUTOMATED COUNT: 57.2 FL (ref 35.9–50)
ERYTHROCYTE [DISTWIDTH] IN BLOOD BY AUTOMATED COUNT: 57.2 FL (ref 35.9–50)
ERYTHROCYTE [DISTWIDTH] IN BLOOD BY AUTOMATED COUNT: 57.6 FL (ref 35.9–50)
ERYTHROCYTE [DISTWIDTH] IN BLOOD BY AUTOMATED COUNT: 57.7 FL (ref 35.9–50)
ERYTHROCYTE [DISTWIDTH] IN BLOOD BY AUTOMATED COUNT: 61.7 FL (ref 35.9–50)
ERYTHROCYTE [DISTWIDTH] IN BLOOD BY AUTOMATED COUNT: 64.9 FL (ref 35.9–50)
GIANT PLATELETS BLD QL SMEAR: NORMAL
GLOBULIN SER CALC-MCNC: 1.6 G/DL (ref 1.9–3.5)
GLOBULIN SER CALC-MCNC: 2.1 G/DL (ref 1.9–3.5)
GLOBULIN SER CALC-MCNC: 2.2 G/DL (ref 1.9–3.5)
GLOBULIN SER CALC-MCNC: 2.3 G/DL (ref 1.9–3.5)
GLOBULIN SER CALC-MCNC: 2.4 G/DL (ref 1.9–3.5)
GLOBULIN SER CALC-MCNC: 2.5 G/DL (ref 1.9–3.5)
GLOBULIN SER CALC-MCNC: 2.5 G/DL (ref 1.9–3.5)
GLOBULIN SER CALC-MCNC: 2.6 G/DL (ref 1.9–3.5)
GLOBULIN SER CALC-MCNC: 2.7 G/DL (ref 1.9–3.5)
GLOBULIN SER CALC-MCNC: 2.8 G/DL (ref 1.9–3.5)
GLOBULIN SER CALC-MCNC: 2.9 G/DL (ref 1.9–3.5)
GLOBULIN SER CALC-MCNC: 3 G/DL (ref 1.9–3.5)
GLOBULIN SER CALC-MCNC: 3.1 G/DL (ref 1.9–3.5)
GLOBULIN SER CALC-MCNC: 3.2 G/DL (ref 1.9–3.5)
GLOBULIN SER CALC-MCNC: 3.6 G/DL (ref 1.9–3.5)
GLOBULIN SER CALC-MCNC: 3.8 G/DL (ref 1.9–3.5)
GLUCOSE SERPL-MCNC: 100 MG/DL (ref 65–99)
GLUCOSE SERPL-MCNC: 107 MG/DL (ref 65–99)
GLUCOSE SERPL-MCNC: 117 MG/DL (ref 65–99)
GLUCOSE SERPL-MCNC: 122 MG/DL (ref 65–99)
GLUCOSE SERPL-MCNC: 125 MG/DL (ref 65–99)
GLUCOSE SERPL-MCNC: 129 MG/DL (ref 65–99)
GLUCOSE SERPL-MCNC: 157 MG/DL (ref 65–99)
GLUCOSE SERPL-MCNC: 167 MG/DL (ref 65–99)
GLUCOSE SERPL-MCNC: 172 MG/DL (ref 65–99)
GLUCOSE SERPL-MCNC: 176 MG/DL (ref 65–99)
GLUCOSE SERPL-MCNC: 83 MG/DL (ref 65–99)
GLUCOSE SERPL-MCNC: 86 MG/DL (ref 65–99)
GLUCOSE SERPL-MCNC: 87 MG/DL (ref 65–99)
GLUCOSE SERPL-MCNC: 89 MG/DL (ref 65–99)
GLUCOSE SERPL-MCNC: 91 MG/DL (ref 65–99)
GLUCOSE SERPL-MCNC: 93 MG/DL (ref 65–99)
GLUCOSE SERPL-MCNC: 94 MG/DL (ref 65–99)
GLUCOSE SERPL-MCNC: 95 MG/DL (ref 65–99)
GLUCOSE SERPL-MCNC: 95 MG/DL (ref 65–99)
GLUCOSE SERPL-MCNC: 98 MG/DL (ref 65–99)
GLUCOSE SERPL-MCNC: 99 MG/DL (ref 65–99)
GLUCOSE SERPL-MCNC: 99 MG/DL (ref 65–99)
HCO3 BLDA-SCNC: 23 MMOL/L (ref 17–25)
HCT VFR BLD AUTO: 20.1 % (ref 37–47)
HCT VFR BLD AUTO: 20.4 % (ref 37–47)
HCT VFR BLD AUTO: 26 % (ref 37–47)
HCT VFR BLD AUTO: 27.3 % (ref 37–47)
HCT VFR BLD AUTO: 27.9 % (ref 37–47)
HCT VFR BLD AUTO: 28 % (ref 37–47)
HCT VFR BLD AUTO: 32.4 % (ref 37–47)
HCT VFR BLD AUTO: 33.1 % (ref 37–47)
HCT VFR BLD AUTO: 33.5 % (ref 37–47)
HCT VFR BLD AUTO: 33.7 % (ref 37–47)
HCT VFR BLD AUTO: 34 % (ref 37–47)
HCT VFR BLD AUTO: 34.1 % (ref 37–47)
HCT VFR BLD AUTO: 34.8 % (ref 37–47)
HCT VFR BLD AUTO: 34.8 % (ref 37–47)
HCT VFR BLD AUTO: 35 % (ref 37–47)
HCT VFR BLD AUTO: 35.1 % (ref 37–47)
HCT VFR BLD AUTO: 35.3 % (ref 37–47)
HCT VFR BLD AUTO: 35.7 % (ref 37–47)
HCT VFR BLD AUTO: 36.2 % (ref 37–47)
HCT VFR BLD AUTO: 36.3 % (ref 37–47)
HCT VFR BLD AUTO: 36.5 % (ref 37–47)
HCT VFR BLD AUTO: 36.7 % (ref 37–47)
HCT VFR BLD AUTO: 38.3 % (ref 37–47)
HCT VFR BLD AUTO: 38.5 % (ref 37–47)
HCT VFR BLD AUTO: 38.6 % (ref 37–47)
HCT VFR BLD AUTO: 38.6 % (ref 37–47)
HCT VFR BLD AUTO: 39.5 % (ref 37–47)
HCT VFR BLD AUTO: 39.8 % (ref 37–47)
HCT VFR BLD AUTO: 40.5 % (ref 37–47)
HDLC SERPL-MCNC: 72 MG/DL
HDLC SERPL-MCNC: 74 MG/DL
HDLC SERPL-MCNC: 75 MG/DL
HDLC SERPL-MCNC: 77 MG/DL
HDLC SERPL-MCNC: 78 MG/DL
HDLC SERPL-MCNC: 80 MG/DL
HDLC SERPL-MCNC: 81 MG/DL
HDLC SERPL-MCNC: 82 MG/DL
HGB BLD-MCNC: 10 G/DL (ref 12–16)
HGB BLD-MCNC: 10.9 G/DL (ref 12–16)
HGB BLD-MCNC: 11 G/DL (ref 12–16)
HGB BLD-MCNC: 11.1 G/DL (ref 12–16)
HGB BLD-MCNC: 11.2 G/DL (ref 12–16)
HGB BLD-MCNC: 11.2 G/DL (ref 12–16)
HGB BLD-MCNC: 11.5 G/DL (ref 12–16)
HGB BLD-MCNC: 11.5 G/DL (ref 12–16)
HGB BLD-MCNC: 11.7 G/DL (ref 12–16)
HGB BLD-MCNC: 11.9 G/DL (ref 12–16)
HGB BLD-MCNC: 11.9 G/DL (ref 12–16)
HGB BLD-MCNC: 12 G/DL (ref 12–16)
HGB BLD-MCNC: 12 G/DL (ref 12–16)
HGB BLD-MCNC: 12.2 G/DL (ref 12–16)
HGB BLD-MCNC: 12.6 G/DL (ref 12–16)
HGB BLD-MCNC: 12.9 G/DL (ref 12–16)
HGB BLD-MCNC: 12.9 G/DL (ref 12–16)
HGB BLD-MCNC: 13.3 G/DL (ref 12–16)
HGB BLD-MCNC: 13.4 G/DL (ref 12–16)
HGB BLD-MCNC: 13.5 G/DL (ref 12–16)
HGB BLD-MCNC: 13.5 G/DL (ref 12–16)
HGB BLD-MCNC: 6.4 G/DL (ref 12–16)
HGB BLD-MCNC: 6.7 G/DL (ref 12–16)
HGB BLD-MCNC: 8.1 G/DL (ref 12–16)
HGB BLD-MCNC: 8.7 G/DL (ref 12–16)
HGB BLD-MCNC: 8.7 G/DL (ref 12–16)
HGB BLD-MCNC: 9 G/DL (ref 12–16)
IMM GRANULOCYTES # BLD AUTO: 0 K/UL (ref 0–0.11)
IMM GRANULOCYTES # BLD AUTO: 0.01 K/UL (ref 0–0.11)
IMM GRANULOCYTES # BLD AUTO: 0.02 K/UL (ref 0–0.11)
IMM GRANULOCYTES # BLD AUTO: 0.02 K/UL (ref 0–0.11)
IMM GRANULOCYTES # BLD AUTO: 0.2 K/UL (ref 0–0.11)
IMM GRANULOCYTES # BLD AUTO: 0.21 K/UL (ref 0–0.11)
IMM GRANULOCYTES NFR BLD AUTO: 0 % (ref 0–0.9)
IMM GRANULOCYTES NFR BLD AUTO: 0.2 % (ref 0–0.9)
IMM GRANULOCYTES NFR BLD AUTO: 0.3 % (ref 0–0.9)
IMM GRANULOCYTES NFR BLD AUTO: 0.4 % (ref 0–0.9)
IMM GRANULOCYTES NFR BLD AUTO: 0.5 % (ref 0–0.9)
IMM GRANULOCYTES NFR BLD AUTO: 4 % (ref 0–0.9)
IMM GRANULOCYTES NFR BLD AUTO: 4.9 % (ref 0–0.9)
INR PPP: 1.28 (ref 0.87–1.13)
LDH SERPL L TO P-CCNC: 531 U/L (ref 107–266)
LDLC SERPL CALC-MCNC: 127 MG/DL
LDLC SERPL CALC-MCNC: 138 MG/DL
LDLC SERPL CALC-MCNC: 143 MG/DL
LDLC SERPL CALC-MCNC: 146 MG/DL
LDLC SERPL CALC-MCNC: 150 MG/DL
LDLC SERPL CALC-MCNC: 152 MG/DL
LDLC SERPL CALC-MCNC: 155 MG/DL
LDLC SERPL CALC-MCNC: 91 MG/DL
LG PLATELETS BLD QL SMEAR: NORMAL
LIPASE SERPL-CCNC: 673 U/L (ref 11–82)
LV EJECT FRACT  99904: 55
LV EJECT FRACT MOD 2C 99903: 46.49
LV EJECT FRACT MOD 4C 99902: 51.88
LV EJECT FRACT MOD BP 99901: 47.98
LYMPHOCYTES # BLD AUTO: 0.23 K/UL (ref 1–4.8)
LYMPHOCYTES # BLD AUTO: 0.27 K/UL (ref 1–4.8)
LYMPHOCYTES # BLD AUTO: 0.29 K/UL (ref 1–4.8)
LYMPHOCYTES # BLD AUTO: 0.31 K/UL (ref 1–4.8)
LYMPHOCYTES # BLD AUTO: 0.31 K/UL (ref 1–4.8)
LYMPHOCYTES # BLD AUTO: 0.34 K/UL (ref 1–4.8)
LYMPHOCYTES # BLD AUTO: 0.38 K/UL (ref 1–4.8)
LYMPHOCYTES # BLD AUTO: 0.38 K/UL (ref 1–4.8)
LYMPHOCYTES # BLD AUTO: 0.39 K/UL (ref 1–4.8)
LYMPHOCYTES # BLD AUTO: 0.4 K/UL (ref 1–4.8)
LYMPHOCYTES # BLD AUTO: 0.4 K/UL (ref 1–4.8)
LYMPHOCYTES # BLD AUTO: 0.41 K/UL (ref 1–4.8)
LYMPHOCYTES # BLD AUTO: 0.42 K/UL (ref 1–4.8)
LYMPHOCYTES # BLD AUTO: 0.43 K/UL (ref 1–4.8)
LYMPHOCYTES # BLD AUTO: 0.43 K/UL (ref 1–4.8)
LYMPHOCYTES # BLD AUTO: 0.44 K/UL (ref 1–4.8)
LYMPHOCYTES # BLD AUTO: 0.45 K/UL (ref 1–4.8)
LYMPHOCYTES # BLD AUTO: 0.46 K/UL (ref 1–4.8)
LYMPHOCYTES # BLD AUTO: 0.49 K/UL (ref 1–4.8)
LYMPHOCYTES # BLD AUTO: 0.49 K/UL (ref 1–4.8)
LYMPHOCYTES # BLD AUTO: 0.5 K/UL (ref 1–4.8)
LYMPHOCYTES # BLD AUTO: 0.52 K/UL (ref 1–4.8)
LYMPHOCYTES # BLD AUTO: 0.52 K/UL (ref 1–4.8)
LYMPHOCYTES # BLD AUTO: 0.54 K/UL (ref 1–4.8)
LYMPHOCYTES # BLD AUTO: 0.57 K/UL (ref 1–4.8)
LYMPHOCYTES # BLD AUTO: 0.71 K/UL (ref 1–4.8)
LYMPHOCYTES # BLD AUTO: 0.96 K/UL (ref 1–4.8)
LYMPHOCYTES NFR BLD: 10.5 % (ref 22–41)
LYMPHOCYTES NFR BLD: 10.7 % (ref 22–41)
LYMPHOCYTES NFR BLD: 10.8 % (ref 22–41)
LYMPHOCYTES NFR BLD: 11.8 % (ref 22–41)
LYMPHOCYTES NFR BLD: 12.6 % (ref 22–41)
LYMPHOCYTES NFR BLD: 13 % (ref 22–41)
LYMPHOCYTES NFR BLD: 13.2 % (ref 22–41)
LYMPHOCYTES NFR BLD: 13.5 % (ref 22–41)
LYMPHOCYTES NFR BLD: 13.8 % (ref 22–41)
LYMPHOCYTES NFR BLD: 15.2 % (ref 22–41)
LYMPHOCYTES NFR BLD: 15.5 % (ref 22–41)
LYMPHOCYTES NFR BLD: 16 % (ref 22–41)
LYMPHOCYTES NFR BLD: 16 % (ref 22–41)
LYMPHOCYTES NFR BLD: 16.2 % (ref 22–41)
LYMPHOCYTES NFR BLD: 16.7 % (ref 22–41)
LYMPHOCYTES NFR BLD: 16.7 % (ref 22–41)
LYMPHOCYTES NFR BLD: 17 % (ref 22–41)
LYMPHOCYTES NFR BLD: 17.4 % (ref 22–41)
LYMPHOCYTES NFR BLD: 17.8 % (ref 22–41)
LYMPHOCYTES NFR BLD: 18.6 % (ref 22–41)
LYMPHOCYTES NFR BLD: 18.9 % (ref 22–41)
LYMPHOCYTES NFR BLD: 19.3 % (ref 22–41)
LYMPHOCYTES NFR BLD: 19.6 % (ref 22–41)
LYMPHOCYTES NFR BLD: 23.4 % (ref 22–41)
LYMPHOCYTES NFR BLD: 27.8 % (ref 22–41)
LYMPHOCYTES NFR BLD: 3.6 % (ref 22–41)
LYMPHOCYTES NFR BLD: 5.3 % (ref 22–41)
LYMPHOCYTES NFR BLD: 5.5 % (ref 22–41)
LYMPHOCYTES NFR BLD: 9.3 % (ref 22–41)
MAGNESIUM SERPL-MCNC: 1.6 MG/DL (ref 1.5–2.5)
MAGNESIUM SERPL-MCNC: 1.7 MG/DL (ref 1.5–2.5)
MAGNESIUM SERPL-MCNC: 1.8 MG/DL (ref 1.5–2.5)
MAGNESIUM SERPL-MCNC: 1.9 MG/DL (ref 1.5–2.5)
MAGNESIUM SERPL-MCNC: 2 MG/DL (ref 1.5–2.5)
MAGNESIUM SERPL-MCNC: 2.1 MG/DL (ref 1.5–2.5)
MAGNESIUM SERPL-MCNC: 2.1 MG/DL (ref 1.5–2.5)
MAGNESIUM SERPL-MCNC: 2.2 MG/DL (ref 1.5–2.5)
MANUAL DIFF BLD: NORMAL
MCH RBC QN AUTO: 27.8 PG (ref 27–33)
MCH RBC QN AUTO: 27.9 PG (ref 27–33)
MCH RBC QN AUTO: 28.1 PG (ref 27–33)
MCH RBC QN AUTO: 28.2 PG (ref 27–33)
MCH RBC QN AUTO: 28.3 PG (ref 27–33)
MCH RBC QN AUTO: 28.4 PG (ref 27–33)
MCH RBC QN AUTO: 28.5 PG (ref 27–33)
MCH RBC QN AUTO: 29 PG (ref 27–33)
MCH RBC QN AUTO: 29 PG (ref 27–33)
MCH RBC QN AUTO: 29.5 PG (ref 27–33)
MCH RBC QN AUTO: 29.9 PG (ref 27–33)
MCH RBC QN AUTO: 30.4 PG (ref 27–33)
MCH RBC QN AUTO: 31.1 PG (ref 27–33)
MCH RBC QN AUTO: 31.2 PG (ref 27–33)
MCH RBC QN AUTO: 31.2 PG (ref 27–33)
MCH RBC QN AUTO: 31.4 PG (ref 27–33)
MCH RBC QN AUTO: 31.7 PG (ref 27–33)
MCH RBC QN AUTO: 33 PG (ref 27–33)
MCH RBC QN AUTO: 33 PG (ref 27–33)
MCH RBC QN AUTO: 33.2 PG (ref 27–33)
MCH RBC QN AUTO: 33.3 PG (ref 27–33)
MCH RBC QN AUTO: 33.6 PG (ref 27–33)
MCH RBC QN AUTO: 33.7 PG (ref 27–33)
MCH RBC QN AUTO: 34.1 PG (ref 27–33)
MCH RBC QN AUTO: 34.1 PG (ref 27–33)
MCH RBC QN AUTO: 34.8 PG (ref 27–33)
MCH RBC QN AUTO: 35.1 PG (ref 27–33)
MCH RBC QN AUTO: 35.6 PG (ref 27–33)
MCH RBC QN AUTO: 38 PG (ref 27–33)
MCHC RBC AUTO-ENTMCNC: 30.9 G/DL (ref 33.6–35)
MCHC RBC AUTO-ENTMCNC: 31.2 G/DL (ref 33.6–35)
MCHC RBC AUTO-ENTMCNC: 31.2 G/DL (ref 33.6–35)
MCHC RBC AUTO-ENTMCNC: 31.4 G/DL (ref 33.6–35)
MCHC RBC AUTO-ENTMCNC: 31.6 G/DL (ref 33.6–35)
MCHC RBC AUTO-ENTMCNC: 31.7 G/DL (ref 33.6–35)
MCHC RBC AUTO-ENTMCNC: 31.8 G/DL (ref 33.6–35)
MCHC RBC AUTO-ENTMCNC: 31.9 G/DL (ref 33.6–35)
MCHC RBC AUTO-ENTMCNC: 32 G/DL (ref 33.6–35)
MCHC RBC AUTO-ENTMCNC: 32.1 G/DL (ref 33.6–35)
MCHC RBC AUTO-ENTMCNC: 32.4 G/DL (ref 33.6–35)
MCHC RBC AUTO-ENTMCNC: 32.7 G/DL (ref 33.6–35)
MCHC RBC AUTO-ENTMCNC: 32.7 G/DL (ref 33.6–35)
MCHC RBC AUTO-ENTMCNC: 32.8 G/DL (ref 33.6–35)
MCHC RBC AUTO-ENTMCNC: 32.8 G/DL (ref 33.6–35)
MCHC RBC AUTO-ENTMCNC: 32.9 G/DL (ref 33.6–35)
MCHC RBC AUTO-ENTMCNC: 32.9 G/DL (ref 33.6–35)
MCHC RBC AUTO-ENTMCNC: 33.1 G/DL (ref 33.6–35)
MCHC RBC AUTO-ENTMCNC: 33.3 G/DL (ref 33.6–35)
MCHC RBC AUTO-ENTMCNC: 33.3 G/DL (ref 33.6–35)
MCHC RBC AUTO-ENTMCNC: 33.4 G/DL (ref 33.6–35)
MCHC RBC AUTO-ENTMCNC: 33.6 G/DL (ref 33.6–35)
MCHC RBC AUTO-ENTMCNC: 33.7 G/DL (ref 33.6–35)
MCHC RBC AUTO-ENTMCNC: 33.9 G/DL (ref 33.6–35)
MCHC RBC AUTO-ENTMCNC: 33.9 G/DL (ref 33.6–35)
MCHC RBC AUTO-ENTMCNC: 34.2 G/DL (ref 33.6–35)
MCHC RBC AUTO-ENTMCNC: 35 G/DL (ref 33.6–35)
MCV RBC AUTO: 100.8 FL (ref 81.4–97.8)
MCV RBC AUTO: 101.9 FL (ref 81.4–97.8)
MCV RBC AUTO: 102.1 FL (ref 81.4–97.8)
MCV RBC AUTO: 102.1 FL (ref 81.4–97.8)
MCV RBC AUTO: 102.6 FL (ref 81.4–97.8)
MCV RBC AUTO: 104.2 FL (ref 81.4–97.8)
MCV RBC AUTO: 106.9 FL (ref 81.4–97.8)
MCV RBC AUTO: 108.5 FL (ref 81.4–97.8)
MCV RBC AUTO: 87 FL (ref 81.4–97.8)
MCV RBC AUTO: 88.6 FL (ref 81.4–97.8)
MCV RBC AUTO: 88.7 FL (ref 81.4–97.8)
MCV RBC AUTO: 88.9 FL (ref 81.4–97.8)
MCV RBC AUTO: 89.2 FL (ref 81.4–97.8)
MCV RBC AUTO: 89.4 FL (ref 81.4–97.8)
MCV RBC AUTO: 90.7 FL (ref 81.4–97.8)
MCV RBC AUTO: 91 FL (ref 81.4–97.8)
MCV RBC AUTO: 91.2 FL (ref 81.4–97.8)
MCV RBC AUTO: 92.9 FL (ref 81.4–97.8)
MCV RBC AUTO: 93.6 FL (ref 81.4–97.8)
MCV RBC AUTO: 93.9 FL (ref 81.4–97.8)
MCV RBC AUTO: 94.3 FL (ref 81.4–97.8)
MCV RBC AUTO: 94.6 FL (ref 81.4–97.8)
MCV RBC AUTO: 94.7 FL (ref 81.4–97.8)
MCV RBC AUTO: 94.9 FL (ref 81.4–97.8)
MCV RBC AUTO: 96.1 FL (ref 81.4–97.8)
MCV RBC AUTO: 98.9 FL (ref 81.4–97.8)
MCV RBC AUTO: 98.9 FL (ref 81.4–97.8)
MCV RBC AUTO: 99.7 FL (ref 81.4–97.8)
MCV RBC AUTO: 99.7 FL (ref 81.4–97.8)
METAMYELOCYTES NFR BLD MANUAL: 1.8 %
METAMYELOCYTES NFR BLD MANUAL: 3.6 %
MICROCYTES BLD QL SMEAR: ABNORMAL
MONOCYTES # BLD AUTO: 0 K/UL (ref 0–0.85)
MONOCYTES # BLD AUTO: 0.15 K/UL (ref 0–0.85)
MONOCYTES # BLD AUTO: 0.21 K/UL (ref 0–0.85)
MONOCYTES # BLD AUTO: 0.22 K/UL (ref 0–0.85)
MONOCYTES # BLD AUTO: 0.25 K/UL (ref 0–0.85)
MONOCYTES # BLD AUTO: 0.26 K/UL (ref 0–0.85)
MONOCYTES # BLD AUTO: 0.26 K/UL (ref 0–0.85)
MONOCYTES # BLD AUTO: 0.27 K/UL (ref 0–0.85)
MONOCYTES # BLD AUTO: 0.3 K/UL (ref 0–0.85)
MONOCYTES # BLD AUTO: 0.3 K/UL (ref 0–0.85)
MONOCYTES # BLD AUTO: 0.32 K/UL (ref 0–0.85)
MONOCYTES # BLD AUTO: 0.34 K/UL (ref 0–0.85)
MONOCYTES # BLD AUTO: 0.35 K/UL (ref 0–0.85)
MONOCYTES # BLD AUTO: 0.35 K/UL (ref 0–0.85)
MONOCYTES # BLD AUTO: 0.36 K/UL (ref 0–0.85)
MONOCYTES # BLD AUTO: 0.39 K/UL (ref 0–0.85)
MONOCYTES # BLD AUTO: 0.42 K/UL (ref 0–0.85)
MONOCYTES # BLD AUTO: 0.44 K/UL (ref 0–0.85)
MONOCYTES # BLD AUTO: 0.46 K/UL (ref 0–0.85)
MONOCYTES # BLD AUTO: 0.46 K/UL (ref 0–0.85)
MONOCYTES # BLD AUTO: 0.48 K/UL (ref 0–0.85)
MONOCYTES # BLD AUTO: 0.53 K/UL (ref 0–0.85)
MONOCYTES # BLD AUTO: 0.54 K/UL (ref 0–0.85)
MONOCYTES # BLD AUTO: 0.68 K/UL (ref 0–0.85)
MONOCYTES NFR BLD AUTO: 0 % (ref 0–13.4)
MONOCYTES NFR BLD AUTO: 10.3 % (ref 0–13.4)
MONOCYTES NFR BLD AUTO: 10.4 % (ref 0–13.4)
MONOCYTES NFR BLD AUTO: 10.7 % (ref 0–13.4)
MONOCYTES NFR BLD AUTO: 11.1 % (ref 0–13.4)
MONOCYTES NFR BLD AUTO: 11.2 % (ref 0–13.4)
MONOCYTES NFR BLD AUTO: 11.6 % (ref 0–13.4)
MONOCYTES NFR BLD AUTO: 12.3 % (ref 0–13.4)
MONOCYTES NFR BLD AUTO: 12.3 % (ref 0–13.4)
MONOCYTES NFR BLD AUTO: 12.7 % (ref 0–13.4)
MONOCYTES NFR BLD AUTO: 13 % (ref 0–13.4)
MONOCYTES NFR BLD AUTO: 13.1 % (ref 0–13.4)
MONOCYTES NFR BLD AUTO: 13.3 % (ref 0–13.4)
MONOCYTES NFR BLD AUTO: 13.4 % (ref 0–13.4)
MONOCYTES NFR BLD AUTO: 13.4 % (ref 0–13.4)
MONOCYTES NFR BLD AUTO: 13.6 % (ref 0–13.4)
MONOCYTES NFR BLD AUTO: 13.9 % (ref 0–13.4)
MONOCYTES NFR BLD AUTO: 14.1 % (ref 0–13.4)
MONOCYTES NFR BLD AUTO: 14.3 % (ref 0–13.4)
MONOCYTES NFR BLD AUTO: 14.4 % (ref 0–13.4)
MONOCYTES NFR BLD AUTO: 15.2 % (ref 0–13.4)
MONOCYTES NFR BLD AUTO: 15.4 % (ref 0–13.4)
MONOCYTES NFR BLD AUTO: 15.5 % (ref 0–13.4)
MONOCYTES NFR BLD AUTO: 3.6 % (ref 0–13.4)
MONOCYTES NFR BLD AUTO: 5.4 % (ref 0–13.4)
MONOCYTES NFR BLD AUTO: 7.4 % (ref 0–13.4)
MONOCYTES NFR BLD AUTO: 8.1 % (ref 0–13.4)
MONOCYTES NFR BLD AUTO: 9.2 % (ref 0–13.4)
MONOCYTES NFR BLD AUTO: 9.8 % (ref 0–13.4)
MORPHOLOGY BLD-IMP: NORMAL
MYELOCYTES NFR BLD MANUAL: 0.9 %
MYELOCYTES NFR BLD MANUAL: 2.7 %
MYELOCYTES NFR BLD MANUAL: 2.7 %
NEUTROPHILS # BLD AUTO: 1.01 K/UL (ref 2–7.15)
NEUTROPHILS # BLD AUTO: 1.09 K/UL (ref 2–7.15)
NEUTROPHILS # BLD AUTO: 1.37 K/UL (ref 2–7.15)
NEUTROPHILS # BLD AUTO: 1.46 K/UL (ref 2–7.15)
NEUTROPHILS # BLD AUTO: 1.48 K/UL (ref 2–7.15)
NEUTROPHILS # BLD AUTO: 1.49 K/UL (ref 2–7.15)
NEUTROPHILS # BLD AUTO: 1.52 K/UL (ref 2–7.15)
NEUTROPHILS # BLD AUTO: 1.59 K/UL (ref 2–7.15)
NEUTROPHILS # BLD AUTO: 1.64 K/UL (ref 2–7.15)
NEUTROPHILS # BLD AUTO: 1.7 K/UL (ref 2–7.15)
NEUTROPHILS # BLD AUTO: 1.81 K/UL (ref 2–7.15)
NEUTROPHILS # BLD AUTO: 1.83 K/UL (ref 2–7.15)
NEUTROPHILS # BLD AUTO: 1.84 K/UL (ref 2–7.15)
NEUTROPHILS # BLD AUTO: 1.86 K/UL (ref 2–7.15)
NEUTROPHILS # BLD AUTO: 1.9 K/UL (ref 2–7.15)
NEUTROPHILS # BLD AUTO: 2.17 K/UL (ref 2–7.15)
NEUTROPHILS # BLD AUTO: 2.38 K/UL (ref 2–7.15)
NEUTROPHILS # BLD AUTO: 2.7 K/UL (ref 2–7.15)
NEUTROPHILS # BLD AUTO: 2.98 K/UL (ref 2–7.15)
NEUTROPHILS # BLD AUTO: 2.98 K/UL (ref 2–7.15)
NEUTROPHILS # BLD AUTO: 3.06 K/UL (ref 2–7.15)
NEUTROPHILS # BLD AUTO: 3.08 K/UL (ref 2–7.15)
NEUTROPHILS # BLD AUTO: 3.86 K/UL (ref 2–7.15)
NEUTROPHILS # BLD AUTO: 4.34 K/UL (ref 2–7.15)
NEUTROPHILS # BLD AUTO: 4.83 K/UL (ref 2–7.15)
NEUTROPHILS # BLD AUTO: 5.39 K/UL (ref 2–7.15)
NEUTROPHILS # BLD AUTO: 5.96 K/UL (ref 2–7.15)
NEUTROPHILS NFR BLD: 54 % (ref 44–72)
NEUTROPHILS NFR BLD: 59.2 % (ref 44–72)
NEUTROPHILS NFR BLD: 62.9 % (ref 44–72)
NEUTROPHILS NFR BLD: 63.5 % (ref 44–72)
NEUTROPHILS NFR BLD: 63.8 % (ref 44–72)
NEUTROPHILS NFR BLD: 63.9 % (ref 44–72)
NEUTROPHILS NFR BLD: 64 % (ref 44–72)
NEUTROPHILS NFR BLD: 64.8 % (ref 44–72)
NEUTROPHILS NFR BLD: 65 % (ref 44–72)
NEUTROPHILS NFR BLD: 66.6 % (ref 44–72)
NEUTROPHILS NFR BLD: 67 % (ref 44–72)
NEUTROPHILS NFR BLD: 68.4 % (ref 44–72)
NEUTROPHILS NFR BLD: 68.5 % (ref 44–72)
NEUTROPHILS NFR BLD: 69.1 % (ref 44–72)
NEUTROPHILS NFR BLD: 69.3 % (ref 44–72)
NEUTROPHILS NFR BLD: 70 % (ref 44–72)
NEUTROPHILS NFR BLD: 70.2 % (ref 44–72)
NEUTROPHILS NFR BLD: 71.9 % (ref 44–72)
NEUTROPHILS NFR BLD: 72 % (ref 44–72)
NEUTROPHILS NFR BLD: 72.1 % (ref 44–72)
NEUTROPHILS NFR BLD: 73 % (ref 44–72)
NEUTROPHILS NFR BLD: 74.3 % (ref 44–72)
NEUTROPHILS NFR BLD: 74.4 % (ref 44–72)
NEUTROPHILS NFR BLD: 74.7 % (ref 44–72)
NEUTROPHILS NFR BLD: 76.8 % (ref 44–72)
NEUTROPHILS NFR BLD: 77.5 % (ref 44–72)
NEUTROPHILS NFR BLD: 79.3 % (ref 44–72)
NEUTROPHILS NFR BLD: 82.5 % (ref 44–72)
NEUTROPHILS NFR BLD: 93.8 % (ref 44–72)
NEUTS BAND NFR BLD MANUAL: 0.9 % (ref 0–10)
NEUTS BAND NFR BLD MANUAL: 0.9 % (ref 0–10)
NEUTS BAND NFR BLD MANUAL: 3.6 % (ref 0–10)
NEUTS BAND NFR BLD MANUAL: 3.6 % (ref 0–10)
NEUTS BAND NFR BLD MANUAL: 4 % (ref 0–10)
NRBC # BLD AUTO: 0 K/UL
NRBC # BLD AUTO: 0.02 K/UL
NRBC # BLD AUTO: 0.03 K/UL
NRBC # BLD AUTO: 0.03 K/UL
NRBC # BLD AUTO: 0.05 K/UL
NRBC # BLD AUTO: 0.06 K/UL
NRBC # BLD AUTO: 0.11 K/UL
NRBC # BLD AUTO: 0.16 K/UL
NRBC BLD-RTO: 0 /100 WBC
NRBC BLD-RTO: 0.6 /100 WBC
NRBC BLD-RTO: 0.7 /100 WBC
NRBC BLD-RTO: 0.8 /100 WBC
NRBC BLD-RTO: 1.1 /100 WBC
NRBC BLD-RTO: 1.2 /100 WBC
NRBC BLD-RTO: 2.1 /100 WBC
NRBC BLD-RTO: 4.7 /100 WBC
OVALOCYTES BLD QL SMEAR: NORMAL
OVALOCYTES BLD QL SMEAR: NORMAL
PCO2 BLDA: 38.4 MMHG (ref 26–37)
PH BLDA: 7.39 [PH] (ref 7.4–7.5)
PHOSPHATE SERPL-MCNC: 2.3 MG/DL (ref 2.5–4.5)
PHOSPHATE SERPL-MCNC: 2.9 MG/DL (ref 2.5–4.5)
PHOSPHATE SERPL-MCNC: 2.9 MG/DL (ref 2.5–4.5)
PHOSPHATE SERPL-MCNC: 3 MG/DL (ref 2.5–4.5)
PHOSPHATE SERPL-MCNC: 3.1 MG/DL (ref 2.5–4.5)
PHOSPHATE SERPL-MCNC: 3.2 MG/DL (ref 2.5–4.5)
PHOSPHATE SERPL-MCNC: 3.3 MG/DL (ref 2.5–4.5)
PHOSPHATE SERPL-MCNC: 3.5 MG/DL (ref 2.5–4.5)
PHOSPHATE SERPL-MCNC: 3.7 MG/DL (ref 2.5–4.5)
PHOSPHATE SERPL-MCNC: 3.7 MG/DL (ref 2.5–4.5)
PHOSPHATE SERPL-MCNC: 4.1 MG/DL (ref 2.5–4.5)
PHOSPHATE SERPL-MCNC: 4.2 MG/DL (ref 2.5–4.5)
PLATELET # BLD AUTO: 128 K/UL (ref 164–446)
PLATELET # BLD AUTO: 132 K/UL (ref 164–446)
PLATELET # BLD AUTO: 139 K/UL (ref 164–446)
PLATELET # BLD AUTO: 148 K/UL (ref 164–446)
PLATELET # BLD AUTO: 165 K/UL (ref 164–446)
PLATELET # BLD AUTO: 165 K/UL (ref 164–446)
PLATELET # BLD AUTO: 172 K/UL (ref 164–446)
PLATELET # BLD AUTO: 183 K/UL (ref 164–446)
PLATELET # BLD AUTO: 184 K/UL (ref 164–446)
PLATELET # BLD AUTO: 188 K/UL (ref 164–446)
PLATELET # BLD AUTO: 195 K/UL (ref 164–446)
PLATELET # BLD AUTO: 200 K/UL (ref 164–446)
PLATELET # BLD AUTO: 205 K/UL (ref 164–446)
PLATELET # BLD AUTO: 207 K/UL (ref 164–446)
PLATELET # BLD AUTO: 208 K/UL (ref 164–446)
PLATELET # BLD AUTO: 209 K/UL (ref 164–446)
PLATELET # BLD AUTO: 211 K/UL (ref 164–446)
PLATELET # BLD AUTO: 212 K/UL (ref 164–446)
PLATELET # BLD AUTO: 213 K/UL (ref 164–446)
PLATELET # BLD AUTO: 225 K/UL (ref 164–446)
PLATELET # BLD AUTO: 227 K/UL (ref 164–446)
PLATELET # BLD AUTO: 242 K/UL (ref 164–446)
PLATELET # BLD AUTO: 252 K/UL (ref 164–446)
PLATELET # BLD AUTO: 256 K/UL (ref 164–446)
PLATELET # BLD AUTO: 266 K/UL (ref 164–446)
PLATELET # BLD AUTO: 297 K/UL (ref 164–446)
PLATELET # BLD AUTO: 95 K/UL (ref 164–446)
PLATELET BLD QL SMEAR: NORMAL
PMV BLD AUTO: 10 FL (ref 9–12.9)
PMV BLD AUTO: 10 FL (ref 9–12.9)
PMV BLD AUTO: 10.1 FL (ref 9–12.9)
PMV BLD AUTO: 10.2 FL (ref 9–12.9)
PMV BLD AUTO: 10.3 FL (ref 9–12.9)
PMV BLD AUTO: 10.5 FL (ref 9–12.9)
PMV BLD AUTO: 10.9 FL (ref 9–12.9)
PMV BLD AUTO: 11 FL (ref 9–12.9)
PMV BLD AUTO: 9.3 FL (ref 9–12.9)
PMV BLD AUTO: 9.4 FL (ref 9–12.9)
PMV BLD AUTO: 9.5 FL (ref 9–12.9)
PMV BLD AUTO: 9.5 FL (ref 9–12.9)
PMV BLD AUTO: 9.7 FL (ref 9–12.9)
PMV BLD AUTO: 9.8 FL (ref 9–12.9)
PMV BLD AUTO: 9.9 FL (ref 9–12.9)
PO2 BLDA: 28.3 MMHG (ref 64–87)
POIKILOCYTOSIS BLD QL SMEAR: NORMAL
POIKILOCYTOSIS BLD QL SMEAR: NORMAL
POLYCHROMASIA BLD QL SMEAR: NORMAL
POTASSIUM SERPL-SCNC: 3.5 MMOL/L (ref 3.6–5.5)
POTASSIUM SERPL-SCNC: 3.6 MMOL/L (ref 3.6–5.5)
POTASSIUM SERPL-SCNC: 3.6 MMOL/L (ref 3.6–5.5)
POTASSIUM SERPL-SCNC: 3.8 MMOL/L (ref 3.6–5.5)
POTASSIUM SERPL-SCNC: 3.9 MMOL/L (ref 3.6–5.5)
POTASSIUM SERPL-SCNC: 4 MMOL/L (ref 3.6–5.5)
POTASSIUM SERPL-SCNC: 4.1 MMOL/L (ref 3.6–5.5)
POTASSIUM SERPL-SCNC: 4.2 MMOL/L (ref 3.6–5.5)
POTASSIUM SERPL-SCNC: 4.2 MMOL/L (ref 3.6–5.5)
POTASSIUM SERPL-SCNC: 4.3 MMOL/L (ref 3.6–5.5)
POTASSIUM SERPL-SCNC: 4.4 MMOL/L (ref 3.6–5.5)
POTASSIUM SERPL-SCNC: 4.5 MMOL/L (ref 3.6–5.5)
PRODUCT TYPE UPROD: NORMAL
PROT SERPL-MCNC: 5.9 G/DL (ref 6–8.2)
PROT SERPL-MCNC: 6.4 G/DL (ref 6–8.2)
PROT SERPL-MCNC: 6.4 G/DL (ref 6–8.2)
PROT SERPL-MCNC: 6.5 G/DL (ref 6–8.2)
PROT SERPL-MCNC: 6.6 G/DL (ref 6–8.2)
PROT SERPL-MCNC: 6.8 G/DL (ref 6–8.2)
PROT SERPL-MCNC: 6.9 G/DL (ref 6–8.2)
PROT SERPL-MCNC: 7 G/DL (ref 6–8.2)
PROT SERPL-MCNC: 7.2 G/DL (ref 6–8.2)
PROT SERPL-MCNC: 7.2 G/DL (ref 6–8.2)
PROT SERPL-MCNC: 7.4 G/DL (ref 6–8.2)
PROT SERPL-MCNC: 7.5 G/DL (ref 6–8.2)
PROT SERPL-MCNC: 7.7 G/DL (ref 6–8.2)
PROTHROMBIN TIME: 16.1 SEC (ref 12–14.6)
RBC # BLD AUTO: 2.3 M/UL (ref 4.2–5.4)
RBC # BLD AUTO: 2.31 M/UL (ref 4.2–5.4)
RBC # BLD AUTO: 2.85 M/UL (ref 4.2–5.4)
RBC # BLD AUTO: 3.07 M/UL (ref 4.2–5.4)
RBC # BLD AUTO: 3.12 M/UL (ref 4.2–5.4)
RBC # BLD AUTO: 3.16 M/UL (ref 4.2–5.4)
RBC # BLD AUTO: 3.42 M/UL (ref 4.2–5.4)
RBC # BLD AUTO: 3.5 M/UL (ref 4.2–5.4)
RBC # BLD AUTO: 3.52 M/UL (ref 4.2–5.4)
RBC # BLD AUTO: 3.53 M/UL (ref 4.2–5.4)
RBC # BLD AUTO: 3.53 M/UL (ref 4.2–5.4)
RBC # BLD AUTO: 3.56 M/UL (ref 4.2–5.4)
RBC # BLD AUTO: 3.56 M/UL (ref 4.2–5.4)
RBC # BLD AUTO: 3.61 M/UL (ref 4.2–5.4)
RBC # BLD AUTO: 3.62 M/UL (ref 4.2–5.4)
RBC # BLD AUTO: 3.62 M/UL (ref 4.2–5.4)
RBC # BLD AUTO: 3.64 M/UL (ref 4.2–5.4)
RBC # BLD AUTO: 3.75 M/UL (ref 4.2–5.4)
RBC # BLD AUTO: 3.78 M/UL (ref 4.2–5.4)
RBC # BLD AUTO: 3.79 M/UL (ref 4.2–5.4)
RBC # BLD AUTO: 3.79 M/UL (ref 4.2–5.4)
RBC # BLD AUTO: 3.8 M/UL (ref 4.2–5.4)
RBC # BLD AUTO: 3.82 M/UL (ref 4.2–5.4)
RBC # BLD AUTO: 3.84 M/UL (ref 4.2–5.4)
RBC # BLD AUTO: 3.86 M/UL (ref 4.2–5.4)
RBC # BLD AUTO: 3.88 M/UL (ref 4.2–5.4)
RBC # BLD AUTO: 3.9 M/UL (ref 4.2–5.4)
RBC BLD AUTO: PRESENT
RH BLD: NORMAL
RH BLD: NORMAL
SAO2 % BLDA: 43 % (ref 93–99)
SODIUM SERPL-SCNC: 134 MMOL/L (ref 135–145)
SODIUM SERPL-SCNC: 136 MMOL/L (ref 135–145)
SODIUM SERPL-SCNC: 137 MMOL/L (ref 135–145)
SODIUM SERPL-SCNC: 137 MMOL/L (ref 135–145)
SODIUM SERPL-SCNC: 138 MMOL/L (ref 135–145)
SODIUM SERPL-SCNC: 138 MMOL/L (ref 135–145)
SODIUM SERPL-SCNC: 139 MMOL/L (ref 135–145)
SODIUM SERPL-SCNC: 140 MMOL/L (ref 135–145)
SODIUM SERPL-SCNC: 140 MMOL/L (ref 135–145)
SODIUM SERPL-SCNC: 141 MMOL/L (ref 135–145)
SODIUM SERPL-SCNC: 142 MMOL/L (ref 135–145)
SODIUM SERPL-SCNC: 143 MMOL/L (ref 135–145)
SODIUM SERPL-SCNC: 144 MMOL/L (ref 135–145)
TRIGL SERPL-MCNC: 100 MG/DL (ref 0–149)
TRIGL SERPL-MCNC: 103 MG/DL (ref 0–149)
TRIGL SERPL-MCNC: 103 MG/DL (ref 0–149)
TRIGL SERPL-MCNC: 112 MG/DL (ref 0–149)
TRIGL SERPL-MCNC: 62 MG/DL (ref 0–149)
TRIGL SERPL-MCNC: 83 MG/DL (ref 0–149)
TRIGL SERPL-MCNC: 94 MG/DL (ref 0–149)
TRIGL SERPL-MCNC: 98 MG/DL (ref 0–149)
TROPONIN I SERPL-MCNC: 0.01 NG/ML (ref 0–0.04)
TROPONIN I SERPL-MCNC: <0.01 NG/ML (ref 0–0.04)
UNIT STATUS USTAT: NORMAL
URATE SERPL-MCNC: 5.7 MG/DL (ref 1.9–8.2)
WBC # BLD AUTO: 1.9 K/UL (ref 4.8–10.8)
WBC # BLD AUTO: 2.2 K/UL (ref 4.8–10.8)
WBC # BLD AUTO: 2.3 K/UL (ref 4.8–10.8)
WBC # BLD AUTO: 2.4 K/UL (ref 4.8–10.8)
WBC # BLD AUTO: 2.6 K/UL (ref 4.8–10.8)
WBC # BLD AUTO: 2.7 K/UL (ref 4.8–10.8)
WBC # BLD AUTO: 2.7 K/UL (ref 4.8–10.8)
WBC # BLD AUTO: 2.8 K/UL (ref 4.8–10.8)
WBC # BLD AUTO: 2.9 K/UL (ref 4.8–10.8)
WBC # BLD AUTO: 2.9 K/UL (ref 4.8–10.8)
WBC # BLD AUTO: 3 K/UL (ref 4.8–10.8)
WBC # BLD AUTO: 3.1 K/UL (ref 4.8–10.8)
WBC # BLD AUTO: 3.2 K/UL (ref 4.8–10.8)
WBC # BLD AUTO: 3.4 K/UL (ref 4.8–10.8)
WBC # BLD AUTO: 3.7 K/UL (ref 4.8–10.8)
WBC # BLD AUTO: 4 K/UL (ref 4.8–10.8)
WBC # BLD AUTO: 4.1 K/UL (ref 4.8–10.8)
WBC # BLD AUTO: 5.1 K/UL (ref 4.8–10.8)
WBC # BLD AUTO: 5.3 K/UL (ref 4.8–10.8)
WBC # BLD AUTO: 5.4 K/UL (ref 4.8–10.8)
WBC # BLD AUTO: 6.5 K/UL (ref 4.8–10.8)
WBC # BLD AUTO: 7.6 K/UL (ref 4.8–10.8)

## 2018-01-01 PROCEDURE — 73502 X-RAY EXAM HIP UNI 2-3 VIEWS: CPT | Mod: LT

## 2018-01-01 PROCEDURE — 77295 3-D RADIOTHERAPY PLAN: CPT | Mod: 26 | Performed by: RADIOLOGY

## 2018-01-01 PROCEDURE — 85025 COMPLETE CBC W/AUTO DIFF WBC: CPT

## 2018-01-01 PROCEDURE — 86923 COMPATIBILITY TEST ELECTRIC: CPT

## 2018-01-01 PROCEDURE — 700111 HCHG RX REV CODE 636 W/ 250 OVERRIDE (IP)

## 2018-01-01 PROCEDURE — 84100 ASSAY OF PHOSPHORUS: CPT

## 2018-01-01 PROCEDURE — 86901 BLOOD TYPING SEROLOGIC RH(D): CPT

## 2018-01-01 PROCEDURE — 36430 TRANSFUSION BLD/BLD COMPNT: CPT

## 2018-01-01 PROCEDURE — 71260 CT THORAX DX C+: CPT

## 2018-01-01 PROCEDURE — 99215 OFFICE O/P EST HI 40 MIN: CPT | Performed by: INTERNAL MEDICINE

## 2018-01-01 PROCEDURE — 36415 COLL VENOUS BLD VENIPUNCTURE: CPT

## 2018-01-01 PROCEDURE — 77431 RADIATION THERAPY MANAGEMENT: CPT | Performed by: RADIOLOGY

## 2018-01-01 PROCEDURE — 700102 HCHG RX REV CODE 250 W/ 637 OVERRIDE(OP): Performed by: HOSPITALIST

## 2018-01-01 PROCEDURE — 83735 ASSAY OF MAGNESIUM: CPT

## 2018-01-01 PROCEDURE — 86300 IMMUNOASSAY TUMOR CA 15-3: CPT

## 2018-01-01 PROCEDURE — 700102 HCHG RX REV CODE 250 W/ 637 OVERRIDE(OP): Performed by: INTERNAL MEDICINE

## 2018-01-01 PROCEDURE — 96375 TX/PRO/DX INJ NEW DRUG ADDON: CPT

## 2018-01-01 PROCEDURE — A9270 NON-COVERED ITEM OR SERVICE: HCPCS | Performed by: INTERNAL MEDICINE

## 2018-01-01 PROCEDURE — 84484 ASSAY OF TROPONIN QUANT: CPT

## 2018-01-01 PROCEDURE — 80053 COMPREHEN METABOLIC PANEL: CPT

## 2018-01-01 PROCEDURE — 700101 HCHG RX REV CODE 250

## 2018-01-01 PROCEDURE — 99212 OFFICE O/P EST SF 10 MIN: CPT | Performed by: RADIOLOGY

## 2018-01-01 PROCEDURE — 99233 SBSQ HOSP IP/OBS HIGH 50: CPT | Performed by: FAMILY MEDICINE

## 2018-01-01 PROCEDURE — 77334 RADIATION TREATMENT AID(S): CPT | Mod: 26 | Performed by: RADIOLOGY

## 2018-01-01 PROCEDURE — 93005 ELECTROCARDIOGRAM TRACING: CPT | Performed by: FAMILY MEDICINE

## 2018-01-01 PROCEDURE — 71275 CT ANGIOGRAPHY CHEST: CPT

## 2018-01-01 PROCEDURE — 96376 TX/PRO/DX INJ SAME DRUG ADON: CPT

## 2018-01-01 PROCEDURE — 99214 OFFICE O/P EST MOD 30 MIN: CPT | Performed by: INTERNAL MEDICINE

## 2018-01-01 PROCEDURE — 82803 BLOOD GASES ANY COMBINATION: CPT

## 2018-01-01 PROCEDURE — 77280 THER RAD SIMULAJ FIELD SMPL: CPT | Mod: 26 | Performed by: RADIOLOGY

## 2018-01-01 PROCEDURE — 160002 HCHG RECOVERY MINUTES (STAT): Performed by: SURGERY

## 2018-01-01 PROCEDURE — 160048 HCHG OR STATISTICAL LEVEL 1-5: Performed by: SURGERY

## 2018-01-01 PROCEDURE — 96374 THER/PROPH/DIAG INJ IV PUSH: CPT

## 2018-01-01 PROCEDURE — 85730 THROMBOPLASTIN TIME PARTIAL: CPT

## 2018-01-01 PROCEDURE — 74181 MRI ABDOMEN W/O CONTRAST: CPT

## 2018-01-01 PROCEDURE — 85027 COMPLETE CBC AUTOMATED: CPT

## 2018-01-01 PROCEDURE — 86850 RBC ANTIBODY SCREEN: CPT

## 2018-01-01 PROCEDURE — 770001 HCHG ROOM/CARE - MED/SURG/GYN PRIV*

## 2018-01-01 PROCEDURE — 83615 LACTATE (LD) (LDH) ENZYME: CPT

## 2018-01-01 PROCEDURE — 77280 THER RAD SIMULAJ FIELD SMPL: CPT | Performed by: RADIOLOGY

## 2018-01-01 PROCEDURE — 71045 X-RAY EXAM CHEST 1 VIEW: CPT

## 2018-01-01 PROCEDURE — 84550 ASSAY OF BLOOD/URIC ACID: CPT

## 2018-01-01 PROCEDURE — 77307 TELETHX ISODOSE PLAN CPLX: CPT | Performed by: RADIOLOGY

## 2018-01-01 PROCEDURE — 77263 THER RADIOLOGY TX PLNG CPLX: CPT | Performed by: RADIOLOGY

## 2018-01-01 PROCEDURE — 160002 HCHG RECOVERY MINUTES (STAT)

## 2018-01-01 PROCEDURE — 99221 1ST HOSP IP/OBS SF/LOW 40: CPT | Performed by: INTERNAL MEDICINE

## 2018-01-01 PROCEDURE — C1788 PORT, INDWELLING, IMP: HCPCS | Performed by: SURGERY

## 2018-01-01 PROCEDURE — 73552 X-RAY EXAM OF FEMUR 2/>: CPT | Mod: LT

## 2018-01-01 PROCEDURE — 700111 HCHG RX REV CODE 636 W/ 250 OVERRIDE (IP): Performed by: INTERNAL MEDICINE

## 2018-01-01 PROCEDURE — 93005 ELECTROCARDIOGRAM TRACING: CPT | Performed by: EMERGENCY MEDICINE

## 2018-01-01 PROCEDURE — P9016 RBC LEUKOCYTES REDUCED: HCPCS

## 2018-01-01 PROCEDURE — 99213 OFFICE O/P EST LOW 20 MIN: CPT | Performed by: NURSE PRACTITIONER

## 2018-01-01 PROCEDURE — 77295 3-D RADIOTHERAPY PLAN: CPT | Performed by: RADIOLOGY

## 2018-01-01 PROCEDURE — 700102 HCHG RX REV CODE 250 W/ 637 OVERRIDE(OP): Performed by: NURSE PRACTITIONER

## 2018-01-01 PROCEDURE — 160035 HCHG PACU - 1ST 60 MINS PHASE I: Performed by: SURGERY

## 2018-01-01 PROCEDURE — 77334 RADIATION TREATMENT AID(S): CPT | Performed by: RADIOLOGY

## 2018-01-01 PROCEDURE — 80061 LIPID PANEL: CPT

## 2018-01-01 PROCEDURE — A6402 STERILE GAUZE <= 16 SQ IN: HCPCS | Performed by: SURGERY

## 2018-01-01 PROCEDURE — 99232 SBSQ HOSP IP/OBS MODERATE 35: CPT | Performed by: FAMILY MEDICINE

## 2018-01-01 PROCEDURE — A9270 NON-COVERED ITEM OR SERVICE: HCPCS | Performed by: NURSE PRACTITIONER

## 2018-01-01 PROCEDURE — 700105 HCHG RX REV CODE 258: Performed by: EMERGENCY MEDICINE

## 2018-01-01 PROCEDURE — 85007 BL SMEAR W/DIFF WBC COUNT: CPT

## 2018-01-01 PROCEDURE — A4212 NON CORING NEEDLE OR STYLET: HCPCS

## 2018-01-01 PROCEDURE — 77336 RADIATION PHYSICS CONSULT: CPT | Performed by: RADIOLOGY

## 2018-01-01 PROCEDURE — 77290 THER RAD SIMULAJ FIELD CPLX: CPT | Performed by: RADIOLOGY

## 2018-01-01 PROCEDURE — 700102 HCHG RX REV CODE 250 W/ 637 OVERRIDE(OP): Performed by: FAMILY MEDICINE

## 2018-01-01 PROCEDURE — 36591 DRAW BLOOD OFF VENOUS DEVICE: CPT

## 2018-01-01 PROCEDURE — 86635 COCCIDIOIDES ANTIBODY: CPT | Mod: 91

## 2018-01-01 PROCEDURE — 88175 CYTOPATH C/V AUTO FLUID REDO: CPT

## 2018-01-01 PROCEDURE — 94760 N-INVAS EAR/PLS OXIMETRY 1: CPT

## 2018-01-01 PROCEDURE — 700111 HCHG RX REV CODE 636 W/ 250 OVERRIDE (IP): Performed by: HOSPITALIST

## 2018-01-01 PROCEDURE — 306780 HCHG STAT FOR TRANSFUSION PER CASE

## 2018-01-01 PROCEDURE — 700117 HCHG RX CONTRAST REV CODE 255: Performed by: EMERGENCY MEDICINE

## 2018-01-01 PROCEDURE — 77300 RADIATION THERAPY DOSE PLAN: CPT | Mod: 26 | Performed by: RADIOLOGY

## 2018-01-01 PROCEDURE — 73060 X-RAY EXAM OF HUMERUS: CPT | Mod: RT

## 2018-01-01 PROCEDURE — 160036 HCHG PACU - EA ADDL 30 MINS PHASE I: Performed by: SURGERY

## 2018-01-01 PROCEDURE — 71046 X-RAY EXAM CHEST 2 VIEWS: CPT | Mod: TC,FY | Performed by: FAMILY MEDICINE

## 2018-01-01 PROCEDURE — 160009 HCHG ANES TIME/MIN: Performed by: SURGERY

## 2018-01-01 PROCEDURE — 83690 ASSAY OF LIPASE: CPT

## 2018-01-01 PROCEDURE — 74018 RADEX ABDOMEN 1 VIEW: CPT

## 2018-01-01 PROCEDURE — 86900 BLOOD TYPING SEROLOGIC ABO: CPT

## 2018-01-01 PROCEDURE — 85379 FIBRIN DEGRADATION QUANT: CPT

## 2018-01-01 PROCEDURE — 160039 HCHG SURGERY MINUTES - EA ADDL 1 MIN LEVEL 3: Performed by: SURGERY

## 2018-01-01 PROCEDURE — 700105 HCHG RX REV CODE 258: Performed by: INTERNAL MEDICINE

## 2018-01-01 PROCEDURE — 770020 HCHG ROOM/CARE - TELE (206)

## 2018-01-01 PROCEDURE — 77412 RADIATION TX DELIVERY LVL 3: CPT | Performed by: RADIOLOGY

## 2018-01-01 PROCEDURE — A9270 NON-COVERED ITEM OR SERVICE: HCPCS | Performed by: FAMILY MEDICINE

## 2018-01-01 PROCEDURE — 93005 ELECTROCARDIOGRAM TRACING: CPT

## 2018-01-01 PROCEDURE — 99232 SBSQ HOSP IP/OBS MODERATE 35: CPT | Performed by: HOSPITALIST

## 2018-01-01 PROCEDURE — P9016 RBC LEUKOCYTES REDUCED: HCPCS | Mod: 91

## 2018-01-01 PROCEDURE — 32405 CT-BIOPSY-LUNG/MEDIASTINUM: CPT | Mod: RT

## 2018-01-01 PROCEDURE — 86635 COCCIDIOIDES ANTIBODY: CPT

## 2018-01-01 PROCEDURE — 302135 SEQUENTIAL COMPRESSION MACHINE: Performed by: EMERGENCY MEDICINE

## 2018-01-01 PROCEDURE — 80048 BASIC METABOLIC PNL TOTAL CA: CPT

## 2018-01-01 PROCEDURE — 99285 EMERGENCY DEPT VISIT HI MDM: CPT

## 2018-01-01 PROCEDURE — 77300 RADIATION THERAPY DOSE PLAN: CPT | Performed by: RADIOLOGY

## 2018-01-01 PROCEDURE — 99214 OFFICE O/P EST MOD 30 MIN: CPT | Performed by: NURSE PRACTITIONER

## 2018-01-01 PROCEDURE — 501838 HCHG SUTURE GENERAL: Performed by: SURGERY

## 2018-01-01 PROCEDURE — A9503 TC99M MEDRONATE: HCPCS

## 2018-01-01 PROCEDURE — 88305 TISSUE EXAM BY PATHOLOGIST: CPT

## 2018-01-01 PROCEDURE — 88312 SPECIAL STAINS GROUP 1: CPT

## 2018-01-01 PROCEDURE — 85610 PROTHROMBIN TIME: CPT

## 2018-01-01 PROCEDURE — 86923 COMPATIBILITY TEST ELECTRIC: CPT | Mod: 91

## 2018-01-01 PROCEDURE — 160028 HCHG SURGERY MINUTES - 1ST 30 MINS LEVEL 3: Performed by: SURGERY

## 2018-01-01 PROCEDURE — A9270 NON-COVERED ITEM OR SERVICE: HCPCS | Performed by: HOSPITALIST

## 2018-01-01 PROCEDURE — 83880 ASSAY OF NATRIURETIC PEPTIDE: CPT

## 2018-01-01 PROCEDURE — 82248 BILIRUBIN DIRECT: CPT

## 2018-01-01 PROCEDURE — 93306 TTE W/DOPPLER COMPLETE: CPT | Mod: 26 | Performed by: INTERNAL MEDICINE

## 2018-01-01 PROCEDURE — 86304 IMMUNOASSAY TUMOR CA 125: CPT

## 2018-01-01 PROCEDURE — 700101 HCHG RX REV CODE 250: Performed by: FAMILY MEDICINE

## 2018-01-01 PROCEDURE — 700117 HCHG RX CONTRAST REV CODE 255: Performed by: INTERNAL MEDICINE

## 2018-01-01 PROCEDURE — 93010 ELECTROCARDIOGRAM REPORT: CPT | Performed by: INTERNAL MEDICINE

## 2018-01-01 PROCEDURE — G0279 TOMOSYNTHESIS, MAMMO: HCPCS

## 2018-01-01 PROCEDURE — 85025 COMPLETE CBC W/AUTO DIFF WBC: CPT | Mod: 91

## 2018-01-01 PROCEDURE — 99215 OFFICE O/P EST HI 40 MIN: CPT | Performed by: RADIOLOGY

## 2018-01-01 PROCEDURE — 700111 HCHG RX REV CODE 636 W/ 250 OVERRIDE (IP): Performed by: NURSE PRACTITIONER

## 2018-01-01 PROCEDURE — 77307 TELETHX ISODOSE PLAN CPLX: CPT | Mod: 26 | Performed by: RADIOLOGY

## 2018-01-01 PROCEDURE — 93306 TTE W/DOPPLER COMPLETE: CPT

## 2018-01-01 PROCEDURE — 77290 THER RAD SIMULAJ FIELD CPLX: CPT | Mod: 26 | Performed by: RADIOLOGY

## 2018-01-01 PROCEDURE — 85046 RETICYTE/HGB CONCENTRATE: CPT

## 2018-01-01 DEVICE — CATHETER POWERPORT CLEARVUE MRI 8FR ATTACHABLE CHRONOFLEX: Type: IMPLANTABLE DEVICE | Status: FUNCTIONAL

## 2018-01-01 RX ORDER — ACETAMINOPHEN 325 MG/1
650 TABLET ORAL ONCE
Status: CANCELLED | OUTPATIENT
Start: 2018-01-01 | End: 2018-01-01

## 2018-01-01 RX ORDER — OXYCODONE HYDROCHLORIDE 5 MG/1
2.5 TABLET ORAL
Status: DISCONTINUED | OUTPATIENT
Start: 2018-01-01 | End: 2018-01-01

## 2018-01-01 RX ORDER — HYDROCODONE BITARTRATE AND ACETAMINOPHEN 5; 325 MG/1; MG/1
1-2 TABLET ORAL EVERY 4 HOURS PRN
Status: DISCONTINUED | OUTPATIENT
Start: 2018-01-01 | End: 2018-01-01 | Stop reason: HOSPADM

## 2018-01-01 RX ORDER — LIDOCAINE HYDROCHLORIDE 10 MG/ML
20 INJECTION, SOLUTION INFILTRATION; PERINEURAL
Status: CANCELLED | OUTPATIENT
Start: 2018-01-01

## 2018-01-01 RX ORDER — PROMETHAZINE HYDROCHLORIDE 25 MG/1
12.5-25 TABLET ORAL EVERY 4 HOURS PRN
Status: DISCONTINUED | OUTPATIENT
Start: 2018-01-01 | End: 2018-01-01 | Stop reason: HOSPADM

## 2018-01-01 RX ORDER — ACETAMINOPHEN 325 MG/1
650 TABLET ORAL PRN
Status: CANCELLED | OUTPATIENT
Start: 2018-01-01

## 2018-01-01 RX ORDER — ATROPINE SULFATE 10 MG/ML
2 SOLUTION/ DROPS OPHTHALMIC EVERY 4 HOURS PRN
Status: DISCONTINUED | OUTPATIENT
Start: 2018-01-01 | End: 2018-01-01

## 2018-01-01 RX ORDER — OXYCODONE HYDROCHLORIDE 5 MG/1
10 TABLET ORAL
Status: DISCONTINUED | OUTPATIENT
Start: 2018-01-01 | End: 2018-01-01 | Stop reason: HOSPADM

## 2018-01-01 RX ORDER — OMEPRAZOLE 20 MG/1
20 CAPSULE, DELAYED RELEASE ORAL DAILY
Status: DISCONTINUED | OUTPATIENT
Start: 2018-01-01 | End: 2018-01-01

## 2018-01-01 RX ORDER — NALOXONE HYDROCHLORIDE 0.4 MG/ML
INJECTION, SOLUTION INTRAMUSCULAR; INTRAVENOUS; SUBCUTANEOUS
Status: COMPLETED
Start: 2018-01-01 | End: 2018-01-01

## 2018-01-01 RX ORDER — LORAZEPAM 2 MG/ML
1 INJECTION INTRAMUSCULAR
Status: DISCONTINUED | OUTPATIENT
Start: 2018-01-01 | End: 2018-01-01 | Stop reason: HOSPADM

## 2018-01-01 RX ORDER — PROMETHAZINE HYDROCHLORIDE 25 MG/1
12.5-25 SUPPOSITORY RECTAL EVERY 4 HOURS PRN
Status: DISCONTINUED | OUTPATIENT
Start: 2018-01-01 | End: 2018-01-01 | Stop reason: HOSPADM

## 2018-01-01 RX ORDER — BUPIVACAINE HYDROCHLORIDE 5 MG/ML
INJECTION, SOLUTION EPIDURAL; INTRACAUDAL
Status: DISCONTINUED
Start: 2018-01-01 | End: 2018-01-01 | Stop reason: HOSPADM

## 2018-01-01 RX ORDER — ATROPINE SULFATE 10 MG/ML
2 SOLUTION/ DROPS OPHTHALMIC
Status: DISCONTINUED | OUTPATIENT
Start: 2018-01-01 | End: 2018-01-01 | Stop reason: HOSPADM

## 2018-01-01 RX ORDER — DIPHENHYDRAMINE HCL 25 MG
25 TABLET ORAL ONCE
Status: COMPLETED | OUTPATIENT
Start: 2018-01-01 | End: 2018-01-01

## 2018-01-01 RX ORDER — CLONIDINE HYDROCHLORIDE 0.1 MG/1
0.1 TABLET ORAL EVERY 6 HOURS PRN
Status: DISCONTINUED | OUTPATIENT
Start: 2018-01-01 | End: 2018-01-01 | Stop reason: ALTCHOICE

## 2018-01-01 RX ORDER — BISACODYL 10 MG
10 SUPPOSITORY, RECTAL RECTAL
Status: DISCONTINUED | OUTPATIENT
Start: 2018-01-01 | End: 2018-01-01 | Stop reason: ALTCHOICE

## 2018-01-01 RX ORDER — POLYETHYLENE GLYCOL 3350 17 G/17G
1 POWDER, FOR SOLUTION ORAL
Status: DISCONTINUED | OUTPATIENT
Start: 2018-01-01 | End: 2018-01-01 | Stop reason: ALTCHOICE

## 2018-01-01 RX ORDER — SODIUM CHLORIDE AND POTASSIUM CHLORIDE 150; 900 MG/100ML; MG/100ML
INJECTION, SOLUTION INTRAVENOUS CONTINUOUS
Status: DISCONTINUED | OUTPATIENT
Start: 2018-01-01 | End: 2018-01-01 | Stop reason: HOSPADM

## 2018-01-01 RX ORDER — ENALAPRILAT 1.25 MG/ML
1.25 INJECTION INTRAVENOUS EVERY 6 HOURS PRN
Status: DISCONTINUED | OUTPATIENT
Start: 2018-01-01 | End: 2018-01-01 | Stop reason: ALTCHOICE

## 2018-01-01 RX ORDER — DIPHENHYDRAMINE HCL 25 MG
25 TABLET ORAL ONCE
Status: CANCELLED | OUTPATIENT
Start: 2018-01-01 | End: 2018-01-01

## 2018-01-01 RX ORDER — HYDROCODONE BITARTRATE AND ACETAMINOPHEN 5; 325 MG/1; MG/1
1-2 TABLET ORAL EVERY 4 HOURS PRN
COMMUNITY
End: 2018-01-01 | Stop reason: CLARIF

## 2018-01-01 RX ORDER — OXYCODONE HYDROCHLORIDE 5 MG/1
5 TABLET ORAL
Status: DISCONTINUED | OUTPATIENT
Start: 2018-01-01 | End: 2018-01-01 | Stop reason: HOSPADM

## 2018-01-01 RX ORDER — HEPARIN SODIUM 5000 [USP'U]/ML
5000 INJECTION, SOLUTION INTRAVENOUS; SUBCUTANEOUS EVERY 8 HOURS
Status: DISCONTINUED | OUTPATIENT
Start: 2018-01-01 | End: 2018-01-01

## 2018-01-01 RX ORDER — BUPIVACAINE HYDROCHLORIDE 5 MG/ML
INJECTION, SOLUTION EPIDURAL; INTRACAUDAL
Status: DISCONTINUED | OUTPATIENT
Start: 2018-01-01 | End: 2018-01-01 | Stop reason: HOSPADM

## 2018-01-01 RX ORDER — OXYCODONE HYDROCHLORIDE 5 MG/1
5 TABLET ORAL
Status: DISCONTINUED | OUTPATIENT
Start: 2018-01-01 | End: 2018-01-01

## 2018-01-01 RX ORDER — FLUMAZENIL 0.1 MG/ML
INJECTION INTRAVENOUS
Status: COMPLETED
Start: 2018-01-01 | End: 2018-01-01

## 2018-01-01 RX ORDER — SODIUM CHLORIDE 9 MG/ML
500 INJECTION, SOLUTION INTRAVENOUS ONCE
Status: CANCELLED | OUTPATIENT
Start: 2018-01-01 | End: 2018-01-01

## 2018-01-01 RX ORDER — M-VIT,TX,IRON,MINS/CALC/FOLIC 27MG-0.4MG
1 TABLET ORAL DAILY
COMMUNITY
End: 2018-01-01 | Stop reason: CLARIF

## 2018-01-01 RX ORDER — LORAZEPAM 2 MG/ML
0.5 CONCENTRATE ORAL
Status: DISCONTINUED | OUTPATIENT
Start: 2018-01-01 | End: 2018-01-01

## 2018-01-01 RX ORDER — FLUCONAZOLE 100 MG/1
400 TABLET ORAL DAILY
Qty: 360 TAB | Refills: 2 | Status: SHIPPED | OUTPATIENT
Start: 2018-01-01 | End: 2018-01-01 | Stop reason: SDUPTHER

## 2018-01-01 RX ORDER — SODIUM CHLORIDE, SODIUM LACTATE, POTASSIUM CHLORIDE, CALCIUM CHLORIDE 600; 310; 30; 20 MG/100ML; MG/100ML; MG/100ML; MG/100ML
INJECTION, SOLUTION INTRAVENOUS CONTINUOUS
Status: DISCONTINUED | OUTPATIENT
Start: 2018-01-01 | End: 2018-01-01 | Stop reason: HOSPADM

## 2018-01-01 RX ORDER — ONDANSETRON 4 MG/1
4 TABLET, ORALLY DISINTEGRATING ORAL EVERY 4 HOURS PRN
Status: DISCONTINUED | OUTPATIENT
Start: 2018-01-01 | End: 2018-01-01 | Stop reason: HOSPADM

## 2018-01-01 RX ORDER — SODIUM CHLORIDE 9 MG/ML
INJECTION, SOLUTION INTRAVENOUS CONTINUOUS
Status: DISCONTINUED | OUTPATIENT
Start: 2018-01-01 | End: 2018-01-01

## 2018-01-01 RX ORDER — SODIUM FLUORIDE 0.9 MG/ML
1 MOUTHWASH DENTAL
Refills: 6 | COMMUNITY
Start: 2018-01-01

## 2018-01-01 RX ORDER — ACETAMINOPHEN 325 MG/1
650 TABLET ORAL ONCE
Status: COMPLETED | OUTPATIENT
Start: 2018-01-01 | End: 2018-01-01

## 2018-01-01 RX ORDER — EVEROLIMUS 2.5 MG/1
2.5 TABLET ORAL EVERY MORNING
COMMUNITY
End: 2018-01-01

## 2018-01-01 RX ORDER — HEPARIN SODIUM 5000 [USP'U]/ML
INJECTION, SOLUTION INTRAVENOUS; SUBCUTANEOUS
Status: DISCONTINUED | OUTPATIENT
Start: 2018-01-01 | End: 2018-01-01 | Stop reason: HOSPADM

## 2018-01-01 RX ORDER — FLUCONAZOLE 100 MG/1
400 TABLET ORAL DAILY
Status: DISCONTINUED | OUTPATIENT
Start: 2018-01-01 | End: 2018-01-01 | Stop reason: ALTCHOICE

## 2018-01-01 RX ORDER — SODIUM CHLORIDE, SODIUM LACTATE, POTASSIUM CHLORIDE, CALCIUM CHLORIDE 600; 310; 30; 20 MG/100ML; MG/100ML; MG/100ML; MG/100ML
INJECTION, SOLUTION INTRAVENOUS CONTINUOUS
Status: DISCONTINUED | OUTPATIENT
Start: 2018-01-01 | End: 2018-01-01

## 2018-01-01 RX ORDER — HYDROMORPHONE HYDROCHLORIDE 2 MG/ML
0.5 INJECTION, SOLUTION INTRAMUSCULAR; INTRAVENOUS; SUBCUTANEOUS
Status: DISCONTINUED | OUTPATIENT
Start: 2018-01-01 | End: 2018-01-01 | Stop reason: HOSPADM

## 2018-01-01 RX ORDER — LORAZEPAM 2 MG/ML
1 CONCENTRATE ORAL
Status: DISCONTINUED | OUTPATIENT
Start: 2018-01-01 | End: 2018-01-01 | Stop reason: HOSPADM

## 2018-01-01 RX ORDER — ONDANSETRON 2 MG/ML
4 INJECTION INTRAMUSCULAR; INTRAVENOUS PRN
Status: ACTIVE | OUTPATIENT
Start: 2018-01-01 | End: 2018-01-01

## 2018-01-01 RX ORDER — ONDANSETRON 2 MG/ML
4 INJECTION INTRAMUSCULAR; INTRAVENOUS EVERY 4 HOURS PRN
Status: DISCONTINUED | OUTPATIENT
Start: 2018-01-01 | End: 2018-01-01 | Stop reason: HOSPADM

## 2018-01-01 RX ORDER — MIDAZOLAM HYDROCHLORIDE 1 MG/ML
INJECTION INTRAMUSCULAR; INTRAVENOUS
Status: COMPLETED
Start: 2018-01-01 | End: 2018-01-01

## 2018-01-01 RX ORDER — SODIUM CHLORIDE 9 MG/ML
500 INJECTION, SOLUTION INTRAVENOUS
Status: ACTIVE | OUTPATIENT
Start: 2018-01-01 | End: 2018-01-01

## 2018-01-01 RX ORDER — ONDANSETRON 2 MG/ML
4 INJECTION INTRAMUSCULAR; INTRAVENOUS EVERY 8 HOURS PRN
Status: DISCONTINUED | OUTPATIENT
Start: 2018-01-01 | End: 2018-01-01 | Stop reason: HOSPADM

## 2018-01-01 RX ORDER — MIDAZOLAM HYDROCHLORIDE 1 MG/ML
.5-2 INJECTION INTRAMUSCULAR; INTRAVENOUS PRN
Status: ACTIVE | OUTPATIENT
Start: 2018-01-01 | End: 2018-01-01

## 2018-01-01 RX ORDER — FLUCONAZOLE 200 MG/1
400 TABLET ORAL DAILY
COMMUNITY
End: 2018-01-01 | Stop reason: CLARIF

## 2018-01-01 RX ORDER — HYDROMORPHONE HYDROCHLORIDE 1 MG/ML
1 INJECTION, SOLUTION INTRAMUSCULAR; INTRAVENOUS; SUBCUTANEOUS ONCE
Status: COMPLETED | OUTPATIENT
Start: 2018-01-01 | End: 2018-01-01

## 2018-01-01 RX ORDER — HYDROMORPHONE HYDROCHLORIDE 1 MG/ML
0.5 INJECTION, SOLUTION INTRAMUSCULAR; INTRAVENOUS; SUBCUTANEOUS
Status: DISCONTINUED | OUTPATIENT
Start: 2018-01-01 | End: 2018-01-01 | Stop reason: HOSPADM

## 2018-01-01 RX ORDER — HYDRALAZINE HYDROCHLORIDE 20 MG/ML
10 INJECTION INTRAMUSCULAR; INTRAVENOUS EVERY 4 HOURS PRN
Status: DISCONTINUED | OUTPATIENT
Start: 2018-01-01 | End: 2018-01-01 | Stop reason: ALTCHOICE

## 2018-01-01 RX ORDER — MORPHINE SULFATE 10 MG/5ML
5 SOLUTION ORAL
Status: DISCONTINUED | OUTPATIENT
Start: 2018-01-01 | End: 2018-01-01

## 2018-01-01 RX ORDER — AMOXICILLIN 250 MG
2 CAPSULE ORAL 2 TIMES DAILY
Status: DISCONTINUED | OUTPATIENT
Start: 2018-01-01 | End: 2018-01-01 | Stop reason: ALTCHOICE

## 2018-01-01 RX ORDER — CAPECITABINE 500 MG/1
1250 TABLET, FILM COATED ORAL 2 TIMES DAILY
Status: ON HOLD | COMMUNITY
End: 2018-01-01

## 2018-01-01 RX ORDER — HEPARIN SODIUM,PORCINE 1000/ML
VIAL (ML) INJECTION
Status: DISCONTINUED
Start: 2018-01-01 | End: 2018-01-01 | Stop reason: HOSPADM

## 2018-01-01 RX ORDER — ACETAMINOPHEN 325 MG/1
650 TABLET ORAL EVERY 6 HOURS PRN
Status: DISCONTINUED | OUTPATIENT
Start: 2018-01-01 | End: 2018-01-01 | Stop reason: HOSPADM

## 2018-01-01 RX ORDER — LORAZEPAM 2 MG/ML
0.5 INJECTION INTRAMUSCULAR
Status: DISCONTINUED | OUTPATIENT
Start: 2018-01-01 | End: 2018-01-01

## 2018-01-01 RX ORDER — MORPHINE SULFATE 10 MG/ML
2 INJECTION, SOLUTION INTRAMUSCULAR; INTRAVENOUS
Status: DISCONTINUED | OUTPATIENT
Start: 2018-01-01 | End: 2018-01-01

## 2018-01-01 RX ADMIN — MIDAZOLAM HYDROCHLORIDE 1 MG: 1 INJECTION INTRAMUSCULAR; INTRAVENOUS at 11:52

## 2018-01-01 RX ADMIN — STANDARDIZED SENNA CONCENTRATE AND DOCUSATE SODIUM 2 TABLET: 8.6; 5 TABLET, FILM COATED ORAL at 06:19

## 2018-01-01 RX ADMIN — HEPARIN SODIUM 5000 UNITS: 5000 INJECTION, SOLUTION INTRAVENOUS; SUBCUTANEOUS at 06:20

## 2018-01-01 RX ADMIN — HYDROCORTISONE SODIUM SUCCINATE 100 MG: 100 INJECTION, POWDER, FOR SOLUTION INTRAMUSCULAR; INTRAVENOUS at 07:55

## 2018-01-01 RX ADMIN — SODIUM CHLORIDE, POTASSIUM CHLORIDE, SODIUM LACTATE AND CALCIUM CHLORIDE: 600; 310; 30; 20 INJECTION, SOLUTION INTRAVENOUS at 16:34

## 2018-01-01 RX ADMIN — HEPARIN SODIUM 5000 UNITS: 5000 INJECTION, SOLUTION INTRAVENOUS; SUBCUTANEOUS at 13:37

## 2018-01-01 RX ADMIN — LORAZEPAM 0.5 MG: 2 INJECTION INTRAMUSCULAR; INTRAVENOUS at 21:00

## 2018-01-01 RX ADMIN — STANDARDIZED SENNA CONCENTRATE AND DOCUSATE SODIUM 2 TABLET: 8.6; 5 TABLET, FILM COATED ORAL at 16:32

## 2018-01-01 RX ADMIN — MORPHINE SULFATE 2 MG: 10 INJECTION INTRAVENOUS at 03:57

## 2018-01-01 RX ADMIN — OXYCODONE HYDROCHLORIDE 5 MG: 5 TABLET ORAL at 06:15

## 2018-01-01 RX ADMIN — ONDANSETRON 4 MG: 4 TABLET, ORALLY DISINTEGRATING ORAL at 06:19

## 2018-01-01 RX ADMIN — SODIUM CHLORIDE, POTASSIUM CHLORIDE, SODIUM LACTATE AND CALCIUM CHLORIDE: 600; 310; 30; 20 INJECTION, SOLUTION INTRAVENOUS at 00:51

## 2018-01-01 RX ADMIN — IOHEXOL 50 ML: 240 INJECTION, SOLUTION INTRATHECAL; INTRAVASCULAR; INTRAVENOUS; ORAL at 12:22

## 2018-01-01 RX ADMIN — ACETAMINOPHEN 650 MG: 325 TABLET, FILM COATED ORAL at 20:17

## 2018-01-01 RX ADMIN — FLUCONAZOLE 400 MG: 100 TABLET ORAL at 14:48

## 2018-01-01 RX ADMIN — SODIUM CHLORIDE: 9 INJECTION, SOLUTION INTRAVENOUS at 07:25

## 2018-01-01 RX ADMIN — LORAZEPAM 0.5 MG: 2 INJECTION INTRAMUSCULAR; INTRAVENOUS at 07:53

## 2018-01-01 RX ADMIN — PROCHLORPERAZINE EDISYLATE 10 MG: 5 INJECTION INTRAMUSCULAR; INTRAVENOUS at 00:56

## 2018-01-01 RX ADMIN — IOHEXOL 100 ML: 350 INJECTION, SOLUTION INTRAVENOUS at 12:21

## 2018-01-01 RX ADMIN — LORAZEPAM 0.5 MG: 2 SOLUTION, CONCENTRATE ORAL at 18:49

## 2018-01-01 RX ADMIN — HEPARIN 500 UNITS: 100 SYRINGE at 12:45

## 2018-01-01 RX ADMIN — HEPARIN SODIUM 5000 UNITS: 5000 INJECTION, SOLUTION INTRAVENOUS; SUBCUTANEOUS at 21:52

## 2018-01-01 RX ADMIN — IOHEXOL 50 ML: 240 INJECTION, SOLUTION INTRATHECAL; INTRAVASCULAR; INTRAVENOUS; ORAL at 11:31

## 2018-01-01 RX ADMIN — HYDROMORPHONE HYDROCHLORIDE 0.5 MG: 1 INJECTION, SOLUTION INTRAMUSCULAR; INTRAVENOUS; SUBCUTANEOUS at 14:03

## 2018-01-01 RX ADMIN — HEPARIN SODIUM 5000 UNITS: 5000 INJECTION, SOLUTION INTRAVENOUS; SUBCUTANEOUS at 22:50

## 2018-01-01 RX ADMIN — ONDANSETRON 4 MG: 2 INJECTION INTRAMUSCULAR; INTRAVENOUS at 13:32

## 2018-01-01 RX ADMIN — ATROPINE SULFATE 2 DROP: 10 SOLUTION/ DROPS OPHTHALMIC at 06:42

## 2018-01-01 RX ADMIN — ONDANSETRON 4 MG: 2 INJECTION INTRAMUSCULAR; INTRAVENOUS at 19:47

## 2018-01-01 RX ADMIN — SODIUM CHLORIDE, POTASSIUM CHLORIDE, SODIUM LACTATE AND CALCIUM CHLORIDE: 600; 310; 30; 20 INJECTION, SOLUTION INTRAVENOUS at 14:53

## 2018-01-01 RX ADMIN — ACETAMINOPHEN 650 MG: 325 TABLET, FILM COATED ORAL at 08:12

## 2018-01-01 RX ADMIN — IOHEXOL 100 ML: 350 INJECTION, SOLUTION INTRAVENOUS at 11:31

## 2018-01-01 RX ADMIN — ONDANSETRON 4 MG: 2 INJECTION INTRAMUSCULAR; INTRAVENOUS at 13:39

## 2018-01-01 RX ADMIN — MORPHINE SULFATE 5 MG: 10 SOLUTION ORAL at 20:11

## 2018-01-01 RX ADMIN — OXYCODONE HYDROCHLORIDE 2.5 MG: 5 TABLET ORAL at 20:21

## 2018-01-01 RX ADMIN — SODIUM CHLORIDE, POTASSIUM CHLORIDE, SODIUM LACTATE AND CALCIUM CHLORIDE: 600; 310; 30; 20 INJECTION, SOLUTION INTRAVENOUS at 00:47

## 2018-01-01 RX ADMIN — FLUCONAZOLE 400 MG: 100 TABLET ORAL at 06:20

## 2018-01-01 RX ADMIN — FENTANYL CITRATE 25 MCG: 50 INJECTION, SOLUTION INTRAMUSCULAR; INTRAVENOUS at 11:47

## 2018-01-01 RX ADMIN — FLUCONAZOLE 400 MG: 100 TABLET ORAL at 06:09

## 2018-01-01 RX ADMIN — LORAZEPAM 0.5 MG: 2 INJECTION INTRAMUSCULAR; INTRAVENOUS at 00:32

## 2018-01-01 RX ADMIN — HYDROMORPHONE HYDROCHLORIDE 1 MG: 1 INJECTION, SOLUTION INTRAMUSCULAR; INTRAVENOUS; SUBCUTANEOUS at 01:00

## 2018-01-01 RX ADMIN — ATROPINE SULFATE 2 DROP: 10 SOLUTION/ DROPS OPHTHALMIC at 11:13

## 2018-01-01 RX ADMIN — MIDAZOLAM HYDROCHLORIDE 1 MG: 1 INJECTION INTRAMUSCULAR; INTRAVENOUS at 11:47

## 2018-01-01 RX ADMIN — LORAZEPAM 1 MG: 2 SOLUTION, CONCENTRATE ORAL at 16:19

## 2018-01-01 RX ADMIN — LORAZEPAM 0.5 MG: 2 INJECTION INTRAMUSCULAR; INTRAVENOUS at 08:56

## 2018-01-01 RX ADMIN — LORAZEPAM 1 MG: 2 INJECTION INTRAMUSCULAR; INTRAVENOUS at 12:20

## 2018-01-01 RX ADMIN — HYDROMORPHONE HYDROCHLORIDE 0.5 MG: 1 INJECTION, SOLUTION INTRAMUSCULAR; INTRAVENOUS; SUBCUTANEOUS at 17:06

## 2018-01-01 RX ADMIN — DIPHENHYDRAMINE HCL 25 MG: 25 TABLET ORAL at 07:55

## 2018-01-01 RX ADMIN — ONDANSETRON 4 MG: 2 INJECTION INTRAMUSCULAR; INTRAVENOUS at 17:59

## 2018-01-01 RX ADMIN — PROCHLORPERAZINE EDISYLATE 10 MG: 5 INJECTION INTRAMUSCULAR; INTRAVENOUS at 21:52

## 2018-01-01 RX ADMIN — LORAZEPAM 0.5 MG: 2 INJECTION INTRAMUSCULAR; INTRAVENOUS at 01:59

## 2018-01-01 RX ADMIN — OXYCODONE HYDROCHLORIDE 2.5 MG: 5 TABLET ORAL at 19:47

## 2018-01-01 RX ADMIN — MIDAZOLAM 1 MG: 1 INJECTION INTRAMUSCULAR; INTRAVENOUS at 11:47

## 2018-01-01 RX ADMIN — OMEPRAZOLE 20 MG: 20 CAPSULE, DELAYED RELEASE ORAL at 06:09

## 2018-01-01 RX ADMIN — HEPARIN SODIUM 5000 UNITS: 5000 INJECTION, SOLUTION INTRAVENOUS; SUBCUTANEOUS at 14:48

## 2018-01-01 RX ADMIN — OXYCODONE HYDROCHLORIDE 5 MG: 5 TABLET ORAL at 16:33

## 2018-01-01 RX ADMIN — OXYCODONE HYDROCHLORIDE 2.5 MG: 5 TABLET ORAL at 00:53

## 2018-01-01 RX ADMIN — SODIUM CHLORIDE, POTASSIUM CHLORIDE, SODIUM LACTATE AND CALCIUM CHLORIDE: 600; 310; 30; 20 INJECTION, SOLUTION INTRAVENOUS at 09:09

## 2018-01-01 RX ADMIN — OMEPRAZOLE 20 MG: 20 CAPSULE, DELAYED RELEASE ORAL at 17:52

## 2018-01-01 RX ADMIN — LORAZEPAM 0.5 MG: 2 INJECTION INTRAMUSCULAR; INTRAVENOUS at 22:06

## 2018-01-01 RX ADMIN — PROCHLORPERAZINE EDISYLATE 10 MG: 5 INJECTION INTRAMUSCULAR; INTRAVENOUS at 16:33

## 2018-01-01 RX ADMIN — ACETAMINOPHEN 650 MG: 325 TABLET ORAL at 07:55

## 2018-01-01 RX ADMIN — ONDANSETRON 4 MG: 2 INJECTION INTRAMUSCULAR; INTRAVENOUS at 22:50

## 2018-01-01 RX ADMIN — PROCHLORPERAZINE EDISYLATE 10 MG: 5 INJECTION INTRAMUSCULAR; INTRAVENOUS at 09:51

## 2018-01-01 RX ADMIN — IOHEXOL 60 ML: 350 INJECTION, SOLUTION INTRAVENOUS at 10:00

## 2018-01-01 RX ADMIN — ATROPINE SULFATE 2 DROP: 10 SOLUTION/ DROPS OPHTHALMIC at 23:28

## 2018-01-01 RX ADMIN — PROCHLORPERAZINE EDISYLATE 10 MG: 5 INJECTION INTRAMUSCULAR; INTRAVENOUS at 06:15

## 2018-01-01 RX ADMIN — LORAZEPAM 0.5 MG: 2 INJECTION INTRAMUSCULAR; INTRAVENOUS at 23:22

## 2018-01-01 RX ADMIN — LORAZEPAM 1 MG: 2 SOLUTION, CONCENTRATE ORAL at 14:46

## 2018-01-01 RX ADMIN — HEPARIN SODIUM 5000 UNITS: 5000 INJECTION, SOLUTION INTRAVENOUS; SUBCUTANEOUS at 06:09

## 2018-01-01 RX ADMIN — OXYCODONE HYDROCHLORIDE 5 MG: 5 TABLET ORAL at 09:55

## 2018-01-01 RX ADMIN — DIPHENHYDRAMINE HCL 25 MG: 25 TABLET ORAL at 08:12

## 2018-01-01 RX ADMIN — OXYCODONE HYDROCHLORIDE 5 MG: 5 TABLET ORAL at 02:49

## 2018-01-01 RX ADMIN — ATROPINE SULFATE 2 DROP: 10 SOLUTION/ DROPS OPHTHALMIC at 16:23

## 2018-01-01 RX ADMIN — HEPARIN 500 UNITS: 100 SYRINGE at 15:15

## 2018-01-01 ASSESSMENT — ENCOUNTER SYMPTOMS
CLAUDICATION: 0
PHOTOPHOBIA: 0
ORTHOPNEA: 0
SENSORY CHANGE: 0
HEMOPTYSIS: 0
NAUSEA: 1
HEADACHES: 0
EYE DISCHARGE: 0
NECK PAIN: 0
TINGLING: 0
CHILLS: 0
TREMORS: 0
NECK PAIN: 0
NAUSEA: 1
ORTHOPNEA: 0
BACK PAIN: 0
TREMORS: 0
CHILLS: 0
ORTHOPNEA: 0
DIAPHORESIS: 0
HEMOPTYSIS: 0
CLAUDICATION: 0
SPUTUM PRODUCTION: 0
PALPITATIONS: 0
TINGLING: 0
VOMITING: 1
DIZZINESS: 0
BLURRED VISION: 0
SPUTUM PRODUCTION: 0
FEVER: 0
PHOTOPHOBIA: 0
CHILLS: 0
EYE PAIN: 0
COUGH: 0
MYALGIAS: 0
MYALGIAS: 0
ABDOMINAL PAIN: 1
NECK PAIN: 0
EYE DISCHARGE: 0
VOMITING: 1
SENSORY CHANGE: 0
ABDOMINAL PAIN: 1
ABDOMINAL PAIN: 1
SHORTNESS OF BREATH: 0
DIAPHORESIS: 0
DOUBLE VISION: 0
SHORTNESS OF BREATH: 0
HEMOPTYSIS: 0
SPUTUM PRODUCTION: 0
TINGLING: 0
PHOTOPHOBIA: 0
MYALGIAS: 0
NAUSEA: 1
VOMITING: 1
DIAPHORESIS: 0
EYE PAIN: 0

## 2018-01-01 ASSESSMENT — PAIN SCALES - GENERAL
PAINLEVEL_OUTOF10: 0
PAINLEVEL_OUTOF10: 3
PAINLEVEL_OUTOF10: 0
PAINLEVEL_OUTOF10: 3
PAINLEVEL_OUTOF10: 0
PAINLEVEL_OUTOF10: 8
PAINLEVEL_OUTOF10: 4
PAINLEVEL_OUTOF10: 2
PAINLEVEL_OUTOF10: 0
PAINLEVEL_OUTOF10: 3
PAINLEVEL_OUTOF10: 0
PAINLEVEL_OUTOF10: 5
PAINLEVEL_OUTOF10: 0
PAINLEVEL_OUTOF10: 7
PAINLEVEL_OUTOF10: 0
PAINLEVEL_OUTOF10: 4
PAINLEVEL_OUTOF10: 0
PAINLEVEL_OUTOF10: 4
PAINLEVEL_OUTOF10: 0
PAINLEVEL_OUTOF10: 4
PAINLEVEL_OUTOF10: 4
PAINLEVEL_OUTOF10: 0
PAINLEVEL_OUTOF10: 4
PAINLEVEL_OUTOF10: 0

## 2018-01-01 ASSESSMENT — COGNITIVE AND FUNCTIONAL STATUS - GENERAL
WALKING IN HOSPITAL ROOM: A LITTLE
SUGGESTED CMS G CODE MODIFIER DAILY ACTIVITY: CH
DAILY ACTIVITIY SCORE: 24
MOBILITY SCORE: 21
CLIMB 3 TO 5 STEPS WITH RAILING: A LOT
SUGGESTED CMS G CODE MODIFIER MOBILITY: CJ

## 2018-01-01 ASSESSMENT — COPD QUESTIONNAIRES
COPD SCREENING SCORE: 3
DO YOU EVER COUGH UP ANY MUCUS OR PHLEGM?: NO/ONLY WITH OCCASIONAL COLDS OR INFECTIONS
COPD SCREENING SCORE: 2
IN THE PAST 12 MONTHS DO YOU DO LESS THAN YOU USED TO BECAUSE OF YOUR BREATHING PROBLEMS: DISAGREE/UNSURE
HAVE YOU SMOKED AT LEAST 100 CIGARETTES IN YOUR ENTIRE LIFE: NO/DON'T KNOW
DURING THE PAST 4 WEEKS HOW MUCH DID YOU FEEL SHORT OF BREATH: NONE/LITTLE OF THE TIME
DURING THE PAST 4 WEEKS HOW MUCH DID YOU FEEL SHORT OF BREATH: SOME OF THE TIME
HAVE YOU SMOKED AT LEAST 100 CIGARETTES IN YOUR ENTIRE LIFE: NO/DON'T KNOW
DO YOU EVER COUGH UP ANY MUCUS OR PHLEGM?: NO/ONLY WITH OCCASIONAL COLDS OR INFECTIONS

## 2018-01-01 ASSESSMENT — LIFESTYLE VARIABLES
DO YOU DRINK ALCOHOL: NO
ALCOHOL_USE: NO
EVER_SMOKED: NEVER
EVER_SMOKED: NEVER

## 2018-01-01 ASSESSMENT — PATIENT HEALTH QUESTIONNAIRE - PHQ9
2. FEELING DOWN, DEPRESSED, IRRITABLE, OR HOPELESS: NOT AT ALL
1. LITTLE INTEREST OR PLEASURE IN DOING THINGS: NOT AT ALL
SUM OF ALL RESPONSES TO PHQ9 QUESTIONS 1 AND 2: 0

## 2018-01-01 ASSESSMENT — PAIN DESCRIPTION - DESCRIPTORS: DESCRIPTORS: OTHER (COMMENT)

## 2018-01-10 NOTE — OR SURGEON
Immediate Post- Operative Note        PostOp Diagnosis: RLL MASS    Procedure(s): CT RT LUNG BX    Estimated Blood Loss: Less than 5 ml        Complications: None            1/10/2018     11:03 AM     Harjinder Clement

## 2018-01-10 NOTE — OR NURSING
1213    PATIENT RECEIVED FROM IR,  S/P RIGHT LUNG BX.  BX SITE WITH BAND-AID INTACT.  SITE IS CLEAR.  NO FAMILY AT BEDSIDE.  PATIENT RESTING AT THIS TIME.    1315   1ST PCXR DONE.     1330   1ST CXR IS BACK NEGATIVE OF ANY PNEUMOTHORAX.  PATIENT GIVEN DRINK AND SNACK.    1430   PATIENT RESTING WELL.      1500  2ND PCXR DONE.    1540  CXR IS BACK NEGATIVE OF ANY PNEUMOTHORAX.  DC INSTRUCTIONS GIVEN TO PATIENT AND FAMILY.  VERBALIZED UNDERSTANDING OF ALL. HL DC.  PATIENT DC TO HOME,  VIA WHEELCHAIR,  ESCORT OUT BY FAMILY,  REFUSED STAFF ESCORT.

## 2018-01-10 NOTE — PROGRESS NOTES
IR Procedure Note:    Site Marked and Confirmed with MD, patient and RN pre procedure.    Dr. Clement performed a CT guided biopsy of a right lung mass.  Cores x 4 taken.  The pt tolerated the procedure well; ETCo2 baseline 33, with consistent waveform during the procedure.  Tegaderm and gauze applied to right back, CDI and soft.  Pt alert, oriented and verbally appropriate post procedure, vital signs stable during procedure and transport, see flow sheet for vital signs.  Report given to TATYANA Baron.  RN transported pt to PPU with Sao2 monitor 97%.

## 2018-01-10 NOTE — DISCHARGE INSTRUCTIONS
ACTIVITY: Rest and take it easy for the first 24 hours.  A responsible adult is recommended to remain with you during that time.  It is normal to feel sleepy.  We encourage you to not do anything that requires balance, judgment or coordination.    MILD FLU-LIKE SYMPTOMS ARE NORMAL. YOU MAY EXPERIENCE GENERALIZED MUSCLE ACHES, THROAT IRRITATION, HEADACHE AND/OR SOME NAUSEA.    FOR 24 HOURS DO NOT:  Drive, operate machinery or run household appliances.  Drink beer or alcoholic beverages.   Make important decisions or sign legal documents.    SPECIAL INSTRUCTIONS: **KEEP INCISION DRY FOR 24 HRS*    DIET: To avoid nausea, slowly advance diet as tolerated, avoiding spicy or greasy foods for the first day.  Add more substantial food to your diet according to your physician's instructions.  Babies can be fed formula or breast milk as soon as they are hungry.  INCREASE FLUIDS AND FIBER TO AVOID CONSTIPATION.    SURGICAL DRESSING/BATHING: *MAY SHOWER TOMORROW AFTERNOON THEN MAY REMOVE BAND-AID**    FOLLOW-UP APPOINTMENT:  A follow-up appointment should be arranged with your doctor in **DR [facility]  [phone] *; call to schedule.    You should CALL YOUR PHYSICIAN if you develop:  Fever greater than 101 degrees F.  Pain not relieved by medication, or persistent nausea or vomiting.  Excessive bleeding (blood soaking through dressing) or unexpected drainage from the wound.  Extreme redness or swelling around the incision site, drainage of pus or foul smelling drainage.  Inability to urinate or empty your bladder within 8 hours.  Problems with breathing or chest pain.    You should call 911 if you develop problems with breathing or chest pain.  If you are unable to contact your doctor or surgical center, you should go to the nearest emergency room or urgent care center.  Physician's telephone #: *[phone]**    If any questions arise, call your doctor.  If your doctor is not available, please feel free to call the Surgical Center at  [phone]  The Center is open Monday through Friday from 7AM to 7PM.  You can also call the HEALTH HOTLINE open 24 hours/day, 7 days/week and speak to a nurse at [phone], or toll free at [phone].    A registered nurse may call you a few days after your surgery to see how you are doing after your procedure.    MEDICATIONS: Resume taking daily medication.  Take prescribed pain medication with food.  If no medication is prescribed, you may take non-aspirin pain medication if needed.  PAIN MEDICATION CAN BE VERY CONSTIPATING.  Take a stool softener or laxative such as senokot, pericolace, or milk of magnesia if needed.      If your physician has prescribed pain medication that includes Acetaminophen (Tylenol), do not take additional Acetaminophen (Tylenol) while taking the prescribed medication.    Depression / Suicide Risk    As you are discharged from this [facility] facility, it is important to learn how to keep safe from harming yourself.    Recognize the warning signs:  · Abrupt changes in personality, positive or negative- including increase in energy   · Giving away possessions  · Change in eating patterns- significant weight changes-  positive or negative  · Change in sleeping patterns- unable to sleep or sleeping all the time   · Unwillingness or inability to communicate  · Depression  · Unusual sadness, discouragement and loneliness  · Talk of wanting to die  · Neglect of personal appearance   · Rebelliousness- reckless behavior  · Withdrawal from people/activities they love  · Confusion- inability to concentrate     If you or a loved one observes any of these behaviors or has concerns about self-harm, here's what you can do:  · Talk about it- your feelings and reasons for harming yourself  · Remove any means that you might use to hurt yourself (examples: pills, rope, extension cords, firearm)  · Get professional help from the community (Mental Health, Substance Abuse, psychological  counseling)  · Do not be alone:Call your Safe Contact- someone whom you trust who will be there for you.  · Call your local CRISIS HOTLINE 942-7259 or 733-991-1991  · Call your local Children's Mobile Crisis Response Team [place] (322) 333-8882 or www.[employer]  · Call the toll free National Suicide Prevention Hotlines   · National Suicide Prevention Lifeline 546-686-WXFU (2915)  · National Hope Line Network 800-SUICIDE (930-9668)

## 2018-02-26 NOTE — PROGRESS NOTES
Date of Service:  2/26/2018    Reason for Consult: Pulmonary coccidioidomycosis     Referring Physician:  Clay Frank MD    HPI:   Ms. Mcleod is a 62 yo woman with a history of metastatic breast cancer and endometrial cancer who presents to the ID clinic as a new pt referral from Dr. Frank for further evaluation of recently diagnosed pulm cocci. Pt was originally dx with breast cancer in 2012 s/p left partial mastectomy and axillary dissection. She underwent chemoXRT. She developed back pain in Dec 2015 and imaging revealed a pathological fracture and diffuse metastasis. She had back surgery on 12/08/16 and path confirmed metastatic breast cancer. Recently she underwent a CT chest and it revealed a pulmonary nodule which was new. Nodule was biopsied on 01/10/18 and it was negative for cancer but path showed with cocci. She was started on fluconazole which she is tolerating. She denies any fevers, chills, SOB, or cough.     ROS: All other ROS were reviewed and are negative except as noted above in the HPI.     Past Medical History:   Diagnosis Date   • Anemia    • Arthritis     lower back   • Bronchitis     over 30 yrs ago   • Cancer (CMS-HCC) 3/2006    endometrial, left breast CA   • Cancer (CMS-HCC) 2012    breast   • Hypertension    • Other specified symptom associated with female genital organs 3/2006    endometrial ca   Metastatic breast cancer    Past Surgical History:   Procedure Laterality Date   • THORACIC FUSION O-ARM  12/8/2015    Procedure: THORACIC FUSION O-ARM T10-L4 INSTRUMENTED FOR METASTATIC CORD COMPRESSION;  Surgeon: Safia Orr M.D.;  Location: [facility];  Service:    • LUMBAR LAMINECTOMY DISKECTOMY  12/8/2015    Procedure: LUMBAR LAMINECTOMY DISKECTOMY FOR L1 VERTEBRECTOMY AND POSTERIOR DECOMPRESSION T12-L2 ;  Surgeon: Safia Orr M.D.;  Location: [facility];  Service:    • MASTECTOMY  12/27/2012    Performed by Tricia Fragoso M.D. at [facility]  ORS   • NODE BIOPSY SENTINEL  12/27/2012    Performed by Tricia Fragoso M.D. at [facility] ORS   • CATH REMOVAL  12/27/2012    Performed by Tricia Fragoso M.D. at SUR[facility]ERY [facility] ORS   • CATH PLACEMENT  8/30/2012    Performed by TRICIA FRAGOSO at SUR[facility]ERY [facility] ORS       Family history   Father with prostate and pancreatic cancer  Maternal side with breast cancer and ovarian cancer    Social History     Social History   • Marital status:      Spouse name: N/A   • Number of children: N/A   • Years of education: N/A     Occupational History   • Not on file.     Social History Main Topics   • Smoking status: Never Smoker   • Smokeless tobacco: Never Used   • Alcohol use Yes      Comment: 4-5 glasses wine per day,      12/7/2015   35 per week   • Drug use: No   • Sexual activity: Not on file     Other Topics Concern   • Not on file     Social History Narrative   • No narrative on file     History   Smoking Status   • Never Smoker   Smokeless Tobacco   • Never Used     History   Alcohol Use   • Yes     Comment: 4-5 glasses wine per day,      12/7/2015   35 per week         Allergies   Allergen Reactions   • Other Food Vomiting     Sea scallops  RXN=ongoing         Other Current Medications:  Current Outpatient Prescriptions on File Prior to Visit   Medication Sig Dispense Refill   • Pyridoxine HCl (VITAMIN B6 PO) Take 2,000 mg by mouth every day.     • Cholecalciferol (VITAMIN D) 2000 UNITS Cap Take 1 Cap by mouth every day. gelcap     • capecitabine (XELODA) 500 MG tablet Take 1,250 mg/m2 by mouth 2 Times a Day. Pt takes 3 tabs q 2 hours     • Cyanocobalamin (VITAMIN B 12 PO) Take 1 Tab by mouth every day.     • Biotin 1000 MCG TABS Take 1 Tab by mouth every day.     • calcium carbonate-vitamin D (OSCAL 500/200 D-3) 500-200 MG-UNIT TABS Take 1 Tab by mouth 2 Times a Day.     • docosahexanoic acid (OMEGA 3 FA) 1000 MG CAPS Take 1,000 mg by mouth 2 Times a Day.       No current  "facility-administered medications on file prior to visit.        History reviewed with the patient and medical records    Most Recent Vital Signs:  /70   Pulse 92   Temp 36.9 °C (98.4 °F)   Ht 1.6 m (5' 3\")   Wt 62.3 kg (137 lb 6.4 oz)   SpO2 97%   BMI 24.34 kg/m²   Physical Exam:  General: thin, but well-appearing, in no acute distress  HEENT: sclera anicteric, MMM, no lymphadenopathy, no oral lesions  Neck: supple, no LAD  Chest: CTAB, normal work of breathing, no rubs/rales/wheezing  Cardiac: RRR, normal S1 S2, no m/r/g   Abdomen: +bowel sounds, soft, non-tender, non-distended  Extremities: no edema, WWP  Skin: no rashes or worrisome lesions  Neuro: Alert and oriented to person, place and time, non-focal, moves all extremities    Pertinent Lab Results:    Lab Results   Component Value Date/Time    WBC 3.2 (L) 02/20/2018 10:32 AM    RBC 3.78 (L) 02/20/2018 10:32 AM    HEMOGLOBIN 12.9 02/20/2018 10:32 AM    HEMATOCRIT 38.6 02/20/2018 10:32 AM    .1 (H) 02/20/2018 10:32 AM    MCH 34.1 (H) 02/20/2018 10:32 AM    MCHC 33.4 (L) 02/20/2018 10:32 AM    MPV 9.8 02/20/2018 10:32 AM    NEUTSPOLYS 68.40 02/20/2018 10:32 AM    LYMPHOCYTES 15.50 (L) 02/20/2018 10:32 AM    MONOCYTES 12.30 02/20/2018 10:32 AM    EOSINOPHILS 2.50 02/20/2018 10:32 AM    BASOPHILS 1.30 02/20/2018 10:32 AM    HYPOCHROMIA 2+ 03/04/2006 08:20 AM    ANISOCYTOSIS 1+ 09/26/2017 09:48 AM          Lab Results   Component Value Date/Time    SODIUM 139 02/20/2018 10:27 AM    POTASSIUM 4.1 02/20/2018 10:27 AM    CHLORIDE 105 02/20/2018 10:27 AM    CO2 24 02/20/2018 10:27 AM    GLUCOSE 87 02/20/2018 10:27 AM    BUN 14 02/20/2018 10:27 AM    CREATININE 0.81 02/20/2018 10:27 AM    CREATININE 0.7 03/03/2006 03:45 AM        Microbiology:  none      Studies:  CT chest/a/p Dec 2017  Impression       1.  New 2 cm ill-defined mass in the medial aspect superior segment RIGHT lower lobe, most likely neoplasm.  Focal inflammatory process is also a " consideration.  2.  Diffuse bony metastatic disease, as seen previously.  3.  No intra-abdominal mass or adenopathy.         Impression/Plan:     1. Coccidioidomycosis  COCCI AB PANEL, SERUM   2. Malignant neoplasm of lower-outer quadrant of left breast of female, estrogen receptor positive (CMS-HCC)       Pulm cocci diagnosed by lung biopsy in Jan 2017  Will check cocci titers  Pt currently asymptomatic  Continue fluc 400 mg daily - pt tolerating. Anticipate 1 year treatment  FU with oncology - pt states she has standing CTs every 6 months    RV: 2 months. FU with PCP for chronic medical problems    Gail Breaux M.D.    2/26/2018

## 2018-04-26 NOTE — PROGRESS NOTES
Infectious Disease Clinic    Subjective:     Chief Complaint   Patient presents with   • Follow-Up     Pulmonary cocci     This is my first time meeting Ms. Mcleod.     Interval History: 64 yo woman with a history of metastatic breast cancer and endometrial cancer.  Was referred to ID from Dr. Frank for further evaluation of diagnosed pulm cocci. Pt was originally dx with breast cancer in 2012 s/p left partial mastectomy and axillary dissection. She underwent chemoXRT. She developed back pain in Dec 2015 and imaging revealed a pathological fracture and diffuse metastasis. She had back surgery on 12/08/16 and path confirmed metastatic breast cancer. Underwent a CT chest December 2017 and it revealed a pulmonary nodule which was new. Nodule was biopsied on 01/10/18 and it was negative for cancer but path showed with cocci. She was started on fluconazole at that time.     2/26/18:  Seen by Dr. Breaux as a new consult for Pulmonary Cocci.  Cocci titers checked.  Pt asymptomatic.  Continue PO Fluc 400mg daily, which she initially started in January- anticipate 1 year of treatment.     Records reviewed    Today, 4/26/2018: Patient reports feeling well overall and has been tolerating the PO Fluconazole with minimal adverse effect.  Had nausea with vomiting the last 2 nights, otherwise denies any n/v previously.  Started taking the Afintor (chemo) yesterday morning.  Denies feeling generally ill, fevers/chills, general malaise, diarrhea, abdominal pain, chest pain or shortness of breath.  Has mild HAs from time to time that resolve on their own.  Denies any new numbness or tingling in hands or feet.  Due for another CT and body scan in June per Dr. Frank.       ROS  As documented above in my HPI.    Past Medical History:   Diagnosis Date   • Anemia    • Arthritis     lower back   • Bronchitis     over 30 yrs ago   • Cancer (CMS-HCC) 3/2006    endometrial, left breast CA   • Cancer (CMS-HCC) 2012    breast   • Hypertension  "   • Other specified symptom associated with female genital organs 3/2006    endometrial ca       Social History   Substance Use Topics   • Smoking status: Never Smoker   • Smokeless tobacco: Never Used   • Alcohol use Yes      Comment: 4-5 glasses wine per day,      12/7/2015   35 per week       Allergies: Other food    Pt's medication and problem list reviewed.     Objective:     PE:  /80   Pulse 87   Temp 37.3 °C (99.1 °F)   Resp 16   Ht 1.6 m (5' 3\")   Wt 52.6 kg (116 lb)   SpO2 100%   BMI 20.55 kg/m²     Vital signs reviewed    Constitutional: Appears well-developed and well-nourished. No acute distress.  Speech fluent.  Thin.   Eyes: Conjunctivae normal and EOM are normal. Pupils are equal, round, and reactive to light.   Neck: Trachea midline. Normal range of motion.  No JVD.  Cardiovascular: Normal rate, regular rhythm, normal heart sounds. No murmur, gallop, or friction rub. No edema.  Respiratory: No respiratory distress, unlabored respiratory effort.  Lungs clear to auscultation bilaterally. No wheezes or rales.   Abdomen: Soft, non tender, non-distended. BS + x 4. No masses.   Musculoskeletal: Steady gait.  Normal range of motion.  No joint or bone tenderness, swelling, erythema or deformity.    Skin: Warm and dry. No visible rashes or lesions.  Neurological: No cranial nerve deficit. Coordination normal.   Psychiatric: Alert and oriented to person, place, and time. Normal mood, calm affect.  Normal behavior and judgment.     Labs:  WBC   Date/Time Value Ref Range Status   04/24/2018 09:22 AM 3.7 (L) 4.8 - 10.8 K/uL Final     RBC   Date/Time Value Ref Range Status   04/24/2018 09:22 AM 3.64 (L) 4.20 - 5.40 M/uL Final     Hemoglobin   Date/Time Value Ref Range Status   04/24/2018 09:22 AM 12.0 12.0 - 16.0 g/dL Final     Hematocrit   Date/Time Value Ref Range Status   04/24/2018 09:22 AM 36.3 (L) 37.0 - 47.0 % Final     MCV   Date/Time Value Ref Range Status   04/24/2018 09:22 AM 99.7 (H) 81.4 " - 97.8 fL Final     MCH   Date/Time Value Ref Range Status   04/24/2018 09:22 AM 33.0 27.0 - 33.0 pg Final     MCHC   Date/Time Value Ref Range Status   04/24/2018 09:22 AM 33.1 (L) 33.6 - 35.0 g/dL Final     MPV   Date/Time Value Ref Range Status   04/24/2018 09:22 AM 9.5 9.0 - 12.9 fL Final        Sodium   Date/Time Value Ref Range Status   04/24/2018 09:22  135 - 145 mmol/L Final     Potassium   Date/Time Value Ref Range Status   04/24/2018 09:22 AM 4.2 3.6 - 5.5 mmol/L Final     Chloride   Date/Time Value Ref Range Status   04/24/2018 09:22  96 - 112 mmol/L Final     Co2   Date/Time Value Ref Range Status   04/24/2018 09:22 AM 26 20 - 33 mmol/L Final     Glucose   Date/Time Value Ref Range Status   04/24/2018 09:22 AM 95 65 - 99 mg/dL Final     Bun   Date/Time Value Ref Range Status   04/24/2018 09:22 AM 20 8 - 22 mg/dL Final     Creatinine   Date/Time Value Ref Range Status   04/24/2018 09:22 AM 0.95 0.50 - 1.40 mg/dL Final   03/03/2006 03:45 AM 0.7 0.5 - 1.4 mg/dL Final       Alkaline Phosphatase   Date/Time Value Ref Range Status   04/24/2018 09:22  (H) 30 - 99 U/L Final     AST(SGOT)   Date/Time Value Ref Range Status   04/24/2018 09:22 AM 58 (H) 12 - 45 U/L Final     ALT(SGPT)   Date/Time Value Ref Range Status   04/24/2018 09:22 AM 17 2 - 50 U/L Final     Total Bilirubin   Date/Time Value Ref Range Status   04/24/2018 09:22 AM 0.6 0.1 - 1.5 mg/dL Final      Assessment and Plan:   The following treatment plan was discussed with patient at length:    1. Coccidioidomycosis  fluconazole (DIFLUCAN) 100 MG Tab    -Continue PO Fluconazole as directed, estimated end date January 2019.  -Labs being monitored by Dr. Frank, will review.  -CT every 6 months per Dr. Frank, will review.    2. Malignant neoplasm of lower-outer quadrant of left breast of female, estrogen receptor positive (CMS-HCC)      FU with Dr. Frank as directed.  Afintor as directed.      Follow up: 6 months, RTC sooner if needed.  FU with PCP for ongoing chronic medical conditions.     GRAHAM Blackwood.

## 2018-08-31 NOTE — DISCHARGE INSTRUCTIONS
ACTIVITY: Rest and take it easy for the first 24 hours.  A responsible adult is recommended to remain with you during that time.  It is normal to feel sleepy.  We encourage you to not do anything that requires balance, judgment or coordination.    MILD FLU-LIKE SYMPTOMS ARE NORMAL. YOU MAY EXPERIENCE GENERALIZED MUSCLE ACHES, THROAT IRRITATION, HEADACHE AND/OR SOME NAUSEA.    FOR 24 HOURS DO NOT:  Drive, operate machinery or run household appliances.  Drink beer or alcoholic beverages.   Make important decisions or sign legal documents.    SPECIAL INSTRUCTIONS:     DIET: Upon discharge from the hospital you may resume your normal preoperative diet. Depending on how you are feeling and whether you have nausea or not, you may wish to stay with a bland diet for the first few days. However, you can advance this as quickly as you feel ready.    ACTIVITIES: After discharge from the hospital, you may resume full routine activities. However, there should be no heavy lifting (greater than 15 pounds) and no strenuous activities until after your follow-up visit. Otherwise, routine activities of daily living are acceptable.    DRIVING: If you drive, you may drive whenever you are off pain medications and are able to perform the activities needed to drive, i.e. turning, bending, twisting, etc.    BATHING: You may get the wound wet at any time after leaving the hospital. You may shower, but do not submerge in a bath for at least a week. Dressings may come off after 48 hours.    PAIN MEDICATION: You will be given a prescription for pain medication at discharge. Please take these as directed. It is important to remember not to take medications on an empty stomach as this may cause nausea.    CALL IF YOU HAVE: (1) Fevers to more than 1010 F, (2) Unusual chest or leg pain, (3) Drainage or fluid from incision that may be foul smelling, increased tenderness or soreness at the wound or the wound edges are no longer together, redness  or swelling at the incision site. Please do not hesitate to call with any other questions.     APPOINTMENT: Contact our office at [phone] for a follow-up appointment in 1 week following your procedure.    If you have any additional questions, please do not hesitate to call the office.     Office address:  [address], [address] [place] [address]    DIET: To avoid nausea, slowly advance diet as tolerated, avoiding spicy or greasy foods for the first day.  Add more substantial food to your diet according to your physician's instructions.  Babies can be fed formula or breast milk as soon as they are hungry.  INCREASE FLUIDS AND FIBER TO AVOID CONSTIPATION.    SURGICAL DRESSING/BATHING: may remove dressing on 9/2    FOLLOW-UP APPOINTMENT:  A follow-up appointment should be arranged with your doctor; call to schedule.    You should CALL YOUR PHYSICIAN if you develop:  Fever greater than 101 degrees F.  Pain not relieved by medication, or persistent nausea or vomiting.  Excessive bleeding (blood soaking through dressing) or unexpected drainage from the wound.  Extreme redness or swelling around the incision site, drainage of pus or foul smelling drainage.  Inability to urinate or empty your bladder within 8 hours.  Problems with breathing or chest pain.    You should call 911 if you develop problems with breathing or chest pain.  If you are unable to contact your doctor or surgical center, you should go to the nearest emergency room or urgent care center.  Physician's telephone #: [phone]    If any questions arise, call your doctor.  If your doctor is not available, please feel free to call the Surgical Center at [phone].  The Center is open Monday through Friday from 7AM to 7PM.  You can also call the [facility] HOTLINE open 24 hours/day, 7 days/week and speak to a nurse at [phone], or toll free at [phone].    A registered nurse may call you a few days after your surgery to see how you are  doing after your procedure.    MEDICATIONS: Resume taking daily medication.  Take prescribed pain medication with food.  If no medication is prescribed, you may take non-aspirin pain medication if needed.  PAIN MEDICATION CAN BE VERY CONSTIPATING.  Take a stool softener or laxative such as senokot, pericolace, or milk of magnesia if needed.    Prescription given for norco.  Last pain medication given at _________.    If your physician has prescribed pain medication that includes Acetaminophen (Tylenol), do not take additional Acetaminophen (Tylenol) while taking the prescribed medication.    Depression / Suicide Risk    As you are discharged from this [facility] facility, it is important to learn how to keep safe from harming yourself.    Recognize the warning signs:  · Abrupt changes in personality, positive or negative- including increase in energy   · Giving away possessions  · Change in eating patterns- significant weight changes-  positive or negative  · Change in sleeping patterns- unable to sleep or sleeping all the time   · Unwillingness or inability to communicate  · Depression  · Unusual sadness, discouragement and loneliness  · Talk of wanting to die  · Neglect of personal appearance   · Rebelliousness- reckless behavior  · Withdrawal from people/activities they love  · Confusion- inability to concentrate     If you or a loved one observes any of these behaviors or has concerns about self-harm, here's what you can do:  · Talk about it- your feelings and reasons for harming yourself  · Remove any means that you might use to hurt yourself (examples: pills, rope, extension cords, firearm)  · Get professional help from the community (Mental Health, Substance Abuse, psychological counseling)  · Do not be alone:Call your Safe Contact- someone whom you trust who will be there for you.  · Call your local CRISIS HOTLINE 862-7115 or 218-971-1906  · Call your local Children's Mobile Crisis Response Team [place] (699) 694-9294 or www.[employer]  · Call the toll free National Suicide Prevention Hotlines   · National Suicide Prevention Lifeline 371-196-QUKU (4082)  · National ROSTR Line Network 800-SUICIDE (275-9891)

## 2018-08-31 NOTE — OP REPORT
DATE OF SERVICE:  08/31/2018    PREOPERATIVE DIAGNOSIS:  Recurrent breast cancer.    POSTOPERATIVE DIAGNOSIS:  Recurrent breast cancer.    PROCEDURE:  Port-A-Cath placement with an 8-Djiboutian catheter in the right   subclavian vein.    SURGEON:  Tricia Fragoso MD    ANESTHESIA:  Laryngeal mask.    ANESTHESIOLOGIST:  Steve Emmanuel MD    INDICATIONS:  The patient is a 63-year-old female who has recurrent breast   cancer.  She is in need of a port for chemotherapy.    FINDINGS:  Port was placed in the right subclavian vein and it was noted in   the superior vena cava-right atrial junction and flushed and marvin easily.    DESCRIPTION OF PROCEDURE:  After the patient was identified and consented, she   was brought to the operating room and was placed in supine position.  Patient   underwent laryngeal mask anesthetic clearance.  Patient's chest was prepped   in sterile fashion.  An 18-gauge thin-walled needle was introduced into the   right subclavian vein, wire was passed under fluoroscopy, it was noted to be   in the right ventricle.  Anterior chest wall was anesthetized with 0.25%   Marcaine.  The old port site was reopened using electrocautery, subcutaneous   tissue was dissected and subcutaneous port was formed.  Once the port was   brought in and sewn in with 2-0 Prolene, catheter was brought to the insertion   site, and then the catheter was cut to fit the patient.  Dilator and sheath   were passed on the wire and the wire and dilator were removed.  The catheter   was passed on the peel-away sheath, which was also removed.  Catheter was   flushed and marvin easily on fluoroscopy.  Tip was noted in the superior vena   cava-right atrial junction.  The port site was closed with 3-0 Vicryl,   subcutaneous layer and skin were closed with running 4-0 in an interrupted   fashion.  It was not left accessed.  Op-Site dressing was placed on the   wounds.  Patient was extubated and was taken to recovery in stable  condition.    All sponge and needle counts were correct.       ____________________________________     MD EL NAZARIO / HEATHER    DD:  08/31/2018 13:14:55  DT:  08/31/2018 14:23:01    D#:  [id number]  Job#:  [id number]

## 2018-08-31 NOTE — OR NURSING
1320 Patient arrived from OR on [facility]. Report received from anesthesia and RN.LMA in place. Will continue to monitor.   1325 LMA out, pt woke up  1327 Xray at bedside  1340 Daughter at bedside  1410 Discharge instructions discussed with patient and family, copy given. Patient verbalized understanding to instructions. Precription with daughter .  Belongings with patient.   1424 Discharge criteria met. PIV out, Discharged via ambulatory with daughter .

## 2018-10-11 NOTE — PROGRESS NOTES
RADIATION ONCOLOGY FOLLOW-UP    DATE OF SERVICE: 10/11/2018    IDENTIFICATION:   A 61-year-old female with history of a ypT2 N1a M0, stage II   B infiltrating ductal carcinoma, left breast, receptor positive, HER-2   negative.  She is status post neoadjuvant dose-dense AC Taxol followed by   partial mastectomy, on 12/27/2012 followed by adjuvant radiotherapy to the   left breast, completed 03/27/2015.  She also has a history of stage IB   endometrial adenocarcinoma.     She developed interval osseous disease with cord compression at L1 level.  She underwent decompressive laminectomy followed by palliative radiotherapy.  the thoracic and lumbar spine for painful metastasis.  She received 25 Gy in 5 fractions completed 01/20/2016.    HISTORY OF PRESENT ILLNESS:   In the interim patient is being followed by Dr. Frank and has been receiving systemic therapy.  She had a course of Faslodex, Xgeva and Ibrance from January 2016 to June 2016.  This was discontinued and Xeloda was started in August 2016.  And continued until April 2018 when it was changed to Afinitor. She had cross-sectional imaging in July 2018 that demonstrated interval development of 3 small lesions within the liver too small to characterize but suspicious for metastatic disease.  More recently Afinitor has been discontinued and patient is started on single agent Gemzar 2 weeks on 1 week off.      She has been complaining of generalized aches and pains which primarily get worse in the evenings.  Most painful areas described as an ache are her left femur and hip her right hip and femur in her right humeral region.  Patient states that pain is approximately 4-5 on a scale of 0-10.  There is no radiation of pain.  Pain does keep her up at night.  Bone scan was obtained demonstrating uptake in the left femur right femur and right humeral region.  Plain x-rays of the left hip did show metastatic disease.  X-rays of the right humeral region did not show  metastatic disease however bone scan is positive in this area and she is having pain.    Patient states that she has not been taking narcotics for her pain control because of the adverse effects from the narcotics.  Primarily they affect her thinking.  She is trying to manage her pain with over-the-counter medicines which she says is not very effective.    CURRENT MEDICATIONS:  Current Outpatient Prescriptions   Medication Sig Dispense Refill   • Gemcitabine HCl (GEMZAR IV) by Intravenous route.     • therapeutic multivitamin-minerals (THERAGRAN-M) Tab Take 1 Tab by mouth every day.     • Biotin 5000 MCG Cap Take 1 Tab by mouth.     • HYDROcodone-acetaminophen (NORCO) 5-325 MG Tab per tablet Take 1-2 Tabs by mouth every four hours as needed.     • fluconazole (DIFLUCAN) 200 MG Tab Take 400 mg by mouth every day.     • Pyridoxine HCl (VITAMIN B6 PO) Take 2,000 mg by mouth every day.     • Cholecalciferol (VITAMIN D) 2000 UNITS Cap Take 1 Cap by mouth every day. gelcap     • Cyanocobalamin (VITAMIN B 12 PO) Take 1 Tab by mouth every day.     • calcium carbonate-vitamin D (OSCAL 500/200 D-3) 500-200 MG-UNIT TABS Take 1 Tab by mouth 2 Times a Day.     • docosahexanoic acid (OMEGA 3 FA) 1000 MG CAPS Take 1,000 mg by mouth 2 Times a Day.       No current facility-administered medications for this encounter.        ALLERGIES:  Other food    REVIEW OF SYSTEMS:  A review of systems for today's date of service was reviewed and uploaded into the electronic medical record.    PHYSICAL EXAM:   /65   Pulse 99   Temp 36.8 °C (98.3 °F)   Wt 45.5 kg (100 lb 4.8 oz)   SpO2 100%   BMI 17.77 kg/m²   GENERAL: Thin appearing, alert oriented no acute distress  HEENT:  Pupils are equal, round, and reactive to light.  Extraocular muscles   are intact. Sclerae nonicteric.  Conjunctivae pink.  Oral cavity, tongue   protrudes midline.   NECK:  Supple without evidence of thyromegaly.  NODES:  No peripheral adenopathy of the neck,  supraclavicular fossa or axillae   bilaterally.  LUNGS:  Clear to ascultation and resonant to percussion.  HEART:  Regular rate and rhythm.  No murmur appreciated  ABDOMEN:  Soft. No evidence of hepatosplenomegaly.  Positive bowel sounds.  EXTREMITIES:  Without Edema.  MUSCULOSKELETAL: Moderate tenderness to palpation of the left hip and proximal femur.  No significant tenderness at the present time to palpation of the right hip and right humerus.  No tenderness to internal or external rotation of the hip bilaterally.  NEUROLOGIC:  Cranial nerves II through XII were intact.  Strength is 5/5 in   lower extremities bilaterally.  DTRs were symmetrical.  There was no focal   sensory deficit appreciated.    Pain Scale: 0-10  Pain Assessement: 0  Pain Location, Orientation and Scale: no complaints of pain at this time  Last reported pain to left hip/thigh on 10/9/18. Patient said it is intermittent pain.    LABORATORY DATA:   Lab Results   Component Value Date/Time    SODIUM 139 09/17/2018 11:00 AM    POTASSIUM 4.4 09/17/2018 11:00 AM    CHLORIDE 102 09/17/2018 11:00 AM    CO2 24 09/17/2018 11:00 AM    GLUCOSE 176 (H) 09/17/2018 11:00 AM    BUN 27 (H) 09/17/2018 11:00 AM    CREATININE 1.00 09/17/2018 11:00 AM    CREATININE 0.7 03/03/2006 03:45 AM     Lab Results   Component Value Date/Time    ALKPHOSPHAT 100 (H) 09/17/2018 11:00 AM    ASTSGOT 78 (H) 09/17/2018 11:00 AM    ALTSGPT 15 09/17/2018 11:00 AM    TBILIRUBIN 0.3 09/17/2018 11:00 AM      Lab Results   Component Value Date/Time    WBC 5.4 08/28/2018 09:19 AM    RBC 3.90 (L) 08/28/2018 09:19 AM    HEMOGLOBIN 11.0 (L) 08/28/2018 09:19 AM    HEMATOCRIT 34.8 (L) 08/28/2018 09:19 AM    MCV 89.2 08/28/2018 09:19 AM    MCH 28.2 08/28/2018 09:19 AM    MCHC 31.6 (L) 08/28/2018 09:19 AM    MPV 9.9 08/28/2018 09:19 AM    NEUTSPOLYS 71.90 08/28/2018 09:19 AM    LYMPHOCYTES 13.20 (L) 08/28/2018 09:19 AM    MONOCYTES 12.70 08/28/2018 09:19 AM    EOSINOPHILS 1.10 08/28/2018 09:19  AM    BASOPHILS 0.70 08/28/2018 09:19 AM    HYPOCHROMIA 2+ 03/04/2006 08:20 AM    ANISOCYTOSIS 1+ 09/26/2017 09:48 AM        RADIOLOGY DATA:  Dx-humerus 2+ Right    Result Date: 9/28/2018 9/28/2018 12:36 PM HISTORY/REASON FOR EXAM:  Right arm pain, metastatic breast cancer. TECHNIQUE/EXAM DESCRIPTION AND NUMBER OF VIEWS:  2 views of the RIGHT humerus. COMPARISON: None of the right humerus. CT chest 7/16/2018 FINDINGS: There are sclerotic foci in the right scapula and ribs compatible metastatic disease. Right humerus is normal in appearance. Right shoulder and right elbow joints appear normal There is no fracture. There is no malalignment. No degenerative changes are present.     Negative right humeral series    Nm-bone Scan Whole Body    Result Date: 9/28/2018 9/28/2018 1:05 PM HISTORY/REASON FOR EXAM:  Breast cancer TECHNIQUE/EXAM DESCRIPTION AND NUMBER OF VIEWS: Radionuclide whole body bone scan. COMPARISON: 12/19/2017 and scan; 7/16/2018 chest CT PROCEDURE:  26 mCi of Tc 99m-MDP was administered intravenously followed by delayed whole body planar imaging. FINDINGS: There is increased radiotracer accumulation in the cervical and thoracic spine compared to the prior study. There are scattered foci of increased radiotracer activity in the BILATERAL ribs, increased since the prior study. There is increased radiotracer activity in the sternum, similar to the prior study. There is increased radiotracer activity at L4, the remainder of the lumbar spine and in the sacrum bilaterally, similar to the prior study. There is increased radiotracer activity in the proximal femur on each side similar to the prior study. The previously demonstrated focus of radiotracer attenuation in the mid LEFT femoral diaphysis is no longer seen. There is a new focus of subtle radiotracer accumulation in the RIGHT humeral diaphysis. There is increased radiotracer activity in the BILATERAL anterior pelvis. This is similar to the prior study.  There is symmetric renal excretion of tracer.  No abnormal soft tissue accumulation is present.     Progression of osseous metastatic disease since the prior bone scan    Dx-hip-complete - Unilateral 2+ Left    Result Date: 9/28/2018 9/28/2018 12:36 PM HISTORY/REASON FOR EXAM:  Left hip pain. Metastatic breast cancer TECHNIQUE/EXAM DESCRIPTION AND NUMBER OF VIEWS:  2 views of the LEFT hip. COMPARISON: CT abdomen and pelvis 7/16/2018 and left femur x-ray 9/28/2018 FINDINGS: The bony pelvis is intact. There are diffuse sclerotic lesions throughout the bony, both proximal femur, lumbar spine compatible with metastatic disease. There are no fracture. There is probably mild osteoarthritis of both hip joint. There are no fracture or malalignment. The sacroiliac joints are normal in appearance. There is facet arthropathy of the lower lumbar spine. The visualized portions of the abdomen are within normal limits.     1.  Diffuse bony metastatic disease 2.  No fracture 3.  Bilateral hip joint osteoarthritis 4.  Lumbar spine facet arthropathy    Dx-femur-2+ Left    Result Date: 9/28/2018 9/28/2018 12:36 PM HISTORY/REASON FOR EXAM:  Left hip and thigh pain, metastatic breast cancer. . TECHNIQUE/EXAM DESCRIPTION AND NUMBER OF VIEWS:  2 views of the LEFT femur. COMPARISON: Pelvic x-ray 9/28/2018 FINDINGS: There are multiple sclerotic foci in left proximal femur compatible with metastatic disease. There are few sclerotic foci within the mid diaphysis also compatible metastatic disease. There is mild osteoarthritis of the left hip and left knee joints. There are no fracture.     1.  No fracture 2.  Sclerotic metastases 3.  Mild osteoarthritis of the left hip and left knee joints      IMPRESSION:    A 63 y.o. with stage IV breast cancer with symptomatic osseous metastasis.    RECOMMENDATIONS:   Reviewed imaging with patient.  Considering that she is symptomatic I did recommend single fraction treatments to the left hip proximal  femur, right hip proximal femur and right humerus.  She will receives 1500 cGy and a single fraction.  Technical aspects benefits risks associated radiotherapy were reviewed.  In this particular location I do not expect any significant adverse effects.  After our discussions she would like to proceed.  She will return for planning on October 17.  She will start treatment on the 24th and complete by the 25th.    Thank you for the opportunity to participate in her care.  If any questions or comments, please do not hesitate in calling.    Nafisa GARCIA M.D.  Electronically signed by: Nafisa Encarnacion V, 10/11/2018 2:16 PM  [phone]

## 2018-10-11 NOTE — NON-PROVIDER
Patient was seen today in clinic with Dr. Encarnacion for Breast cancer with mets to the bone..  Vitals signs and weight were obtained and pain assessment was completed.  Allergies and medications were reviewed with the patient.  Review of systems completed.     Vitals/Pain:  Vitals:    10/11/18 1252   BP: 126/65   Pulse: 99   Temp: 36.8 °C (98.3 °F)   SpO2: 100%   Weight: 45.5 kg (100 lb 4.8 oz)        Pain Scale: 0-10  Pain Assessement: 0  Pain Location, Orientation and Scale: no complaints of pain at this time  Last reported pain to left hip/thigh on 10/9/18. Patient said it is intermittent pain.        Allergies:   Other food    Current Medications:  Current Outpatient Prescriptions   Medication Sig Dispense Refill   • Gemcitabine HCl (GEMZAR IV) by Intravenous route.     • therapeutic multivitamin-minerals (THERAGRAN-M) Tab Take 1 Tab by mouth every day.     • Biotin 5000 MCG Cap Take 1 Tab by mouth.     • HYDROcodone-acetaminophen (NORCO) 5-325 MG Tab per tablet Take 1-2 Tabs by mouth every four hours as needed.     • fluconazole (DIFLUCAN) 200 MG Tab Take 400 mg by mouth every day.     • Pyridoxine HCl (VITAMIN B6 PO) Take 2,000 mg by mouth every day.     • Cholecalciferol (VITAMIN D) 2000 UNITS Cap Take 1 Cap by mouth every day. gelcap     • Cyanocobalamin (VITAMIN B 12 PO) Take 1 Tab by mouth every day.     • calcium carbonate-vitamin D (OSCAL 500/200 D-3) 500-200 MG-UNIT TABS Take 1 Tab by mouth 2 Times a Day.     • docosahexanoic acid (OMEGA 3 FA) 1000 MG CAPS Take 1,000 mg by mouth 2 Times a Day.       No current facility-administered medications for this encounter.          PCP:  Luis Ruvalcaba R.N.

## 2018-10-22 PROBLEM — D64.9 ANEMIA: Status: ACTIVE | Noted: 2018-01-01

## 2018-10-28 NOTE — NON-PROVIDER
Pt is here for her scheduled transfusion. Hb 6.4 today - 2 units RBCs indicated per set parameters. Consents signed. Questions answered. Premedicated. Two units transfused without an incidient - well tolerated. Discharged home to self care. Pt will f/u up with her MD on Tuesday to discuss the future plan for care.

## 2018-10-29 PROBLEM — B38.9 COCCIDIOIDOMYCOSIS: Status: ACTIVE | Noted: 2018-01-01

## 2018-10-29 NOTE — PROGRESS NOTES
[facility]    Subjective:     Chief Complaint   Patient presents with   • Follow-Up     Coccidioidomycosis     Interval History: 62 yo woman with a history of metastatic breast cancer and endometrial cancer.  Was referred to ID from Dr. Frank for further evaluation of diagnosed pulm cocci. Pt was originally dx with breast cancer in 2012 s/p left partial mastectomy and axillary dissection. She underwent chemoXRT. She developed back pain in Dec 2015 and imaging revealed a pathological fracture and diffuse metastasis. She had back surgery on 12/08/16 and path confirmed metastatic breast cancer. Underwent a CT chest December 2017 and it revealed a pulmonary nodule which was new. Nodule was biopsied on 01/10/18 and it was negative for cancer but path showed with cocci. She was started on fluconazole at that time.     2/26/18:  Seen by Dr. Breaux as a new consult for Pulmonary Cocci.  Cocci titers checked.  Pt asymptomatic.  Continue PO Fluc 400mg daily, which she initially started in January- anticipate 1 year of treatment.     4/26/2018: Seen by GHULAM Martinez.  Continue PO Fluconazole as directed, estimated end date January 2019.  Labs being monitored by Dr. Frank, will review.  CT every 6 months per Dr. Frank, will review.     Today, 10/29/2018: Accompanied by her daughter Jean Claude.  States that she is doing well with the p.o. fluconazole, denies having any issues.  Denies feeling generally ill, fevers/chills, general malaise, n/v/d, abdominal pain or chest pain.  States that she has had some headaches and shortness of breath, but this was due to her anemia.  States she had 2 units of PRBCs yesterday, notes that the shortness of breath has resolved and the headache has improved.  Was supposed to receive chemotherapy last Tuesday; however, due to the anemia, the chemotherapy was held.  Will be reevaluated tomorrow and hopefully resumed on chemotherapy.  Believes that Dr. Frank will do another CT  "around January.  Has neuropathy in her hands, states it is no worse than normal.  Denies her heart racing or having any heart palpitations.    ROS  As documented above in my HPI.    Past Medical History:   Diagnosis Date   • Anemia    • Arthritis     lower back   • Bronchitis     over 30 yrs ago   • Cancer (HCC) 3/2006    endometrial, left breast CA   • Cancer (HCC)      metastatic breast cancer   • History of chemotherapy     started Gemzar 9/24/18   • Hx of radiation therapy    • Hypertension    • Other specified symptom associated with female genital organs 3/2006    endometrial ca       Social History   Substance Use Topics   • Smoking status: Never Smoker   • Smokeless tobacco: Never Used   • Alcohol use Yes      Comment: 4-5 glasses wine per day,      12/7/2015   35 per week       Allergies: Other food    Pt's medication and problem list reviewed.     Objective:     PE:  /58   Pulse (!) 101   Temp 37 °C (98.6 °F) (Temporal)   Ht 1.55 m (5' 1.02\")   Wt 45.4 kg (100 lb)   SpO2 99%   Breastfeeding? No   BMI 18.88 kg/m²      Vital signs reviewed    Constitutional: Appears well-developed and well-nourished. No acute distress. Thin, frail.  Appears older than stated age.  Eyes: Conjunctivae normal and EOM are normal. Pupils are equal, round, and reactive to light.   Neck: Trachea midline. Normal range of motion.  No JVD.  Neck supple.  Cardiovascular: Tachycardic, regular rhythm, normal heart sounds. No murmur, gallop, or friction rub. No edema.  Respiratory: No respiratory distress, unlabored respiratory effort.  Lungs clear to auscultation bilaterally, slightly decreased in bilateral lower bases. No wheezes or rales.   Abdomen: Soft, non tender, non-distended. BS + x 4. No masses.   Musculoskeletal: Steady gait.  Normal range of motion.  No joint or bone tenderness, swelling, erythema or deformity.    Skin: Warm and dry. No visible rashes or lesions.  Neurological: No cranial nerve deficit. " Coordination normal.  Slightly decreased sensation to hands.  Psychiatric: Alert and oriented to person, place, and time. Normal mood, calm affect.  Pleasant.  Labs:  WBC   Date/Time Value Ref Range Status   10/28/2018 07:45 AM 5.3 4.8 - 10.8 K/uL Final     RBC   Date/Time Value Ref Range Status   10/28/2018 07:45 AM 2.30 (L) 4.20 - 5.40 M/uL Final     Hemoglobin   Date/Time Value Ref Range Status   10/28/2018 07:45 AM 6.4 (L) 12.0 - 16.0 g/dL Final     Hematocrit   Date/Time Value Ref Range Status   10/28/2018 07:45 AM 20.4 (L) 37.0 - 47.0 % Final     MCV   Date/Time Value Ref Range Status   10/28/2018 07:45 AM 88.7 81.4 - 97.8 fL Final     MCH   Date/Time Value Ref Range Status   10/28/2018 07:45 AM 27.8 27.0 - 33.0 pg Final     MCHC   Date/Time Value Ref Range Status   10/28/2018 07:45 AM 31.4 (L) 33.6 - 35.0 g/dL Final     MPV   Date/Time Value Ref Range Status   10/28/2018 07:45 AM 9.9 9.0 - 12.9 fL Final        Sodium   Date/Time Value Ref Range Status   10/25/2018 12:30  135 - 145 mmol/L Final     Potassium   Date/Time Value Ref Range Status   10/25/2018 12:30 PM 4.3 3.6 - 5.5 mmol/L Final     Chloride   Date/Time Value Ref Range Status   10/25/2018 12:30  96 - 112 mmol/L Final     Co2   Date/Time Value Ref Range Status   10/25/2018 12:30 PM 20 20 - 33 mmol/L Final     Glucose   Date/Time Value Ref Range Status   10/25/2018 12:30 PM 94 65 - 99 mg/dL Final     Bun   Date/Time Value Ref Range Status   10/25/2018 12:30 PM 17 8 - 22 mg/dL Final     Creatinine   Date/Time Value Ref Range Status   10/25/2018 12:30 PM 0.92 0.50 - 1.40 mg/dL Final   03/03/2006 03:45 AM 0.7 0.5 - 1.4 mg/dL Final       Alkaline Phosphatase   Date/Time Value Ref Range Status   10/25/2018 12:30  (H) 30 - 99 U/L Final     AST(SGOT)   Date/Time Value Ref Range Status   10/25/2018 12:30 PM 97 (H) 12 - 45 U/L Final     ALT(SGPT)   Date/Time Value Ref Range Status   10/25/2018 12:30 PM 21 2 - 50 U/L Final     Total Bilirubin    Date/Time Value Ref Range Status   10/25/2018 12:30 PM 0.4 0.1 - 1.5 mg/dL Final        Imagin2018   CT-CHEST,ABDOMEN,PELVIS WITH   Impression   1. At least 3 new subcentimeter hepatic lesions, too small to characterize, suspicious for early metastases.  2. Stable diffuse osseous metastatic disease.  3. Sequela of treated fungal infection in the right lower lobe. No new pulmonary nodules or masses.       Assessment and Plan:   The following treatment plan was discussed with patient at length:    1. Coccidioidomycosis  COCCI AB PANEL, SERUM    Continue p.o. fluconazole, estimated end date 2019.  We will recheck cocci panel to compare to previous results.  Would like to see patient following her next CT.  CT in July did not show any new pulmonary nodules or masses.  Continue with lab work per Dr. Frank, liver function minimally elevated, no cause for concern at this time.   2. Malignant neoplasm of lower-outer quadrant of left breast of female, estrogen receptor positive (HCC)  COCCI AB PANEL, SERUM    Follow-up with Dr. Frank as directed.     Follow up: 3 months, RTC sooner if needed. FU with PCP for ongoing chronic medical conditions.     Salud Cerda, [email or web address].       Please note that this dictation was created using voice recognition software. I have  worked with technical experts from [employer] to optimize the interface.  I have made every reasonable attempt to correct obvious errors, but there may be errors of grammar and possibly content that I did not discover before finalizing the note.

## 2018-11-21 NOTE — PROGRESS NOTES
Patient presents for lab draw prior to blood transfusion. Port accessed using sterile technique, blood drawn as ordered. Port flushed per protocol and de-accessed per patient request. Patient returns tomorrow and released in no acute distress with her friend.

## 2018-11-22 NOTE — PROGRESS NOTES
Pt ambulatory to [facility] for blood transfusion.  Pt w/ no s/s of infection, pt has no complaints at this time.  Pt had labs drawn 11-21, verified pt Hgb of 6.7, meets parameters for 2 units PRBCs.  Pt PORT accessed using sterile technique, brisk blood return noted, flushed per protocol.  Pre-meds taken w/ no adverse reactions.  2 units PRBCs infused w/ no adverse reactions.  PORT flushed per protocol, heparinized, and de-accessed w/ gauze and tegaderm dressing placed.  Pt left on foot in NAD.  No f/u needed at this time.

## 2018-12-19 NOTE — PROGRESS NOTES
[facility] FOLLOW-UP NOTE     Date of Service: 12/19/2018    Chief Complaint: Follow-up of coccidioidomycosis    History of Present Illness:     64 yo woman with a history of metastatic breast cancer and endometrial cancer.  Was referred to ID from Dr. Frank for further evaluation of diagnosed pulm cocci. Pt was originally dx with breast cancer in 2012 s/p left partial mastectomy and axillary dissection. She underwent chemoXRT. She developed back pain in Dec 2015 and imaging revealed a pathological fracture and diffuse metastasis. She had back surgery on 12/08/16 and path confirmed metastatic breast cancer. Underwent a CT chest December 2017 and it revealed a pulmonary nodule which was new. Nodule was biopsied on 01/10/18 and it was negative for cancer but path showed with cocci. She was started on fluconazole at that time.     2/26/18:  Seen by Dr. Breaux as a new consult for Pulmonary Cocci.  Cocci titers checked.  Pt asymptomatic.  Continue PO Fluc 400mg daily, which she initially started in January- anticipate 1 year of treatment.      4/26/2018: Seen by GHULAM Martinez.  Continue PO Fluconazole as directed, estimated end date January 2019.  Labs being monitored by Dr. Frank, will review.  CT every 6 months per Dr. Frank, will review.      10/29/2018: Accompanied by her daughter Jean Claude.  States that she is doing well with the p.o. fluconazole, denies having any issues.  Denies feeling generally ill, fevers/chills, general malaise, n/v/d, abdominal pain or chest pain.  States that she has had some headaches and shortness of breath, but this was due to her anemia.  States she had 2 units of PRBCs yesterday, notes that the shortness of breath has resolved and the headache has improved.  Was supposed to receive chemotherapy last Tuesday; however, due to the anemia, the chemotherapy was held.  Will be reevaluated tomorrow and hopefully resumed on chemotherapy.  Believes that Dr. Frank will  do another CT around January.  Has neuropathy in her hands, states it is no worse than normal.  Denies her heart racing or having any heart palpitations.    12/19/2018.  Patient continues to tolerate the p.o. Fluconazole well which she has been taking daily.  No fevers, chills, nausea or vomiting, abdominal pain or diarrhea.  No cough or shortness of breath.  She does report continued weight loss and occasional night sweats.  Repeat CT on 11/27 showed interval development of numerous small thyroid nodules and hypodense liver lesions which are suspicious for metastatic disease.  Last cocci antibody from 11/12 was negative and repeat from 12/18 is pending.  Last CMP and CBC from 12/11 with no issues with the fluconazole.    Patient was seen by oncology on 12/18.  Their assessment is that the new nodules most likely represent metastatic disease.  Plan is to initiate Abraxane (paclitaxel) with monthly monitoring of CA 27-29.    Review of Systems:  All other systems reviewed and are negative expect as noted in HPI    Past Medical History:   Diagnosis Date   • Anemia    • Arthritis     lower back   • Bronchitis     over 30 yrs ago   • Cancer (HCC) 3/2006    endometrial, left breast CA   • Cancer (HCC)      metastatic breast cancer   • History of chemotherapy     started Gemzar 9/24/18   • Hx of radiation therapy    • Hypertension    • Other specified symptom associated with female genital organs 3/2006    endometrial ca       Past Surgical History:   Procedure Laterality Date   • CATH PLACEMENT Right 8/31/2018    Procedure: CATH PLACEMENT- PORT;  Surgeon: Tricia Fragoso M.D.;  Location: SURGERY SAME DAY [facility];  Service: General   • THORACIC FUSION O-ARM  12/8/2015    Procedure: THORACIC FUSION O-ARM T10-L4 INSTRUMENTED FOR METASTATIC CORD COMPRESSION;  Surgeon: Safia Orr M.D.;  Location: SURGERY [facility];  Service:    • LUMBAR LAMINECTOMY DISKECTOMY  12/8/2015    Procedure: LUMBAR LAMINECTOMY  DISKECTOMY FOR L1 VERTEBRECTOMY AND POSTERIOR DECOMPRESSION T12-L2 ;  Surgeon: Safia Orr M.D.;  Location: SURGERY [facility];  Service:    • MASTECTOMY  12/27/2012    Performed by Tricia Fragoso M.D. at SURGERY [facility]   • NODE BIOPSY SENTINEL  12/27/2012    Performed by Tricia Fragoso M.D. at SURGERY [facility]   • CATH REMOVAL  12/27/2012    Performed by Tricia Fragoso M.D. at SURGERY [facility]   • CATH PLACEMENT  8/30/2012    Performed by TRICIA FRAGOSO at SURGERY [facility]       Family history  Reviewed and not pertinent.      Social History     Social History   • Marital status:      Spouse name: N/A   • Number of children: N/A   • Years of education: N/A     Occupational History   • Not on file.     Social History Main Topics   • Smoking status: Never Smoker   • Smokeless tobacco: Never Used   • Alcohol use Yes      Comment: 4-5 glasses wine per day,      12/7/2015   35 per week   • Drug use: No   • Sexual activity: Not on file     Other Topics Concern   • Not on file     Social History Narrative   • No narrative on file       Allergies   Allergen Reactions   • Other Food Vomiting     Sea scallops  RXN=ongoing       Medications:  Current Outpatient Prescriptions on File Prior to Visit   Medication Sig Dispense Refill   • fluconazole (DIFLUCAN) 100 MG Tab Take 4 Tabs by mouth every day for 90 days. 360 Tab 0   • Gemcitabine HCl (GEMZAR IV) by Intravenous route.     • Biotin 5000 MCG Cap Take 1 Tab by mouth.     • therapeutic multivitamin-minerals (THERAGRAN-M) Tab Take 1 Tab by mouth every day.     • HYDROcodone-acetaminophen (NORCO) 5-325 MG Tab per tablet Take 1-2 Tabs by mouth every four hours as needed.     • fluconazole (DIFLUCAN) 200 MG Tab Take 400 mg by mouth every day.     • Pyridoxine HCl (VITAMIN B6 PO) Take 2,000 mg by mouth every day.     • Cholecalciferol (VITAMIN D) 2000 UNITS Cap Take 1 Cap by mouth every day. gelcap     • Cyanocobalamin (VITAMIN B  12 PO) Take 1 Tab by mouth every day.     • calcium carbonate-vitamin D (OSCAL 500/200 D-3) 500-200 MG-UNIT TABS Take 1 Tab by mouth 2 Times a Day.     • docosahexanoic acid (OMEGA 3 FA) 1000 MG CAPS Take 1,000 mg by mouth 2 Times a Day.       No current facility-administered medications on file prior to visit.        Physical Exam:   Vital Signs: There were no vitals taken for this visit.  Vital signs reviewed  Constitutional: Patient is oriented to person, place, and time. Appears cachectic  Head: Temporal wasting noted.  Firm nontender approximately 1 cm nodular mass palpable posteriorly  Eyes: Conjunctivae normal, EOM intact. Pupils are equal, round, and reactive to light.   Mouth/Throat: Lips without lesions, oropharynx is clear and moist.  Neck: Neck supple. No masses/lymphadenopathy  Cardiovascular: Normal rate, regular rhythm, normal S1S2 and intact distal pulses. No murmur, gallop, or friction rub. No pedal edema.  Pulmonary/Chest: No respiratory distress. Unlabored respiratory effort, lungs clear to auscultation. No wheezes or rales.   Abdominal: Soft, non tender. BS + x 4. No masses or hepatosplenomegaly.  Abdomen is scaphoid.  A firm nontender, not freely mobile subcutaneous nodule palpable over epigastric region  Musculoskeletal: No joint tenderness, swelling, erythema, or restriction of motion noted.  Neurological: Alert and oriented to person, place, and time. No gross cranial nerve deficit. No focal neural deficit noted  Skin: As above.  Skin is warm and dry. No rashes or embolic phenomena noted on exposed skin  Psychiatric: Normal mood and affect. Behavior is normal.  Patient is very pleasant.    LABS:  WBC   Date/Time Value Ref Range Status   12/11/2018 10:55 AM 6.5 4.8 - 10.8 K/uL Final     RBC   Date/Time Value Ref Range Status   12/11/2018 10:55 AM 3.56 (L) 4.20 - 5.40 M/uL Final     Hemoglobin   Date/Time Value Ref Range Status   12/11/2018 10:55 AM 10.0 (L) 12.0 - 16.0 g/dL Final      Hematocrit   Date/Time Value Ref Range Status   12/11/2018 10:55 AM 32.4 (L) 37.0 - 47.0 % Final     MCV   Date/Time Value Ref Range Status   12/11/2018 10:55 AM 91.0 81.4 - 97.8 fL Final     MCH   Date/Time Value Ref Range Status   12/11/2018 10:55 AM 28.1 27.0 - 33.0 pg Final     MCHC   Date/Time Value Ref Range Status   12/11/2018 10:55 AM 30.9 (L) 33.6 - 35.0 g/dL Final     MPV   Date/Time Value Ref Range Status   12/11/2018 10:55 AM 9.9 9.0 - 12.9 fL Final     Sodium   Date/Time Value Ref Range Status   12/11/2018 10:55  (L) 135 - 145 mmol/L Final     Potassium   Date/Time Value Ref Range Status   12/11/2018 10:55 AM 4.1 3.6 - 5.5 mmol/L Final     Chloride   Date/Time Value Ref Range Status   12/11/2018 10:55 AM 96 96 - 112 mmol/L Final     Co2   Date/Time Value Ref Range Status   12/11/2018 10:55 AM 23 20 - 33 mmol/L Final     Glucose   Date/Time Value Ref Range Status   12/11/2018 10:55  (H) 65 - 99 mg/dL Final     Bun   Date/Time Value Ref Range Status   12/11/2018 10:55 AM 14 8 - 22 mg/dL Final     Creatinine   Date/Time Value Ref Range Status   12/11/2018 10:55 AM 0.84 0.50 - 1.40 mg/dL Final   03/03/2006 03:45 AM 0.7 0.5 - 1.4 mg/dL Final     Alkaline Phosphatase   Date/Time Value Ref Range Status   12/11/2018 10:55  (H) 30 - 99 U/L Final     AST(SGOT)   Date/Time Value Ref Range Status   12/11/2018 10:55 AM 85 (H) 12 - 45 U/L Final     ALT(SGPT)   Date/Time Value Ref Range Status   12/11/2018 10:55 AM 24 2 - 50 U/L Final     Total Bilirubin   Date/Time Value Ref Range Status   12/11/2018 10:55 AM 0.7 0.1 - 1.5 mg/dL Final      No results found for: CPKTOTAL     MICRO:  No results found for: BLOODCULTU, BLDCULT, BCHOLD      Latest pertinent labs were reviewed    IMAGING STUDIES:  CT from 11/27 with interval development of numerous small pulmonary and liver nodules suspicious for metastasis per my read    Assessment:   The following treatment plan was discussed with patient at  length:     1. Coccidioidomycosis  COCCI AB PANEL, SERUM     -Continue p.o. Fluconazole 400 mg daily, estimated end date January 2019, likely through a repeat CT at the end of the month.   -New nodules on imaging likely represent metastases.  Repeat cocci antibody panel from yesterday is pending  -Plan to continue fluconazole.  Patient aware and reports that oncology will be using a decreased dose of the protein bound paclitaxel due to an interaction (fluconazole will increase its levels)   2. Malignant neoplasm of lower-outer quadrant of left breast of female, estrogen receptor positive (HCC)  COCCI AB PANEL, SERUM     -Follow-up with Dr. Frank as directed.  Suspicion for further development of metastatic disease as above  -Plan is to put patient on paclitaxel as above.  Patient aware that reduced dosing will be used given interaction as above      Follow up: 1 month, RTC sooner if needed. FU with PCP and oncology for ongoing chronic medical conditions.     Nickolas Jacobo M.D.

## 2018-12-22 PROBLEM — Z71.89 DNR (DO NOT RESUSCITATE) DISCUSSION: Status: ACTIVE | Noted: 2018-01-01

## 2018-12-22 PROBLEM — N17.9 ACUTE KIDNEY INJURY (HCC): Status: ACTIVE | Noted: 2018-01-01

## 2018-12-22 PROBLEM — R07.9 PAIN IN THE CHEST: Status: ACTIVE | Noted: 2018-01-01

## 2018-12-22 PROBLEM — K85.90 ACUTE PANCREATITIS: Status: ACTIVE | Noted: 2018-01-01

## 2018-12-22 PROBLEM — E43 SEVERE PROTEIN-CALORIE MALNUTRITION (GOMEZ: LESS THAN 60% OF STANDARD WEIGHT) (HCC): Status: ACTIVE | Noted: 2018-01-01

## 2018-12-22 NOTE — CARE PLAN
Problem: Safety  Goal: Will remain free from falls  Outcome: PROGRESSING AS EXPECTED  Fall risk assessed, and in place.  Patient is moderate fall risk.  Patient educated not to get up with out assistance.  Patient verbalized understanding.  Bed alarm in place and on.  Call light with in reach.        Problem: Infection  Goal: Will remain free from infection  Outcome: PROGRESSING AS EXPECTED  Educated patient and family members on hand washing. Standard precautions in place. Assessed patient for s/s of infection.

## 2018-12-22 NOTE — ED NOTES
Bedside report received from Sindi RN assumed care done.  Rounded on pt. Noted resting in bed comfortably. Rates pain as 3/10. Call light within reach.   Pt denies any new complaints. Will continue to monitor.

## 2018-12-22 NOTE — ASSESSMENT & PLAN NOTE
Comfort care only since 12/24.  12/25:  Increase in pain medications:  IV dilaudid, ativan liquid po increased with better control of pain/agitation.  Unable to tolerate morphine po or IV per bedside RN, does not help with pain.

## 2018-12-22 NOTE — PROGRESS NOTES
Patient transferred from ER red 4 to T830 via gurney.  Tele box on, monitor room notified.  Monitor rhythm SR 90's.  Vital signs stable, patient updated on plan of care.  All safety measures in place.  Alarm strip in place, working appropriately.  Bedside report received.

## 2018-12-22 NOTE — ED NOTES
Patient is resting comfortably. NAD. Respiration unlabored. Updated with the poc.  Waiting for CT.

## 2018-12-22 NOTE — ED PROVIDER NOTES
ED Provider Note    CHIEF COMPLAINT  Chief Complaint   Patient presents with   • Chest Pain   • N/V       HPI  Geeta Mcleod is a 63 y.o. female who presents for evaluation of chest pain.  Patient states around 11 PM last night she began developing chest pain which she describes as a present sensation in the substernal area with some pain radiating toward the left lateral chest area with associated shortness of breath.  The patient has a past history of metastatic breast cancer and last received chemotherapy at Dr. Frank's office 2 days ago.  She is also received radiation therapy couple of weeks ago.  The patient denies associated: Fever, chills, cough, sputum, hemoptysis, vomiting, diarrhea, pain or swelling in lower extremities, neurological symptoms.  No other acute symptomatology or complaints.    REVIEW OF SYSTEMS  See HPI for further details. All other systems negative.    PAST MEDICAL HISTORY  Past Medical History:   Diagnosis Date   • Anemia    • Arthritis     lower back   • Bronchitis     over 30 yrs ago   • Cancer (HCC) 3/2006    endometrial, left breast CA   • Cancer (HCC)      metastatic breast cancer   • History of chemotherapy     started Gemzar 9/24/18   • Hx of radiation therapy    • Hypertension    • Other specified symptom associated with female genital organs 3/2006    endometrial ca       FAMILY HISTORY  History reviewed. No pertinent family history.    SOCIAL HISTORY  Non-smoker; occasional alcohol use;    SURGICAL HISTORY  Past Surgical History:   Procedure Laterality Date   • CATH PLACEMENT Right 8/31/2018    Procedure: CATH PLACEMENT- PORT;  Surgeon: Tricia Fragoso M.D.;  Location: SURGERY SAME DAY [facility];  Service: General   • THORACIC FUSION O-ARM  12/8/2015    Procedure: THORACIC FUSION O-ARM T10-L4 INSTRUMENTED FOR METASTATIC CORD COMPRESSION;  Surgeon: Safia Orr M.D.;  Location: SURGERY [facility];  Service:    • LUMBAR LAMINECTOMY DISKECTOMY  12/8/2015     Procedure: LUMBAR LAMINECTOMY DISKECTOMY FOR L1 VERTEBRECTOMY AND POSTERIOR DECOMPRESSION T12-L2 ;  Surgeon: Safia Orr M.D.;  Location: [facility];  Service:    • MASTECTOMY  12/27/2012    Performed by Tricia Fargoso M.D. at [facility]   • NODE BIOPSY SENTINEL  12/27/2012    Performed by Tricia Fragoso M.D. at SURGERY [facility]   • CATH REMOVAL  12/27/2012    Performed by Tricia Fragoso M.D. at [facility]   • CATH PLACEMENT  8/30/2012    Performed by TRICIA FRAGOSO at SURGERY [facility]       CURRENT MEDICATIONS  Home Medications     Reviewed by Sindi Major R.N. (Registered Nurse) on 12/22/18 at 0615  Med List Status: Partial   Medication Last Dose Status   Biotin 5000 MCG Cap  Active   calcium carbonate-vitamin D (OSCAL 500/200 D-3) 500-200 MG-UNIT TABS  Active   Cholecalciferol (VITAMIN D) 2000 UNITS Cap  Active   Cyanocobalamin (VITAMIN B 12 PO)  Active   docosahexanoic acid (OMEGA 3 FA) 1000 MG CAPS  Active   fluconazole (DIFLUCAN) 100 MG Tab  Active   fluconazole (DIFLUCAN) 200 MG Tab  Active   Gemcitabine HCl (GEMZAR IV)  Active   HYDROcodone-acetaminophen (NORCO) 5-325 MG Tab per tablet  Active   Pyridoxine HCl (VITAMIN B6 PO)  Active   therapeutic multivitamin-minerals (THERAGRAN-M) Tab  Active                ALLERGIES  Allergies   Allergen Reactions   • Other Food Vomiting     Sea scallops  RXN=ongoing       PHYSICAL EXAM  VITAL SIGNS: BP (!) 99/69   Pulse 95   Temp 36.2 °C (97.2 °F) (Temporal)   Resp (!) 22   Wt 37.6 kg (83 lb)   SpO2 100%   BMI 15.67 kg/m²    Constitutional: 63-year-old female, thin, cachectic, weak appearing, oriented x3  HENT: ,Atraumatic, Bilateral external ears normal, tympanic membranes clear, Oropharynx mildly dry, No oral exudates, Nose normal.   Eyes: PERRL, EOMI, Conjunctiva normal, No discharge.   Neck: Normal range of motion, No tenderness, Supple, No stridor.   Lymphatic: No lymphadenopathy noted.    Cardiovascular: Normal heart rate, Normal rhythm, No murmurs, No rubs, No gallops.   Thorax & Lungs: Decreased breath sounds bilaterally with associated rhonchi, No respiratory distress, No wheezing, no stridor, no rales. No chest tenderness.   Abdomen: Soft, nontender, nondistended, no organomegaly, positive bowel sounds normal in quality. No guarding or rebound.  Skin: Decreased skin turgor, pink, warm, dry. No rashes, petechiae, purpura. Normal capillary refill.   Back: No tenderness, No CVA tenderness.   Extremities: Intact distal pulses, No edema, No tenderness, No cyanosis, No clubbing. Vascular: Pulses are 2+, symmetric in the upper and lower extremities.  Musculoskeletal: Diffuse arthritic changes with diffuse muscular atrophy. No tenderness to palpation or major deformities noted.   Neurologic: Weak appearing, oriented x 3, Normal motor function, Normal sensory function, No gross focal deficits noted.   Psychiatric: Affect normal, Judgment normal, Mood normal.         EKG  I have interpreted: Rate 90, rhythm sinus, left axis deviation, W[place] QRS QT intervals normal, small Q waves in 3 and aVF, diffuse nonspecific ST-T wave changes, twelve-lead EKG, no acute change compared to previous tracing;    RADIOLOGY/PROCEDURES  CT-CTA CHEST PULMONARY ARTERY W/ RECONS   Final Result         1. No CT evidence of pulmonary embolism.      2. Redemonstration of bilateral pulmonary nodules, similar to prior      3. Redemonstration of extensive osseous metastasis.      4. Significantly dilated stomach filling with fluid.      5. Multiple liver metastasis are partially visualized.      DX-CHEST-LIMITED (1 VIEW)   Final Result         1.  No acute cardiopulmonary disease.            COURSE & MEDICAL DECISION MAKING  Pertinent Labs & Imaging studies reviewed. (See chart for details)  1.  IV normal saline; IV fluids administered for clinical dehydration along with initial hypotension upon presentation; reevaluation revealed  stable status  2.  Aspirin    Laboratory studies: CBC shows white count 7.6, 77% neutrophils, 2% myelocytes, 12% lymphocytes, 3% monocytes, hemoglobin 8.7, hematocrit 27.3; PT 16.1/INR 1.28, PTT 25; troponin less than 0.01; d-dimer 9.5; CMP shows an anion gap of 20, random glucose 172, BUN 35, AST 81, alk phos 684, otherwise within normal; lipase elevated at 673;    Discussion/consultation: At this time, the patient presents for evaluation of chest pain.  The patient's initial EKG and troponin are negative.  Patient did have an elevated d-dimer and underwent CT scanning which showed no evidence of pulmonary embolism.  Patient has known history of breast cancer with extensive metastasis.  I spoke with the hospitalist on-call.  The patient will be admitted for further monitoring, treatment, and care.    FINAL IMPRESSION  1. Chest pain, unspecified type    2. Carcinoma of breast metastatic to bone, unspecified laterality (HCC)        PLAN  1.  Patient will be admitted for further monitoring, treatment, and care.    Electronically signed by: Guy G Gansert, 12/22/2018 6:31 AM

## 2018-12-22 NOTE — ED NOTES
Assumed care of pt,  Updated on POC.  Water given per request, family at bedside.  Deny other needs at this time

## 2018-12-22 NOTE — ASSESSMENT & PLAN NOTE
-Likely prerenal.  I will start her on IV fluid rehydration with lactated Ringer as above.  Monitor for improvement in renal function.   - avoid nephrotoxins, and continue to renally dose all medications.  12/25:  Now on CC, no further monitoring.

## 2018-12-22 NOTE — ASSESSMENT & PLAN NOTE
D/w the pt and her daugther and explained to  Them about gi assessment.  They also know that she has advanced cancer with what appears to be outlet obstruction  Pt is dnr/dni and they are both agreeable to hospice and comfort care.  No more medical intervention  Comfort care order set  Hospice is consulted.  mrcp showed:1.  Diffuse liver metastases.    2.  Diffuse bony metastasis with pathologic fracture of the L1 vertebral body status post extensive pedicle screw and tal fixation from T10 through L4.    3.  No evidence of intra or extrahepatic biliary ductal obstruction or obstruction of the pancreatic duct. Further there is no evidence of filling defects in the biliary ductal system.    4.  Marked gastric distention with dilatation of the proximal duodenum, the possibility of mid to distal duodenal obstruction should be considered versus gastroparesis.  12/25:  GI signed off, now comfort care.  Not eating or drinking x several days.  Pain controlled with IV dilaudid.

## 2018-12-22 NOTE — PROGRESS NOTES
2 RN skin check completed with TATYANA Fuentes:    Bilateral ears pink and blanching, pt wears glasses  Elbows intact, pink, and blanching  Sacrum was intact, pink, and blanching, mepilex in place  Bilateral heels intact and blanching  Overall skin is intact  Pt placed on waffle mattress

## 2018-12-22 NOTE — ED TRIAGE NOTES
"Chief Complaint   Patient presents with   • Chest Pain   • N/V     Patient BIB ambulance from [facility]. Patient c/o of intermittent chest pain since 2300 last night. The chest pain is described as a \"punched\" sensation and is non-radiating. Patient also reporting n/v since the chest pain began. Patient was treated with 324 mg ASA and nitro x 1 with EMS.    "

## 2018-12-22 NOTE — PROGRESS NOTES
Entered Red 4. Pt had vomited approx. 800 ml brown liquid. Zofran IV PRN given. Pt transferred up to T830 from ER. Report given to Mary Jane JOE. Called Dr. Conklin, updated him concerning pt condition, orders received.

## 2018-12-22 NOTE — H&P
Hospital Medicine History & Physical Note    Date of Service  12/22/2018    Primary Care Physician  Lorena Smith M.D.    Consultants  None    Code Status  DNR/DNI    Chief Complaint  Chest pain, back pain, nausea, vomiting    History of Presenting Illness  63 y.o. Female with known breast and endometrial cancer, metastatic to the liver, and bone, s/p chemoradiation, with last chemotherapy 2 days ago, and radiation 2 weeks ago, along with history of pulmonary coccidiomycosis on oral fluconazole, hypertension, who presented 12/22/2018 with chest pain, back pain, and nausea/vomiting.    Patient stated that since last night, she started to have pressure-like pain on the left side of her chest, along with mid back pain, which she rated as 8 out of 10 in severity, and not associated with movement, breathing or coughing.  She also started to have nausea, and multiple episodes of nonbloody bilious vomiting although she denied any abdominal pain.  She did not have any diarrhea or bowel movement changes, or dysuria.  She had no appetite in the last several days, and she denied any fevers or chills. She did have intermittent chest pains before, but this time around the pain did not go away and persisted, prompting her to go to the ED.    ED course:  The patient was initially evaluated, with stable vital signs.  Initial blood workup showed no leukocytosis, with creatinine of 1.17 from baseline of 0.8, and BUN of 35.  Troponin was negative.  Alkaline phosphatase was elevated at 184, with normal transaminases.  Lipase was significantly elevated at 673.  D-dimer was obtained which was elevated at 9.51 prompting a CT abdomen of the chest which did not show any PE, but did showed bilateral pulmonary nodules, and the extensive osseous metastasis, and liver metastasis.  EKG (personally reviewed) did not show any dynamic EKG changes, with Q waves in the inferior leads which remained unchanged, and normal sinus rhythm.  Patient  was given normal saline, and was subsequently admitted to the hospitalist service.    Review of Systems  ROS     Pertinent positives/negatives as mentioned above.     A complete review of systems was personally done by me. All other systems were negative.       Past Medical History   has a past medical history of Anemia; Arthritis; Bronchitis; Cancer (HCC) (3/2006); Cancer (HCC) ( ); History of chemotherapy; radiation therapy; Hypertension; and Other specified symptom associated with female genital organs (3/2006).    Surgical History   has a past surgical history that includes cath placement (8/30/2012); mastectomy (12/27/2012); node biopsy sentinel (12/27/2012); cath removal (12/27/2012); thoracic fusion o-arm (12/8/2015); lumbar laminectomy diskectomy (12/8/2015); and cath placement (Right, 8/31/2018).     Family History  Reviewed. Non-contributory.       Social History   reports that she has never smoked. She has never used smokeless tobacco. She reports that she drinks alcohol. She reports that she does not use drugs.    Allergies  Allergies   Allergen Reactions   • Other Food Vomiting     Sea scallops  RXN=ongoing       Medications  Prior to Admission Medications   Prescriptions Last Dose Informant Patient Reported? Taking?   PREVIDENT 0.2 % Solution 12/18/2018 at PM Patient Yes No   Sig: Take 1 Dose by mouth every 7 days.   fluconazole (DIFLUCAN) 100 MG Tab 12/21/2018 at 1900 Patient No No   Sig: Take 4 Tabs by mouth every day for 90 days.      Facility-Administered Medications: None       Physical Exam  Temp:  [36.2 °C (97.2 °F)] 36.2 °C (97.2 °F)  Pulse:  [46-95] 46  Resp:  [16-31] 31  BP: (99)/(69) 99/69    Physical Exam   Constitutional: She is oriented to person, place, and time. She appears well-developed. No distress.   Cachectic   HENT:   Head: Normocephalic and atraumatic.   Mouth/Throat: No oropharyngeal exudate.   Eyes: Pupils are equal, round, and reactive to light. Conjunctivae are normal. No  scleral icterus.   Neck: Normal range of motion. Neck supple.   Cardiovascular: Normal rate and regular rhythm.  Exam reveals no gallop and no friction rub.    No murmur heard.  Pulmonary/Chest: Effort normal and breath sounds normal. No respiratory distress. She has no wheezes. She has no rales. She exhibits no tenderness.   Abdominal: Soft. Bowel sounds are normal. She exhibits no distension. There is tenderness ( Epigastrium). There is no rebound and no guarding.   Musculoskeletal: Normal range of motion. She exhibits no edema or tenderness.   Lymphadenopathy:     She has no cervical adenopathy.   Neurological: She is alert and oriented to person, place, and time. No cranial nerve deficit. Coordination normal.   Skin: Skin is warm and dry. No rash noted. She is not diaphoretic. No erythema. No pallor.   Psychiatric: She has a normal mood and affect. Her behavior is normal. Judgment and thought content normal.   Nursing note and vitals reviewed.      Laboratory:  Recent Labs      12/22/18 0623   WBC  7.6   RBC  3.07*   HEMOGLOBIN  8.7*   HEMATOCRIT  27.3*   MCV  88.9   MCH  28.3   MCHC  31.9*   RDW  50.1*   PLATELETCT  184   MPV  10.9     Recent Labs      12/22/18 0623   SODIUM  139   POTASSIUM  3.9   CHLORIDE  94*   CO2  25   GLUCOSE  172*   BUN  35*   CREATININE  1.17   CALCIUM  10.4     Recent Labs      12/22/18 0623   ALTSGPT  20   ASTSGOT  81*   ALKPHOSPHAT  184*   TBILIRUBIN  0.6   LIPASE  673*   GLUCOSE  172*     Recent Labs      12/22/18 0623   APTT  25.1   INR  1.28*     Recent Labs      12/22/18 0623   BNPBTYPENAT  102*         Recent Labs      12/22/18 0623   TROPONINI  <0.01       Urinalysis:    No results found     Imaging:  CT-CTA CHEST PULMONARY ARTERY W/ RECONS   Final Result         1. No CT evidence of pulmonary embolism.      2. Redemonstration of bilateral pulmonary nodules, similar to prior      3. Redemonstration of extensive osseous metastasis.      4. Significantly dilated  stomach filling with fluid.      5. Multiple liver metastasis are partially visualized.      DX-CHEST-LIMITED (1 VIEW)   Final Result         1.  No acute cardiopulmonary disease.      EC-ECHOCARDIOGRAM COMPLETE W/ CONT    (Results Pending)   [id number]    (Results Pending)         Assessment/Plan:  I anticipate this patient will require at least two midnights for appropriate medical management, necessitating inpatient admission.    * Acute pancreatitis- (present on admission)   Assessment & Plan    -With significant elevated lipase of 673, and symptoms of back pain, nausea/vomiting, and epigastric tenderness on examination.  -Given her known metastatic breast cancer to the liver, I am concerned about pancreatic ductal obstructing mass.  I would obtain an MRCP to evaluate for that.  -I will start her on supportive care, with IV fluids with LR at 125 cc/h, along with bowel rest with clear liquid diet.  I will start her on oral oxycodone, and IV morphine for pain, allow with as needed antiemetics.  -Trend lipase levels.     Pain in the chest- (present on admission)   Assessment & Plan    -Likely related to acute pancreatitis.  Low risk for ACS.  -Management of pancreatitis as above.  For completion, I will trend her troponins, and obtain an echocardiogram.  Monitor on telemetry.     DNR (do not resuscitate) discussion- (present on admission)   Assessment & Plan    -I discussed advance care planning with the patient for at least 10 minutes, including diagnosis, prognosis, plan of care, risks and benefits of any therapies that could be offered, as well as alternatives including palliation and hospice, as appropriate.  Particularly, I discussed with her about her CODE STATUS, especially with her metastatic malignancy, and poor functional performance.  I asked her if she would want to receive CPR in the case of cardiac arrest, and she replied no.  She also does not want intubation in the event of respiratory arrest or  distress.  CODE STATUS updated in the epic system.     Acute kidney injury (HCC)- (present on admission)   Assessment & Plan    -Likely prerenal.  I will start her on IV fluid rehydration with lactated Ringer as above.  Monitor for improvement in renal function.   - avoid nephrotoxins, and continue to renally dose all medications.     Severe protein-calorie malnutrition (Nathan: less than 60% of standard weight) (HCC)- (present on admission)   Assessment & Plan    -In setting of cancer cachexia.  -Clear liquid diet for now given pancreatitis.  Consider nutrition consult for supplements once eating fully.     Pulmonary coccidioidomycosis- (present on admission)   Assessment & Plan    -I would continue her on her course of fluconazole.  She follows up with outpatient infectious disease clinic.     HTN (hypertension)- (present on admission)   Assessment & Plan    -Monitor blood pressure trend closely, with as needed oral clonidine, and IV hydralazine and Vasotec for significant hypertension.     Breast CA metastatic to the liver, and bone (HCC)- (present on admission)   Assessment & Plan    -Status post chemoradiation.   -She will continue to follow-up with outpatient oncology.  -Evaluation for pancreatic ductal obstructing mass as above.         VTE prophylaxis: heparin SQ

## 2018-12-22 NOTE — ED NOTES
Med Rec completed per patient  Allergies reviewed      Patient started a new chemo infusion on 12-20-18, gets once weekly for 3 weeks, then off for 1 week. Patient is unsure of the name of medication

## 2018-12-23 NOTE — PROGRESS NOTES
Bedside report received from Kierra JOE. Patient's chart and MAR reviewed. 12 hour chart check complete. Pt is awake in bed. Pt is AOx4. Patient was updated on plan of care for the day. Questions answered and concerns addressed.  Pt denies any additional needs at this time. White board updated. Call light and personal belongings within reach, bed in lowest position.

## 2018-12-23 NOTE — PROGRESS NOTES
2 RN Skin Check    2 RN skin check performed with TATYANA Ahuja.    Patient's bilateral ears were pink and blanching.    Patient's bilateral elbows were pink and blanching.  Patient's bilateral feet were dry and calloused with normal colored skin and blanching heels.  Patient's sacrum is red and blanching with extreme bony prominence.  Mepilex placed and waffle cushion in place to protect skin.

## 2018-12-23 NOTE — CARE PLAN
Problem: Safety  Goal: Will remain free from injury  Outcome: PROGRESSING AS EXPECTED  Fall precautions in place. Bed in lowest position. Non-skid socks in place. Personal possessions within reach. Mobility sign on door. Bed-alarm on. Call light within reach. Pt educated regarding fall prevention and states understanding.    Problem: Knowledge Deficit  Goal: Knowledge of disease process/condition, treatment plan, diagnostic tests, and medications will improve  Outcome: PROGRESSING AS EXPECTED  Pt educated regarding plan of care and medications. All questions answered.

## 2018-12-23 NOTE — PROGRESS NOTES
[facility] Medicine Daily Progress Note    Date of Service  12/23/2018    Chief Complaint  63 y.o. female admitted 12/22/2018 with abd pain and vomiting    Hospital Course    63 y.o. Female with known breast and endometrial cancer, metastatic to the liver, and bone, s/p chemoradiation, with last chemotherapy 2 days ago, and radiation 2 weeks ago, along with history of pulmonary coccidiomycosis on oral fluconazole, hypertension, who presented 12/22/2018 with chest pain, back pain, and nausea/vomiting.     Patient stated that since last night, she started to have pressure-like pain on the left side of her chest, along with mid back pain, which she rated as 8 out of 10 in severity, and not associated with movement, breathing or coughing.  She also started to have nausea, and multiple episodes of nonbloody bilious vomiting although she denied any abdominal pain.  She did not have any diarrhea or bowel movement changes, or dysuria    Interval Problem Update  mrcp result is noted    Consultants/Specialty  gi    Code Status  dnr    Disposition  pending    Review of Systems  Review of Systems   Constitutional: Negative for chills, diaphoresis and fever.   HENT: Negative for ear discharge, ear pain and tinnitus.    Eyes: Negative for blurred vision, double vision and photophobia.   Respiratory: Negative for cough, hemoptysis and sputum production.    Cardiovascular: Negative for chest pain, palpitations and orthopnea.   Gastrointestinal: Positive for abdominal pain, nausea and vomiting.   Genitourinary: Negative for dysuria, frequency and urgency.   Musculoskeletal: Negative for back pain, myalgias and neck pain.   Skin: Negative for itching and rash.   Neurological: Negative for dizziness, tingling and headaches.        Physical Exam  Temp:  [36.4 °C (97.5 °F)-37.2 °C (98.9 °F)] 36.6 °C (97.9 °F)  Pulse:  [44-98] 50  Resp:  [16-22] 20  BP: (118-143)/(70-84) 143/70    Physical Exam   Constitutional: She is oriented to person,  place, and time. No distress.   HENT:   Head: Normocephalic and atraumatic.   Eyes: Pupils are equal, round, and reactive to light. Conjunctivae are normal.   Neck: Normal range of motion. Neck supple.   Cardiovascular: Normal rate and regular rhythm.    Pulmonary/Chest: Effort normal and breath sounds normal.   Abdominal: There is tenderness. There is no rebound and no guarding.   Musculoskeletal: She exhibits no edema or tenderness.   Neurological: She is alert and oriented to person, place, and time.   Skin: Skin is warm and dry. She is not diaphoretic.       Fluids    Intake/Output Summary (Last 24 hours) at 12/23/18 1011  Last data filed at 12/22/18 1600   Gross per 24 hour   Intake             1000 ml   Output              801 ml   Net              199 ml       Laboratory  Recent Labs      12/22/18 0623 12/23/18   0025   WBC  7.6  5.1   RBC  3.07*  2.85*   HEMOGLOBIN  8.7*  8.1*   HEMATOCRIT  27.3*  26.0*   MCV  88.9  91.2   MCH  28.3  28.4   MCHC  31.9*  31.2*   RDW  50.1*  52.7*   PLATELETCT  184  132*   MPV  10.9  11.0     Recent Labs      12/22/18   0623  12/23/18   0025   SODIUM  139  141   POTASSIUM  3.9  3.5*   CHLORIDE  94*  99   CO2  25  27   GLUCOSE  172*  107*   BUN  35*  35*   CREATININE  1.17  0.97   CALCIUM  10.4  9.2     Recent Labs      12/22/18   0623   APTT  25.1   INR  1.28*     Recent Labs      12/22/18   0623   BNPBTYPENAT  102*           Imaging      Assessment/Plan  * Acute pancreatitis- (present on admission)   Assessment & Plan    Unsure about the etiology  Triglyceride is pending  D/w gi for a consult  Npo for now  mrcp showed:1.  Diffuse liver metastases.    2.  Diffuse bony metastasis with pathologic fracture of the L1 vertebral body status post extensive pedicle screw and tal fixation from T10 through L4.    3.  No evidence of intra or extrahepatic biliary ductal obstruction or obstruction of the pancreatic duct. Further there is no evidence of filling defects in the biliary  ductal system.    4.  Marked gastric distention with dilatation of the proximal duodenum, the possibility of mid to distal duodenal obstruction should be considered versus gastroparesis.     Pain in the chest- (present on admission)   Assessment & Plan    None cardiac  -cardiac echo showed;No prior study is available for comparison.   Normal left ventricular chamber size.  Normal left ventricular wall thickness.  Grossly normal left ventricular systolic function.  Left ventricular ejection fraction is visually estimated to be 55%.  Mild tricuspid regurgitation.  Estimated right ventricular systolic pressure is 40 mmHg.    trops are negative     DNR (do not resuscitate) discussion- (present on admission)   Assessment & Plan    discussed advance care planning with the patient for at least 10 minutes, including diagnosis, prognosis, plan of care, risks and benefits of any therapies that could be offered, as well as alternatives including palliation and hospice, as appropriate.  Particularly, I discussed with her about her CODE STATUS, especially with her metastatic malignancy, and poor functional performance.  I asked her if she would want to receive CPR in the case of cardiac arrest, and she replied no.  She also does not want intubation in the event of respiratory arrest or distress.  CODE STATUS updated in the epic system.     Acute kidney injury (HCC)- (present on admission)   Assessment & Plan    -Likely prerenal.  I will start her on IV fluid rehydration with lactated Ringer as above.  Monitor for improvement in renal function.   - avoid nephrotoxins, and continue to renally dose all medications.     Severe protein-calorie malnutrition (Nathan: less than 60% of standard weight) ([facility])- (present on admission)   Assessment & Plan    -In setting of cancer cachexia.  -Clear liquid diet for now given pancreatitis.  Consider nutrition consult for supplements once eating fully.     Pulmonary coccidioidomycosis- (present  on admission)   Assessment & Plan    -I would continue her on her course of fluconazole.  She follows up with outpatient infectious disease clinic.     HTN (hypertension)- (present on admission)   Assessment & Plan    -Monitor blood pressure trend closely, with as needed oral clonidine, and IV hydralazine and Vasotec for significant hypertension.     Breast CA metastatic to the liver, and bone (HCC)- (present on admission)   Assessment & Plan    -Status post chemoradiation.   -She will continue to follow-up with outpatient oncology.  -Evaluation for pancreatic ductal obstructing mass as above.          VTE prophylaxis: heparin

## 2018-12-23 NOTE — PROGRESS NOTES
Monitor summary: 0.16/0.10/0.38   Sinus Rhythm/Sinus Tachycardia 85 - 102   with frequent PVCs and sustained bigeminy

## 2018-12-23 NOTE — CARE PLAN
Problem: Infection  Goal: Will remain free from infection  Outcome: PROGRESSING AS EXPECTED  Patient will remain free from infection during shift.    Problem: Pain Management  Goal: Pain level will decrease to patient's comfort goal  Outcome: PROGRESSING AS EXPECTED  Patient will maintain comfort at rest during shift.    Problem: Skin Integrity  Goal: Risk for impaired skin integrity will decrease  Outcome: PROGRESSING AS EXPECTED  Patient will not experience skin breakdown during shift.

## 2018-12-23 NOTE — DIETARY
Nutrition services: Day 1 of admit.  Geeta Mcleod is a 63 y.o. female with admitting DX of pancreatitis. Per MD notes: known breast and endometrial cancer, metastatic to the liver, and bone, s/p chemoradiation, with last chemotherapy 2 days ago, and radiation 2 weeks ago, along with history of pulmonary coccidiomycosis on oral fluconazole, hypertension, who presented 12/22/2018 with chest pain, back pain, and nausea/vomiting.    Pt seen for poor PO/wt loss per admit screening and low BMI. Pt reports poor appetite/intake for past 1-2 months and UBW ~ 110# (unsure when she was last at this wt). Discussed tips for optimizing nutrition and encouraged small, frequent meals for improved tolerance, pt agreeable to try supplement drinks. Discussed with MD, supplement orders received.       Assessment:  Weight: 43.8 kg (96 lb 9 oz)- per bedscale today.   Body mass index is 18.23 kg/m².  Diet: Clear Liquids.     Evaluation:   1) Pertinent Labs: K+ 3.5, BUN 35, Lipase 673.   2) Pertinent Meds: LR @ 125 ml/hr, Zofran PRN, Compazine PRN, Phenergan PRN , Bowel protocol.   3) Based on current wt, pt with 13% wt loss over past ~ 2 months.     Pt with severe malnutrition in the context of chronic disease related malnutrition related to breast and endometrial cancer with mets to the liver and bone as evidenced by severe wt loss as noted above, decreased energy intake over past two months and pt appears fat/muscle loss to temporal area. Pt appears very thin/frail.       Recommendations/Plan:  1. Advance diet per pt tolerance, recommend low fat diet restrictions for improved tolerance.  2. Will add Boost Breeze as snacks three times daily.   3. Document intake of all PO as % taken in ADL's to provide interdisciplinary communication across all shifts.   4. Monitor weight.  5. Nutrition rep will continue to see patient for ongoing meal and snack preferences as appropriate with diet advancement.     RD following.

## 2018-12-24 NOTE — CARE PLAN
Problem: Pain Management  Goal: Pain level will decrease to patient's comfort goal  Outcome: PROGRESSING AS EXPECTED  Pt offered medication for pain as needed.  Pt repositioned and provided with comfort measures.      Problem: Skin Integrity  Goal: Risk for impaired skin integrity will decrease  Outcome: PROGRESSING AS EXPECTED  Skin assessment completed. Mepilex in place Pillows in use for positioning and to float heels, silicone oxygen tubing in use. Pt on waffle mattress. Hourly rounding in place.

## 2018-12-24 NOTE — RESPIRATORY CARE
Respiratory Rapid Response Note    Symptoms:  Hypoxic     Breath Sounds  Pre/Post Intervention: Pre Intervention Assessment (12/23/18 1930)  RUL Breath Sounds: Clear (12/24/18 1015)  RML Breath Sounds: Diminished (12/24/18 1015)  RLL Breath Sounds: Fine Crackles (12/24/18 1015)  BEAU Breath Sounds: Fine Crackles (12/24/18 1015)  LLL Breath Sounds: Fine Crackles (12/24/18 1015)      O2 (LPM): 10 (12/24/18 1015)  O2 Daily Delivery Respiratory : OxyMask (12/24/18 1015)    Events/Summary/Plan: RT bedside for rapid response.  Patient with increased O2 demands, but WOB stable.  ABG drawn by MD holley to bedside.  Will monitor closely. (12/24/18 1015)    Transferred to ICU:  Not at this time

## 2018-12-24 NOTE — PROGRESS NOTES
Bedside report received. Patient A&O x4 and appears fatigued.  RA. SR on the monitor. Complains of pain 3/10 in abdomen, declines interventions at this time. Abdomen distended with hypoactive bowel sounds.  POC discussed with patient. Patient verbalized understanding. Call light and belongings within reach. Bed locked and in lowest position, alarm and fall precautions in place.

## 2018-12-24 NOTE — CARE PLAN
Problem: Infection  Goal: Will remain free from infection  Outcome: PROGRESSING AS EXPECTED  Patient will not develop infection during shift.    Problem: Pain Management  Goal: Pain level will decrease to patient's comfort goal  Outcome: PROGRESSING AS EXPECTED  Patient will maintain comfort at rest during shift.    Problem: Skin Integrity  Goal: Risk for impaired skin integrity will decrease  Outcome: PROGRESSING AS EXPECTED  Patient's skin integrity will not decline during shift.

## 2018-12-24 NOTE — PROGRESS NOTES
RN notified by CNA that pt O2 sats were 60s-70s with respirations in 30s.  Pt placed on 10L oxy mask sating 91%.  Pt previously on room air.  Pt /62 also noted lower than previous measurements.  New onset abdominal distention also noted. Rapid response called.  Dr. Flores notified and brought to bedside. Orders received to draw STAT ABGs and order chest x-ray and KUB. Daughter at bedside.  Daughter updated on reasoning behind tests.  Dr. Flores discussed results with pt and daughter.  Decision made for pt to become comfort care.

## 2018-12-24 NOTE — CONSULTS
DATE OF SERVICE:  12/23/2018    REFERRING PHYSICIAN:  Braydon Conklin MD    CHIEF COMPLAINT:  Nausea and vomiting.    HISTORY OF PRESENT ILLNESS:  This unfortunate 63-year-old female who breast   cancer diagnosed in 2012 and now has metastasis to both her liver and bone.    She has been receiving chemotherapy as well as radiation therapy, with the   radiation therapy completed 2 weeks ago and her chemotherapy, most recently   given several days ago.  The patient has noted over the past few weeks   problems with progressive nausea when she eats.  On occasion, she will get dry   heaves.  She denies vomiting.  She denies any chest pain, abdominal pain, but   notes that her abdomen is more distended than what it used to be.  She denies   any change in bowel habits or overt GI bleeding.  An MRCP was ordered on the   patient and they noted a dilated stomach and dilated proximal duodenum.    ALLERGIES:  CERTAIN FOODS.    MEDICATIONS:  She was on Diflucan as an outpatient.    PAST MEDICAL AND SURGICAL HISTORY:  1.  Breast cancer with liver and bone metastasis who has been receiving   chemotherapy as well as a completed course of radiation therapy.  2.  Arthritis.  3.  Anemia.  4.  Meniscectomy 2012.  5.  Back surgery.  6.  Hypertension.  7.  Pulmonary coccidioidomycosis.    SOCIAL HISTORY:  She does not smoke, used to drink alcohol, but is not   currently doing that.    FAMILY HISTORY:  Noncontributory.    REVIEW OF SYSTEMS:  GENERAL:  Malaise and fatigue.  RHEUMATOLOGIC:  Significant arthralgias.  PULMONARY:  Mild shortness of breath.  The remainder of her 13-point review of systems is negative except for GI   symptoms.  Please see the HPI.    PHYSICAL EXAMINATION:  VITAL SIGNS:  Blood pressure is 130/82, pulse is 80, respiratory rate is 18,   and temperature is afebrile.  GENERAL:  A sickly frail female in no apparent distress.  SKIN:  No stigmata of chronic liver disease.  HEENT:  Head, normocephalic.  Eyes are  nonicteric.  NECK:  Supple.  LYMPH NODES:  Negative supraclavicular, cervical, or axillary.  HEART:  Regular rate and rhythm.  LUNGS:  Clear to auscultation and percussion.  ABDOMEN:  Mildly distended.  Normoactive bowel sounds without tenderness or   organomegaly.  EXTREMITIES:  Degenerative arthritic changes.  NEUROLOGIC:  Normal  PSYCHIATRIC:  Normal.    IMAGING:  CT scan of the chest shows no evidence of pulmonary embolism, but   bilateral pulmonary nodules, most compatible with metastasis as well as liver   mets and extensive bone mets.  MRCP shows a dilated stomach and proximal   duodenum along with liver mets.  Her gallbladder and bile ducts as well as her   pancreas appeared to be normal.    LABORATORY DATA:  Hematocrit 26, white blood cell count is 5.1, and platelet   count 132,000.  Sodium 139, potassium 3.9, and creatinine 0.98.  Lipase 673.    INR is 1.28, PTT is 25.1.  MRCP, dilated stomach and dilated proximal   duodenum, liver mets.    IMPRESSION AND RECOMMENDATIONS:  1.  Nausea and vomiting.  Overall, this could represent tumor burden in her   upper gastrointestinal tract, causing a component of gastric outlet   obstruction.  It looks like the patient has had progression of her disease   from my review, but this is the first time I have seen her.  I told the   patient we could to an upper gastrointestinal x-ray or evaluate her with an   upper endoscopy, but considering the overall gravity of the situation, the   patient does not want to do those things and we will think about what her next   best option is.  I would cover the patient with a PPI.  2.  Abnormal gastrointestinal x-ray, I think this most likely represents   progression of her cancer.  She is deferring any evaluation at the present   time.  3.  Breast cancer, for which she has metastasis to the bone as well as liver   and apparently lung, and we could be looking at some gastrointestinal   manifestations of metastatic disease.  4.  Liver  metastasis, seems to be progressing.  5.  Abnormal liver function tests on the basis of her liver metastasis.  6.  Elevated lipase, I do not think the patient has alicia pancreatitis.  Her   pancreas appears to be normal.  At times, lipases can become elevated for   other causes and her cancer could be playing a role with some diffuse   gastrointestinal involvement in her bowel or other abnormality.  7.  Anemia, most likely related to her chemotherapy.       ____________________________________     Steve Sherman MD    [id number] / N[facility]    DD:  12/23/2018 17:17:09  DT:  12/23/2018 17:48:40    D#:  [id number]  Job#:  [id number]

## 2018-12-24 NOTE — PROGRESS NOTES
2 RN Skin Check    2 RN skin check performed with TATYANA Sarabia.  Patient's bilateral ears are pink and blanching.  Patient's bilateral elbows are pink and blanching.  Patient's bilateral feet are normal color and blanching.  Patient's sacrum is red and slow to frieda.  Bony prominence noted on sacrum and along spine all the way up back.  Waffle cushion in place and mepilex applied to sacrum.

## 2018-12-24 NOTE — PROGRESS NOTES
GI Progress Note:    Steve Sherman  Date & Time note created:    12/24/2018   8:32 AM       Patient ID:   Name:             Geeta Mcleod   YOB: 1955  Age:                 63 y.o.  female   MRN:               [id number]                                                             CC: nausea and vomiting     Subjective:   Some chest pain last night otherwise unchanged        Past Medical History:   Past Medical History:   Diagnosis Date   • Anemia    • Arthritis     lower back   • Bronchitis     over 30 yrs ago   • Cancer (HCC) 3/2006    endometrial, left breast CA   • Cancer (HCC)      metastatic breast cancer   • History of chemotherapy     started Gemzar 9/24/18   • Hx of radiation therapy    • Hypertension    • Other specified symptom associated with female genital organs 3/2006    endometrial ca       Past Surgical History:  Past Surgical History:   Procedure Laterality Date   • CATH PLACEMENT Right 8/31/2018    Procedure: CATH PLACEMENT- PORT;  Surgeon: Tricia Nino M.D.;  Location: SURGERY SAME DAY [facility];  Service: General   • THORACIC FUSION O-ARM  12/8/2015    Procedure: THORACIC FUSION O-ARM T10-L4 INSTRUMENTED FOR METASTATIC CORD COMPRESSION;  Surgeon: Safia Orr M.D.;  Location: [facility];  Service:    • LUMBAR LAMINECTOMY DISKECTOMY  12/8/2015    Procedure: LUMBAR LAMINECTOMY DISKECTOMY FOR L1 VERTEBRECTOMY AND POSTERIOR DECOMPRESSION T12-L2 ;  Surgeon: Safia Orr M.D.;  Location: [facility];  Service:    • MASTECTOMY  12/27/2012    Performed by Tricia Nino M.D. at [facility]   • NODE BIOPSY SENTINEL  12/27/2012    Performed by Tricia Nino M.D. at [facility]   • CATH REMOVAL  12/27/2012    Performed by Tricia Nino M.D. at [facility]   • CATH PLACEMENT  8/30/2012    Performed by TRICIA NINO at [facility] Medications:    Current Facility-Administered  Medications:   •  omeprazole (PRILOSEC) capsule 20 mg, 20 mg, Oral, DAILY, Steve Sherman M.D., 20 mg at 12/24/18 0609  •  Influenza Vaccine Quad pf injection 0.5 mL, 0.5 mL, Intramuscular, Once, Errol Flores M.D.  •  lactated ringers infusion, , Intravenous, Continuous, Braydon Conklin M.D., Last Rate: 125 mL/hr at 12/24/18 0051  •  fluconazole (DIFLUCAN) tablet 400 mg, 400 mg, Oral, DAILY, Braydon Conklin M.D., 400 mg at 12/24/18 0609  •  Respiratory Care per Protocol, , Nebulization, Continuous RT, Braydon Conklin M.D.  •  cloNIDine (CATAPRES) tablet 0.1 mg, 0.1 mg, Oral, Q6HRS PRN, Braydon Cnoklin M.D.  •  enalaprilat (VASOTEC) injection 1.25 mg, 1.25 mg, Intravenous, Q6HRS PRN, Braydon Conklin M.D.  •  hydrALAZINE (APRESOLINE) injection 10 mg, 10 mg, Intravenous, Q4HRS PRN, Braydon Conklin M.D.  •  ondansetron (ZOFRAN) syringe/vial injection 4 mg, 4 mg, Intravenous, Q4HRS PRN, Braydon Conklin M.D., 4 mg at 12/23/18 1947  •  ondansetron (ZOFRAN ODT) dispertab 4 mg, 4 mg, Oral, Q4HRS PRN, Braydon Conklin M.D., 4 mg at 12/23/18 0619  •  promethazine (PHENERGAN) tablet 12.5-25 mg, 12.5-25 mg, Oral, Q4HRS PRN, Braydon Conklin M.D.  •  promethazine (PHENERGAN) suppository 12.5-25 mg, 12.5-25 mg, Rectal, Q4HRS PRN, Braydon Conklin M.D.  •  prochlorperazine (COMPAZINE) injection 5-10 mg, 5-10 mg, Intravenous, Q4HRS PRN, Braydon Conklin M.D., 10 mg at 12/24/18 0615  •  senna-docusate (PERICOLACE or SENOKOT S) 8.6-50 MG per tablet 2 Tab, 2 Tab, Oral, BID, 2 Tab at 12/23/18 1632 **AND** polyethylene glycol/lytes (MIRALAX) PACKET 1 Packet, 1 Packet, Oral, QDAY PRN **AND** magnesium hydroxide (MILK OF MAGNESIA) suspension 30 mL, 30 mL, Oral, QDAY PRN **AND** bisacodyl (DULCOLAX) suppository 10 mg, 10 mg, Rectal, QDAY PRN, Braydon Conklin M.D.  •  acetaminophen (TYLENOL) tablet 650 mg, 650 mg, Oral, Q6HRS PRN, Braydon Conklin M.D.  •  heparin injection 5,000 Units, 5,000 Units, Subcutaneous, Q8HRS, Braydon Conklin  M.D., 5,000 Units at 12/24/18 0609  •  Notify provider if pain remains uncontrolled, , , CONTINUOUS **AND** Use the numeric rating scale (NRS-11) on regular floors and Critical-Care Pain Observation Tool (CPOT) on ICUs/Trauma to assess pain, , , CONTINUOUS **AND** Pulse Ox (Oximetry), , , CONTINUOUS **AND** Pharmacy Consult Request ...Pain Management Review, , Other, PRN **AND** If patient difficult to arouse and/or has respiratory depression, stop any opiates that are currently infusing and call a Rapid Response., , , CONTINUOUS **AND** oxyCODONE immediate-release (ROXICODONE) tablet 2.5 mg, 2.5 mg, Oral, Q3HRS PRN, 2.5 mg at 12/23/18 1947 **AND** oxyCODONE immediate-release (ROXICODONE) tablet 5 mg, 5 mg, Oral, Q3HRS PRN, 5 mg at 12/24/18 0615 **AND** morphine (pf) 10 mg/mL injection 2 mg, 2 mg, Intravenous, Q3HRS PRN, Braydon Conklin M.D., 2 mg at 12/24/18 0357    Medication Allergy:  Allergies   Allergen Reactions   • Other Food Vomiting     Sea scallops  RXN=ongoing     ROS: 13 point review unchanged except some chest pain last night which has resolved    Physical Exam:  Vitals/ General Appearance:   Weight/BMI: Body mass index is 18.36 kg/m².  Blood pressure 138/81, pulse 79, temperature 36.6 °C (97.8 °F), temperature source Temporal, resp. rate 14, weight 44.1 kg (97 lb 3.6 oz), SpO2 94 %, not currently breastfeeding.  Vitals:    12/23/18 1757 12/23/18 1910 12/24/18 0010 12/24/18 0355   BP: 143/91 144/88 140/71 138/81   Pulse: 91 64 61 79   Resp: 16 16 13 14   Temp: 36.6 °C (97.9 °F) 37.3 °C (99.2 °F) 36.4 °C (97.6 °F) 36.6 °C (97.8 °F)   TempSrc: Temporal Temporal Temporal Temporal   SpO2: 96% 92% 93% 94%   Weight:  44.1 kg (97 lb 3.6 oz)         Heart: RRR  Lungs: Clear  Abdomen: + BS, mildly distended, non tender      Lab Data Review:  Recent Labs      12/22/18   0623  12/23/18   0025   WBC  7.6  5.1   RBC  3.07*  2.85*   HEMOGLOBIN  8.7*  8.1*   HEMATOCRIT  27.3*  26.0*   MCV  88.9  91.2   MCH  28.3  28.4    MCHC  31.9*  31.2*   RDW  50.1*  52.7*   PLATELETCT  184  132*   MPV  10.9  11.0     Recent Labs      12/22/18 0623 12/23/18   0025   SODIUM  139  141   POTASSIUM  3.9  3.5*   CHLORIDE  94*  99   CO2  25  27   GLUCOSE  172*  107*   BUN  35*  35*   CREATININE  1.17  0.97   CALCIUM  10.4  9.2     Recent Labs      12/22/18 0623   APTT  25.1   INR  1.28*     Recent Labs      12/22/18 0623   BNPBTYPENAT  102*     Recent Labs      12/22/18 0623  12/23/18   0025   TROPONINI  <0.01  0.01   BNPBTYPENAT  102*   --      Recent Labs      12/22/18 0623 12/23/18   0025   SODIUM  139  141   POTASSIUM  3.9  3.5*   CHLORIDE  94*  99   CO2  25  27   GLUCOSE  172*  107*   BUN  35*  35*     Recent Labs      12/22/18 0623  12/23/18   0025   SODIUM  139  141   POTASSIUM  3.9  3.5*   CHLORIDE  94*  99   CO2  25  27   BUN  35*  35*   CREATININE  1.17  0.97   CALCIUM  10.4  9.2     Recent Labs      12/22/18 0623   APTT  25.1   INR  1.28*          Imaging/Procedures Review:        Assessment and plan:  1. N+V- unchanged, findings on MRCP with dilated stomach and prox duodenum could be related to progression of her cancer vs other process. Pt not interested in EGD. Has been placed on PPI and will continue current Rx  2. Breast cancer with liver, lung and bone mets- could be involving UGI tract as well. Grim prognosis  3. Abn LFT's- due to liver mets  4. Elevated lipase- pancreas normal on MRCP, doubt pancreatitis and elevated lipase may be due to other process besides pancreatitis  5. Anemia- in part due to her chemo

## 2018-12-24 NOTE — PROGRESS NOTES
Jarrod from Lab called with critical result of PO2 of 28.3 at 1053. Critical lab result read back to Jarrod.   Dr. Flores notified of critical lab result at 1056.  Critical lab result read back by Dr. Flores.

## 2018-12-24 NOTE — PROGRESS NOTES
Monitor summary: 0.14/0.06/0.36   Sinus Rhythm/Sinus Tachycardia 96 - 103   with frequent PVCs, and frequent bigeminy

## 2018-12-24 NOTE — PROGRESS NOTES
"Patient reported chest pain feeling like \"pressure\" with scale 8/10.  Charge nurse notified and stat EKG ordered.  Hospitalist notified.  Dr. Garcia updated and ordered magnesium and phosphorous with morning labs.  "

## 2018-12-24 NOTE — PROGRESS NOTES
[facility] Medicine Daily Progress Note    Date of Service  12/24/2018    Chief Complaint  63 y.o. female admitted 12/22/2018 with abd pain and vomiting    Hospital Course    63 y.o. Female with known breast and endometrial cancer, metastatic to the liver, and bone, s/p chemoradiation, with last chemotherapy 2 days ago, and radiation 2 weeks ago, along with history of pulmonary coccidiomycosis on oral fluconazole, hypertension, who presented 12/22/2018 with chest pain, back pain, and nausea/vomiting.     Patient stated that since last night, she started to have pressure-like pain on the left side of her chest, along with mid back pain, which she rated as 8 out of 10 in severity, and not associated with movement, breathing or coughing.  She also started to have nausea, and multiple episodes of nonbloody bilious vomiting although she denied any abdominal pain.  She did not have any diarrhea or bowel movement changes, or dysuria    Interval Problem Update  Pt is placed on comfort care    Consultants/Specialty  gi    Code Status  dnr    Disposition  pending    Review of Systems  Review of Systems   Constitutional: Positive for malaise/fatigue. Negative for chills and diaphoresis.   HENT: Negative for congestion, ear discharge and nosebleeds.    Eyes: Negative for photophobia, pain and discharge.   Respiratory: Negative for hemoptysis, sputum production and shortness of breath.    Cardiovascular: Negative for orthopnea, claudication and leg swelling.   Gastrointestinal: Positive for abdominal pain, nausea and vomiting.   Genitourinary: Negative for frequency, hematuria and urgency.   Musculoskeletal: Negative for myalgias and neck pain.   Skin: Negative for itching and rash.   Neurological: Negative for tingling, tremors and sensory change.        Physical Exam  Temp:  [36.4 °C (97.6 °F)-37.3 °C (99.2 °F)] 36.7 °C (98 °F)  Pulse:  [] 96  Resp:  [13-32] 26  BP: (104-144)/(62-91) 116/69    Physical Exam    Constitutional: She is oriented to person, place, and time. No distress.   HENT:   Right Ear: External ear normal.   Left Ear: External ear normal.   Eyes: Right eye exhibits no discharge. Left eye exhibits no discharge.   Neck: No JVD present.   Cardiovascular: Normal heart sounds.    Pulmonary/Chest: No stridor. No respiratory distress. She has no wheezes. She has no rales.   Abdominal: She exhibits distension. There is tenderness. There is no rebound.   Musculoskeletal: She exhibits no edema or tenderness.   Neurological: She is alert and oriented to person, place, and time.   Skin: Skin is warm and dry. She is not diaphoretic.       Fluids  No intake or output data in the 24 hours ending 12/24/18 1113    Laboratory  Recent Labs      12/22/18 0623 12/23/18   0025   WBC  7.6  5.1   RBC  3.07*  2.85*   HEMOGLOBIN  8.7*  8.1*   HEMATOCRIT  27.3*  26.0*   MCV  88.9  91.2   MCH  28.3  28.4   MCHC  31.9*  31.2*   RDW  50.1*  52.7*   PLATELETCT  184  132*   MPV  10.9  11.0     Recent Labs      12/22/18 0623  12/23/18   0025   SODIUM  139  141   POTASSIUM  3.9  3.5*   CHLORIDE  94*  99   CO2  25  27   GLUCOSE  172*  107*   BUN  35*  35*   CREATININE  1.17  0.97   CALCIUM  10.4  9.2     Recent Labs      12/22/18 0623   APTT  25.1   INR  1.28*     Recent Labs      12/22/18 0623   BNPBTYPENAT  102*           Imaging      Assessment/Plan  * Acute pancreatitis- (present on admission)   Assessment & Plan    D/w the pt and her daugther and explained to  Them about gi assessment.  They also know that she has advanced cancer with what appears to be outlet obstruction  Pt is dnr/dni and they are both agreeable to hospice and comfort care.  No more medical intervention  Comfort care order set  Hospice is consulted.  mrcp showed:1.  Diffuse liver metastases.    2.  Diffuse bony metastasis with pathologic fracture of the L1 vertebral body status post extensive pedicle screw and tal fixation from T10 through L4.    3.  No  evidence of intra or extrahepatic biliary ductal obstruction or obstruction of the pancreatic duct. Further there is no evidence of filling defects in the biliary ductal system.    4.  Marked gastric distention with dilatation of the proximal duodenum, the possibility of mid to distal duodenal obstruction should be considered versus gastroparesis.     Pain in the chest- (present on admission)   Assessment & Plan    Comfort care     DNR (do not resuscitate) discussion- (present on admission)   Assessment & Plan    Comfort care only     Acute kidney injury (HCC)- (present on admission)   Assessment & Plan    -Likely prerenal.  I will start her on IV fluid rehydration with lactated Ringer as above.  Monitor for improvement in renal function.   - avoid nephrotoxins, and continue to renally dose all medications.     Severe protein-calorie malnutrition (Nathan: less than 60% of standard weight) (HCC)- (present on admission)   Assessment & Plan    -In setting of cancer cachexia.  -Clear liquid diet for now given pancreatitis.  Consider nutrition consult for supplements once eating fully.     Pulmonary coccidioidomycosis- (present on admission)   Assessment & Plan    -I would continue her on her course of fluconazole.  She follows up with outpatient infectious disease clinic.     HTN (hypertension)- (present on admission)   Assessment & Plan    -Monitor blood pressure trend closely, with as needed oral clonidine, and IV hydralazine and Vasotec for significant hypertension.     Breast CA metastatic to the liver, and bone (HCC)- (present on admission)   Assessment & Plan    -Status post chemoradiation.   Comfort care only          VTE prophylaxis: heparin

## 2018-12-25 PROBLEM — Z51.5 END OF LIFE CARE: Status: ACTIVE | Noted: 2018-01-01

## 2018-12-25 NOTE — PROGRESS NOTES
Family in room.  Daughter and cousin requested this RN call hospitalist to request ativan be given more often.  Explained to family that this will reduce patient's respirations and may cause patient to die faster than without this medical regimen.  Family understands and consents.  Dr. Garcia prescribed ativan IV every hour as needed for anxiety, agitation.

## 2018-12-25 NOTE — PALLIATIVE CARE
Palliative Care follow-up  Received consult, pt placed onto comfort care measures. Updated MD that consult will be cancelled, if pt stable hospice consult can be requested.   Spoke with BS RN. Pt and family requesting to be left in peace together.    Thank you for allowing Palliative Care to participate in this patient's care. Please feel free to call [phone] with any questions or concerns.

## 2018-12-25 NOTE — PROGRESS NOTES
2 RN Skin Check not performed.  Patient is comfort care and patient's family requests that patient not be disturbed and be allowed to remain comfortable.

## 2018-12-25 NOTE — CARE PLAN
Problem: Knowledge Deficit  Goal: Knowledge of the prescribed therapeutic regimen will improve  Outcome: PROGRESSING AS EXPECTED  Patient and family will be able to verbalize treatment during comfort care and how medications may affect patient, lifespan, and comfort.    Problem: Pain Management  Goal: Pain level will decrease to patient's comfort goal  Outcome: PROGRESSING AS EXPECTED  Patient will maintain comfort at rest during shift.    Problem: Respiratory:  Goal: Respiratory status will improve  Outcome: PROGRESSING AS EXPECTED  Patient's work of breathing and oxygen demand will be lowered during shift.

## 2018-12-25 NOTE — PROGRESS NOTES
Pt respirations are increasing.  Pt denies pain at this time.  Dr. Flores paged to oral solution of ativan and morphine for anxiety and air hunger to administer as needed. Orders received.

## 2018-12-25 NOTE — PROGRESS NOTES
Bedside report received. Patient A&O x0, pt very lethargic. Pt is medical.  Pt on 15L oxy mask. Respirations at 20, pt medicated with ativan per MAR.  Daughter at bedside. POC discussed with daughter. Daughter verbalized understanding. Call light and belongings within reach. Bed locked and in lowest position, alarm and fall precautions in place.

## 2018-12-25 NOTE — CARE PLAN
Problem: Knowledge Deficit  Goal: Knowledge of disease process/condition, treatment plan, diagnostic tests, and medications will improve  Outcome: PROGRESSING AS EXPECTED  Pt family educated on comfort care expectations and family needs addressed.      Problem: Pain Management  Goal: Pain level will decrease to patient's comfort goal  Outcome: PROGRESSING AS EXPECTED  Pt is comfort care. Pt administered medications for pain and anxiety as needed.

## 2018-12-25 NOTE — PROGRESS NOTES
Pt now converted to comfort care only and palliative medicine now involved. PE is unchanged and will sign off, call if can help

## 2018-12-25 NOTE — PROGRESS NOTES
2 RN skin check not performed per pt family request.  Pt is comfort care. Family would like pt to remain comfortable without being disturbed.

## 2018-12-26 NOTE — DISCHARGE SUMMARY
Death Summary    Cause of Death  Intractable emesis due to gastric outlet obstruction due to tumor metastasis to duodenum, liver, bone due to metastatic breast cancer.    Comorbid Conditions at the Time of Death  Principal Problem:    Acute pancreatitis POA: Yes  Active Problems:    Pain in the chest POA: Yes    End of life care POA: Yes    Breast CA metastatic to the liver, and bone (HCC) POA: Yes    HTN (hypertension) POA: Yes    Pulmonary coccidioidomycosis POA: Yes    Severe protein-calorie malnutrition (Nathan: less than 60% of standard weight) (HCC) POA: Yes    Acute kidney injury (HCC) POA: Yes  Resolved Problems:    * No resolved hospital problems. *      History of Presenting Illness and Hospital Course  This is a 63 y.o. female admitted 2018 with intractable nausea/emesis/pain in epigastrium. She had known metastatic breast cancer to liver, bone, s/p chemoradiation therapy, new metastatic pulmonary nodules bilaterally. MRCP revealed dilated stomach and dilated proximal duodenum. She had declined more aggressive treatments such as EGD/biopsy.  The gastric outlet obstruction was likely metastatic tumor to her her duodenum.  She had decided on comfort care on  and  comfortably on  with family at her side.    Death Date: 18   Death Time:          Pronounced By (RN1): Mary Jane Salgado  Pronounced By (RN2): Anny Castillo

## 2018-12-26 NOTE — PROGRESS NOTES
[facility] Medicine Daily Progress Note    Date of Service  12/25/2018    Chief Complaint  63 y.o. female admitted 12/22/2018 with abd pain and vomiting.    Hospital Course    63 y.o. Female with known breast and endometrial cancer, metastatic to the liver, and bone, s/p chemoradiation, with last chemotherapy 2 days ago, and radiation 2 weeks ago, along with history of pulmonary coccidiomycosis on oral fluconazole, hypertension, who presented 12/22/2018 with chest pain, back pain, and nausea/vomiting.     Patient stated that since last night, she started to have pressure-like pain on the left side of her chest, along with mid back pain, which she rated as 8 out of 10 in severity, and not associated with movement, breathing or coughing.  She also started to have nausea, and multiple episodes of nonbloody bilious vomiting although she denied any abdominal pain.  She did not have any diarrhea or bowel movement changes, or dysuria    Interval Problem Update  Pt is placed on comfort care since 12/24.  12/25:  Patient continues to have agitation/discomfort.  Morphine made her more agitated, but dilaudid helped.  dc'd morphine po and IV, dc'd oxycodone.  Ordered dilaudid 0.5 to 1 mg IV q 2 hours prn as well as increased dose of ativan suspension.  Patient appears more comfortable after changes made, no current Sage in place, but not eating or drinking for several days per bedside RN and son in law at bedside.      Consultants/Specialty  gi    Code Status  Comfort care    Disposition  hospice    Review of Systems  Review of Systems   Constitutional: Positive for malaise/fatigue. Negative for chills and diaphoresis.   HENT: Negative for congestion, ear discharge and nosebleeds.    Eyes: Negative for photophobia, pain and discharge.   Respiratory: Negative for hemoptysis, sputum production and shortness of breath.    Cardiovascular: Negative for orthopnea, claudication and leg swelling.   Gastrointestinal: Positive for  abdominal pain, nausea and vomiting.   Genitourinary: Negative for frequency, hematuria and urgency.   Musculoskeletal: Negative for myalgias and neck pain.   Skin: Negative for itching and rash.   Neurological: Negative for tingling, tremors and sensory change.        Physical Exam  Temp:  [36.3 °C (97.3 °F)-38.1 °C (100.6 °F)] 36.3 °C (97.3 °F)  Pulse:  [59-65] 65  Resp:  [20-38] 22  BP: (57-67)/(32-42) 57/32    Physical Exam   Constitutional: She is oriented to person, place, and time. No distress.   HENT:   Right Ear: External ear normal.   Left Ear: External ear normal.   Eyes: Right eye exhibits no discharge. Left eye exhibits no discharge.   Neck: No JVD present.   Cardiovascular: Normal heart sounds.    Pulmonary/Chest: No stridor. No respiratory distress. She has no wheezes. She has no rales.   Abdominal: She exhibits distension. There is tenderness. There is no rebound.   Musculoskeletal: She exhibits no edema or tenderness.   Neurological: She is alert and oriented to person, place, and time.   Skin: Skin is warm and dry. She is not diaphoretic.       Fluids  No intake or output data in the 24 hours ending 12/25/18 1646    Laboratory  Recent Labs      12/23/18   0025   WBC  5.1   RBC  2.85*   HEMOGLOBIN  8.1*   HEMATOCRIT  26.0*   MCV  91.2   MCH  28.4   MCHC  31.2*   RDW  52.7*   PLATELETCT  132*   MPV  11.0     Recent Labs      12/23/18   0025   SODIUM  141   POTASSIUM  3.5*   CHLORIDE  99   CO2  27   GLUCOSE  107*   BUN  35*   CREATININE  0.97   CALCIUM  9.2                   Imaging      Assessment/Plan  * Acute pancreatitis- (present on admission)   Assessment & Plan    D/w the pt and her daugther and explained to  Them about gi assessment.  They also know that she has advanced cancer with what appears to be outlet obstruction  Pt is dnr/dni and they are both agreeable to hospice and comfort care.  No more medical intervention  Comfort care order set  Hospice is consulted.  mrcp showed:1.  Diffuse  liver metastases.    2.  Diffuse bony metastasis with pathologic fracture of the L1 vertebral body status post extensive pedicle screw and tal fixation from T10 through L4.    3.  No evidence of intra or extrahepatic biliary ductal obstruction or obstruction of the pancreatic duct. Further there is no evidence of filling defects in the biliary ductal system.    4.  Marked gastric distention with dilatation of the proximal duodenum, the possibility of mid to distal duodenal obstruction should be considered versus gastroparesis.  12/25:  GI signed off, now comfort care.  Not eating or drinking x several days.  Pain controlled with IV dilaudid.     Pain in the chest- (present on admission)   Assessment & Plan    Comfort care     End of life care- (present on admission)   Assessment & Plan    Comfort care only since 12/24.  12/25:  Increase in pain medications:  IV dilaudid, ativan liquid po increased with better control of pain/agitation.  Unable to tolerate morphine po or IV per bedside RN, does not help with pain.     Acute kidney injury (HCC)- (present on admission)   Assessment & Plan    -Likely prerenal.  I will start her on IV fluid rehydration with lactated Ringer as above.  Monitor for improvement in renal function.   - avoid nephrotoxins, and continue to renally dose all medications.  12/25:  Now on CC, no further monitoring.     Severe protein-calorie malnutrition (Nathan: less than 60% of standard weight) (HCC)- (present on admission)   Assessment & Plan    -In setting of cancer cachexia.  -Clear liquid diet for now given pancreatitis.   Comfort care.       Pulmonary coccidioidomycosis- (present on admission)   Assessment & Plan    Comfort care only.     HTN (hypertension)- (present on admission)   Assessment & Plan    -now comfort care.     Breast CA metastatic to the liver, and bone (HCC)- (present on admission)   Assessment & Plan    -Status post chemoradiation.   Comfort care only          VTE  prophylaxis: SCDs

## 2018-12-26 NOTE — PROGRESS NOTES
This RN notified that pt passed at 1806.  RN to bedside to assess pt.  2 RNs pronounced pt per order.  Family at bedside.   and [facility]. Pt is not a 's case. RN spoke with Abdullahi from [facility].  Unit pt information and unit phone number provided to Abdullahi. Per Abdullahi this case will be a [facility] case and Abdullahi stated that they may not get back to this RN tonight.  Case ID provided to this RN and ID number placed in pt chart.  Will await follow up from [facility].

## (undated) DEVICE — KIT  I.V. START (100EA/CA)

## (undated) DEVICE — GLOVE BIOGEL SZ 6.5 SURGICAL PF LTX (50PR/BX 4BX/CA)

## (undated) DEVICE — SET LEADWIRE 5 LEAD BEDSIDE DISPOSABLE ECG (1SET OF 5/EA)

## (undated) DEVICE — MASK ANESTHESIA ADULT  - (100/CA)

## (undated) DEVICE — KIT ANESTHESIA W/CIRCUIT & 3/LT BAG W/FILTER (20EA/CA)

## (undated) DEVICE — SHEET TRANSVERSE LAP - (12EA/CA)

## (undated) DEVICE — CHLORAPREP 26 ML APPLICATOR - ORANGE TINT(25/CA)

## (undated) DEVICE — SENSOR SPO2 NEO LNCS ADHESIVE (20/BX) SEE USER NOTES

## (undated) DEVICE — NEPTUNE 4 PORT MANIFOLD - (20/PK)

## (undated) DEVICE — CLOSURE WOUND 1/4 X 4 (STERI - STRIP) (50/BX 4BX/CA)

## (undated) DEVICE — DRESSING TRANSPARENT FILM TEGADERM 4 X 4.75" (50EA/BX)"

## (undated) DEVICE — DRESSING TRANSPARENT FILM TEGADERM 2.375 X 2.75"  (100EA/BX)"

## (undated) DEVICE — WATER IRRIGATION STERILE 1000ML (12EA/CA)

## (undated) DEVICE — CANISTER SUCTION RIGID RED 1500CC (40EA/CA)

## (undated) DEVICE — TUBING CLEARLINK DUO-VENT - C-FLO (48EA/CA)

## (undated) DEVICE — SOD. CHL. INJ. 0.9% 250 ML - (36/CA 50CA/PF)

## (undated) DEVICE — TRAY SKIN SCRUB PVP WET (20EA/CA) PART #DYND70356 DISCONTINUED

## (undated) DEVICE — SUCTION INSTRUMENT YANKAUER BULBOUS TIP W/O VENT (50EA/CA)

## (undated) DEVICE — ELECTRODE 850 FOAM ADHESIVE - HYDROGEL RADIOTRNSPRNT (50/PK)

## (undated) DEVICE — CATHETER IV 20 GA X 1-1/4 ---SURG.& SDS ONLY--- (50EA/BX)

## (undated) DEVICE — SUTURE 3-0 VICRYL PLUS SH - 8X 18 INCH (12/BX)

## (undated) DEVICE — CANISTER SUCTION 3000ML MECHANICAL FILTER AUTO SHUTOFF MEDI-VAC NONSTERILE LF DISP  (40EA/CA)

## (undated) DEVICE — BAG DECANTER (50EA/CS)

## (undated) DEVICE — SUTURE 2-0 PROL CT-2 (36PK/BX)

## (undated) DEVICE — SUTURE 2-0 ETHILON FS - (36/BX) 18 INCH

## (undated) DEVICE — DRAPE C-ARM LARGE 41IN X 74 IN - (10/BX 2BX/CA)

## (undated) DEVICE — SPONGE GAUZESTER. 2X2 4-PL - (2/PK 50PK/BX 30BX/CS)

## (undated) DEVICE — PROTECTOR ULNA NERVE - (36PR/CA)

## (undated) DEVICE — BLADE SURGICAL #11 - (50/BX)

## (undated) DEVICE — ELECTRODE DUAL RETURN W/ CORD - (50/PK)

## (undated) DEVICE — SODIUM CHL IRRIGATION 0.9% 1000ML (12EA/CA)

## (undated) DEVICE — TUBE CONNECTING SUCTION - CLEAR PLASTIC STERILE 72 IN (50EA/CA)

## (undated) DEVICE — SUTURE GENERAL

## (undated) DEVICE — PACK MINOR BASIN - (2EA/CA)

## (undated) DEVICE — HEAD HOLDER JUNIOR/ADULT

## (undated) DEVICE — SYRINGE SAFETY 5 ML 18 GA X 1-1/2 BLUNT LL (100/BX 4BX/CA)

## (undated) DEVICE — SUTURE 4-0 VICRYL PLUS FS-2 - 27 INCH (36/BX)

## (undated) DEVICE — MASK AIRWAY SIZE 3 UNIQUE SILICON (10/BX)

## (undated) DEVICE — LACTATED RINGERS INJ 1000 ML - (14EA/CA 60CA/PF)

## (undated) DEVICE — GOWN WARMING STANDARD FLEX - (30/CA)